# Patient Record
Sex: FEMALE | Race: WHITE | Employment: OTHER | URBAN - METROPOLITAN AREA
[De-identification: names, ages, dates, MRNs, and addresses within clinical notes are randomized per-mention and may not be internally consistent; named-entity substitution may affect disease eponyms.]

---

## 2017-12-26 ENCOUNTER — HOSPITAL ENCOUNTER (OUTPATIENT)
Dept: MAMMOGRAPHY | Age: 47
Discharge: HOME | End: 2017-12-26
Attending: FAMILY MEDICINE
Payer: MEDICARE

## 2017-12-26 DIAGNOSIS — Z12.31 VISIT FOR SCREENING MAMMOGRAM: ICD-10-CM

## 2017-12-26 PROCEDURE — 77067 SCR MAMMO BI INCL CAD: CPT

## 2018-11-28 ENCOUNTER — HOSPITAL ENCOUNTER (OUTPATIENT)
Dept: GENERAL RADIOLOGY | Age: 48
Discharge: HOME | End: 2018-11-28
Payer: MEDICARE

## 2018-11-28 DIAGNOSIS — M54.2 CERVICAL SPINE PAIN: ICD-10-CM

## 2018-11-28 PROCEDURE — 72050 X-RAY EXAM NECK SPINE 4/5VWS: CPT

## 2019-01-23 ENCOUNTER — HOSPITAL ENCOUNTER (OUTPATIENT)
Dept: MAMMOGRAPHY | Age: 49
Discharge: HOME | End: 2019-01-23
Attending: FAMILY MEDICINE
Payer: MEDICARE

## 2019-01-23 DIAGNOSIS — Z12.31 VISIT FOR SCREENING MAMMOGRAM: ICD-10-CM

## 2019-01-23 PROCEDURE — 77067 SCR MAMMO BI INCL CAD: CPT

## 2019-10-01 ENCOUNTER — HOSPITAL ENCOUNTER (OUTPATIENT)
Dept: CT IMAGING | Age: 49
Discharge: HOME | End: 2019-10-01
Attending: NURSE PRACTITIONER
Payer: MEDICARE

## 2019-10-01 DIAGNOSIS — R10.9 BILATERAL FLANK PAIN: ICD-10-CM

## 2019-10-01 DIAGNOSIS — Z87.440 HISTORY OF RECURRENT UTIS: ICD-10-CM

## 2019-10-01 DIAGNOSIS — Z87.442 HISTORY OF KIDNEY STONES: ICD-10-CM

## 2019-10-01 PROCEDURE — 74176 CT ABD & PELVIS W/O CONTRAST: CPT

## 2019-10-02 NOTE — PROGRESS NOTES
I called and spoke with Ms. Darrel Haines about her CT results. She will be contacting [facility] about follow-up on the right ovarian mass.

## 2020-01-01 ENCOUNTER — APPOINTMENT (OUTPATIENT)
Dept: GENERAL RADIOLOGY | Age: 50
DRG: 207 | End: 2020-01-01
Attending: INTERNAL MEDICINE
Payer: MEDICARE

## 2020-01-01 ENCOUNTER — HOSPITAL ENCOUNTER (OUTPATIENT)
Dept: MAMMOGRAPHY | Age: 50
Discharge: HOME | End: 2020-02-03
Attending: FAMILY MEDICINE

## 2020-01-01 ENCOUNTER — HOSPITAL ENCOUNTER (INPATIENT)
Age: 50
LOS: 11 days | DRG: 207 | End: 2020-11-10
Attending: EMERGENCY MEDICINE | Admitting: FAMILY MEDICINE
Payer: MEDICARE

## 2020-01-01 ENCOUNTER — APPOINTMENT (OUTPATIENT)
Dept: GENERAL RADIOLOGY | Age: 50
DRG: 207 | End: 2020-01-01
Attending: EMERGENCY MEDICINE
Payer: MEDICARE

## 2020-01-01 ENCOUNTER — HOSPITAL ENCOUNTER (OUTPATIENT)
Dept: GENERAL RADIOLOGY | Age: 50
Discharge: HOME | End: 2020-01-02
Payer: MEDICARE

## 2020-01-01 VITALS
OXYGEN SATURATION: 89 % | BODY MASS INDEX: 46.13 KG/M2 | DIASTOLIC BLOOD PRESSURE: 33 MMHG | RESPIRATION RATE: 35 BRPM | SYSTOLIC BLOOD PRESSURE: 60 MMHG | TEMPERATURE: 98.2 F | HEIGHT: 62 IN | WEIGHT: 250.66 LBS

## 2020-01-01 DIAGNOSIS — I10 ESSENTIAL HYPERTENSION: ICD-10-CM

## 2020-01-01 DIAGNOSIS — J12.82 PNEUMONIA DUE TO COVID-19 VIRUS: ICD-10-CM

## 2020-01-01 DIAGNOSIS — Z12.31 OTHER SCREENING MAMMOGRAM: ICD-10-CM

## 2020-01-01 DIAGNOSIS — U07.1 COVID-19: ICD-10-CM

## 2020-01-01 DIAGNOSIS — M13.0 POLYARTHRITIS: ICD-10-CM

## 2020-01-01 DIAGNOSIS — J96.01 ACUTE HYPOXEMIC RESPIRATORY FAILURE DUE TO COVID-19 (HCC): ICD-10-CM

## 2020-01-01 DIAGNOSIS — J80 ACUTE RESPIRATORY DISTRESS SYNDROME (ARDS) DUE TO COVID-19 VIRUS (HCC): ICD-10-CM

## 2020-01-01 DIAGNOSIS — U07.1 ACUTE RESPIRATORY DISTRESS SYNDROME (ARDS) DUE TO COVID-19 VIRUS (HCC): ICD-10-CM

## 2020-01-01 DIAGNOSIS — G47.33 OSA (OBSTRUCTIVE SLEEP APNEA): Chronic | ICD-10-CM

## 2020-01-01 DIAGNOSIS — R09.02 HYPOXIA: ICD-10-CM

## 2020-01-01 DIAGNOSIS — M25.69 BACK STIFFNESS: ICD-10-CM

## 2020-01-01 DIAGNOSIS — E66.01 OBESITY, MORBID (MORE THAN 100 LBS OVER IDEAL WEIGHT OR BMI > 40) (HCC): ICD-10-CM

## 2020-01-01 DIAGNOSIS — R00.0 TACHYCARDIA: ICD-10-CM

## 2020-01-01 DIAGNOSIS — U07.1 PNEUMONIA DUE TO COVID-19 VIRUS: ICD-10-CM

## 2020-01-01 DIAGNOSIS — E66.01 MORBID OBESITY (HCC): ICD-10-CM

## 2020-01-01 DIAGNOSIS — E87.5 HYPERKALEMIA: ICD-10-CM

## 2020-01-01 DIAGNOSIS — U07.1 COVID-19 VIRUS INFECTION: Primary | ICD-10-CM

## 2020-01-01 DIAGNOSIS — U07.1 ACUTE HYPOXEMIC RESPIRATORY FAILURE DUE TO COVID-19 (HCC): ICD-10-CM

## 2020-01-01 LAB
ABO + RH BLD: NORMAL
ALBUMIN SERPL-MCNC: 2.7 G/DL (ref 3.5–5)
ALBUMIN/GLOB SERPL: 0.6 {RATIO} (ref 1.2–3.5)
ALP SERPL-CCNC: 113 U/L (ref 50–136)
ALT SERPL-CCNC: 25 U/L (ref 12–65)
ANION GAP SERPL CALC-SCNC: 0 MMOL/L (ref 7–16)
ANION GAP SERPL CALC-SCNC: 1 MMOL/L (ref 7–16)
ANION GAP SERPL CALC-SCNC: 2 MMOL/L (ref 7–16)
ANION GAP SERPL CALC-SCNC: 2 MMOL/L (ref 7–16)
ANION GAP SERPL CALC-SCNC: 4 MMOL/L (ref 7–16)
ANION GAP SERPL CALC-SCNC: 5 MMOL/L (ref 7–16)
ANION GAP SERPL CALC-SCNC: 5 MMOL/L (ref 7–16)
ANION GAP SERPL CALC-SCNC: 7 MMOL/L (ref 7–16)
ANION GAP SERPL CALC-SCNC: 7 MMOL/L (ref 7–16)
ANION GAP SERPL CALC-SCNC: ABNORMAL MMOL/L (ref 7–16)
APPEARANCE UR: ABNORMAL
APPEARANCE UR: CLEAR
APTT PPP: 33.4 SEC (ref 24.7–39.8)
ARTERIAL PATENCY WRIST A: ABNORMAL
ARTERIAL PATENCY WRIST A: YES
AST SERPL-CCNC: 52 U/L (ref 15–37)
ATRIAL RATE: 126 BPM
BACTERIA SPEC CULT: NORMAL
BACTERIA URNS QL MICRO: ABNORMAL /HPF
BASE DEFICIT BLD-SCNC: 2 MMOL/L
BASE EXCESS BLD CALC-SCNC: 0 MMOL/L
BASE EXCESS BLD CALC-SCNC: 0 MMOL/L
BASE EXCESS BLD CALC-SCNC: 1 MMOL/L
BASE EXCESS BLD CALC-SCNC: 13 MMOL/L
BASE EXCESS BLD CALC-SCNC: 14 MMOL/L
BASE EXCESS BLD CALC-SCNC: 17 MMOL/L
BASE EXCESS BLD CALC-SCNC: 2 MMOL/L
BASE EXCESS BLD CALC-SCNC: 3 MMOL/L
BASE EXCESS BLD CALC-SCNC: 4 MMOL/L
BASE EXCESS BLD CALC-SCNC: 4 MMOL/L
BASE EXCESS BLD CALC-SCNC: 6 MMOL/L
BASE EXCESS BLD CALC-SCNC: 7 MMOL/L
BASE EXCESS BLD CALC-SCNC: 7 MMOL/L
BASE EXCESS BLD CALC-SCNC: 9 MMOL/L
BASOPHILS # BLD: 0 K/UL (ref 0–0.2)
BASOPHILS NFR BLD: 0 % (ref 0–2)
BDY SITE: ABNORMAL
BDY SITE: NORMAL
BILIRUB SERPL-MCNC: 0.7 MG/DL (ref 0.2–1.1)
BILIRUB UR QL: NEGATIVE
BILIRUB UR QL: NEGATIVE
BLD PROD TYP BPU: NORMAL
BPU ID: NORMAL
BUN SERPL-MCNC: 12 MG/DL (ref 6–23)
BUN SERPL-MCNC: 14 MG/DL (ref 6–23)
BUN SERPL-MCNC: 17 MG/DL (ref 6–23)
BUN SERPL-MCNC: 19 MG/DL (ref 6–23)
BUN SERPL-MCNC: 20 MG/DL (ref 6–23)
BUN SERPL-MCNC: 23 MG/DL (ref 6–23)
BUN SERPL-MCNC: 25 MG/DL (ref 6–23)
BUN SERPL-MCNC: 34 MG/DL (ref 6–23)
BUN SERPL-MCNC: 35 MG/DL (ref 6–23)
BUN SERPL-MCNC: 36 MG/DL (ref 6–23)
BUN SERPL-MCNC: 38 MG/DL (ref 6–23)
BUN SERPL-MCNC: 44 MG/DL (ref 6–23)
BUN SERPL-MCNC: 7 MG/DL (ref 6–23)
BUN SERPL-MCNC: 7 MG/DL (ref 6–23)
BUN SERPL-MCNC: 8 MG/DL (ref 6–23)
CALCIUM SERPL-MCNC: 7.3 MG/DL (ref 8.3–10.4)
CALCIUM SERPL-MCNC: 7.8 MG/DL (ref 8.3–10.4)
CALCIUM SERPL-MCNC: 8 MG/DL (ref 8.3–10.4)
CALCIUM SERPL-MCNC: 8 MG/DL (ref 8.3–10.4)
CALCIUM SERPL-MCNC: 8.1 MG/DL (ref 8.3–10.4)
CALCIUM SERPL-MCNC: 8.1 MG/DL (ref 8.3–10.4)
CALCIUM SERPL-MCNC: 8.2 MG/DL (ref 8.3–10.4)
CALCIUM SERPL-MCNC: 8.3 MG/DL (ref 8.3–10.4)
CALCIUM SERPL-MCNC: 8.3 MG/DL (ref 8.3–10.4)
CALCIUM SERPL-MCNC: 8.4 MG/DL (ref 8.3–10.4)
CALCIUM SERPL-MCNC: 8.6 MG/DL (ref 8.3–10.4)
CALCIUM SERPL-MCNC: 8.7 MG/DL (ref 8.3–10.4)
CALCIUM SERPL-MCNC: 8.8 MG/DL (ref 8.3–10.4)
CALCULATED P AXIS, ECG09: 47 DEGREES
CALCULATED R AXIS, ECG10: 55 DEGREES
CALCULATED T AXIS, ECG11: 41 DEGREES
CHLORIDE SERPL-SCNC: 100 MMOL/L (ref 98–107)
CHLORIDE SERPL-SCNC: 101 MMOL/L (ref 98–107)
CHLORIDE SERPL-SCNC: 104 MMOL/L (ref 98–107)
CHLORIDE SERPL-SCNC: 104 MMOL/L (ref 98–107)
CHLORIDE SERPL-SCNC: 107 MMOL/L (ref 98–107)
CHLORIDE SERPL-SCNC: 109 MMOL/L (ref 98–107)
CHLORIDE SERPL-SCNC: 110 MMOL/L (ref 98–107)
CHLORIDE SERPL-SCNC: 110 MMOL/L (ref 98–107)
CHLORIDE SERPL-SCNC: 111 MMOL/L (ref 98–107)
CHLORIDE SERPL-SCNC: 113 MMOL/L (ref 98–107)
CHLORIDE SERPL-SCNC: 98 MMOL/L (ref 98–107)
CK SERPL-CCNC: 236 U/L (ref 21–215)
CK SERPL-CCNC: 300 U/L (ref 21–215)
CO2 BLD-SCNC: 25 MMOL/L
CO2 BLD-SCNC: 25 MMOL/L
CO2 BLD-SCNC: 27 MMOL/L
CO2 BLD-SCNC: 30 MMOL/L
CO2 BLD-SCNC: 34 MMOL/L
CO2 BLD-SCNC: 34 MMOL/L
CO2 BLD-SCNC: 35 MMOL/L
CO2 BLD-SCNC: 39 MMOL/L
CO2 BLD-SCNC: 39 MMOL/L
CO2 BLD-SCNC: 40 MMOL/L
CO2 BLD-SCNC: 40 MMOL/L
CO2 BLD-SCNC: 43 MMOL/L
CO2 BLD-SCNC: 44 MMOL/L
CO2 BLD-SCNC: 45 MMOL/L
CO2 BLD-SCNC: 48 MMOL/L
CO2 BLD-SCNC: ABNORMAL MMOL/L
CO2 SERPL-SCNC: 28 MMOL/L (ref 21–32)
CO2 SERPL-SCNC: 29 MMOL/L (ref 21–32)
CO2 SERPL-SCNC: 29 MMOL/L (ref 21–32)
CO2 SERPL-SCNC: 30 MMOL/L (ref 21–32)
CO2 SERPL-SCNC: 30 MMOL/L (ref 21–32)
CO2 SERPL-SCNC: 31 MMOL/L (ref 21–32)
CO2 SERPL-SCNC: 32 MMOL/L (ref 21–32)
CO2 SERPL-SCNC: 34 MMOL/L (ref 21–32)
CO2 SERPL-SCNC: 35 MMOL/L (ref 21–32)
CO2 SERPL-SCNC: 38 MMOL/L (ref 21–32)
CO2 SERPL-SCNC: 38 MMOL/L (ref 21–32)
CO2 SERPL-SCNC: 42 MMOL/L (ref 21–32)
CO2 SERPL-SCNC: 43 MMOL/L (ref 21–32)
COLLECT TIME,HTIME: 1040
COLLECT TIME,HTIME: 1301
COLLECT TIME,HTIME: 1311
COLLECT TIME,HTIME: 1502
COLLECT TIME,HTIME: 1530
COLLECT TIME,HTIME: 1635
COLLECT TIME,HTIME: 1640
COLLECT TIME,HTIME: 1825
COLLECT TIME,HTIME: 1828
COLLECT TIME,HTIME: 2018
COLLECT TIME,HTIME: 2130
COLLECT TIME,HTIME: 230
COLLECT TIME,HTIME: 242
COLLECT TIME,HTIME: 305
COLLECT TIME,HTIME: 315
COLLECT TIME,HTIME: 341
COLLECT TIME,HTIME: 352
COLLECT TIME,HTIME: 405
COLLECT TIME,HTIME: 503
COLLECT TIME,HTIME: 603
COLLECT TIME,HTIME: 852
COLOR UR: ABNORMAL
COLOR UR: YELLOW
COVID-19 RAPID TEST, COVR: DETECTED
CREAT SERPL-MCNC: 0.52 MG/DL (ref 0.6–1)
CREAT SERPL-MCNC: 0.53 MG/DL (ref 0.6–1)
CREAT SERPL-MCNC: 0.54 MG/DL (ref 0.6–1)
CREAT SERPL-MCNC: 0.55 MG/DL (ref 0.6–1)
CREAT SERPL-MCNC: 0.58 MG/DL (ref 0.6–1)
CREAT SERPL-MCNC: 0.59 MG/DL (ref 0.6–1)
CREAT SERPL-MCNC: 0.62 MG/DL (ref 0.6–1)
CREAT SERPL-MCNC: 0.63 MG/DL (ref 0.6–1)
CREAT SERPL-MCNC: 0.64 MG/DL (ref 0.6–1)
CREAT SERPL-MCNC: 0.7 MG/DL (ref 0.6–1)
CREAT SERPL-MCNC: 0.79 MG/DL (ref 0.6–1)
CREAT SERPL-MCNC: 0.8 MG/DL (ref 0.6–1)
CREAT SERPL-MCNC: 1.03 MG/DL (ref 0.6–1)
CREAT SERPL-MCNC: 1.43 MG/DL (ref 0.6–1)
CREAT SERPL-MCNC: 2.04 MG/DL (ref 0.6–1)
CRP SERPL-MCNC: 11.7 MG/DL (ref 0–0.9)
CRP SERPL-MCNC: 9.2 MG/DL (ref 0–0.9)
DIAGNOSIS, 93000: NORMAL
DIFFERENTIAL METHOD BLD: ABNORMAL
EOSINOPHIL # BLD: 0 K/UL (ref 0–0.8)
EOSINOPHIL NFR BLD MANUAL: 1 % (ref 1–8)
EOSINOPHIL NFR BLD: 0 % (ref 0.5–7.8)
EPI CELLS #/AREA URNS HPF: ABNORMAL /HPF
ERYTHROCYTE [DISTWIDTH] IN BLOOD BY AUTOMATED COUNT: 13.9 % (ref 11.9–14.6)
ERYTHROCYTE [DISTWIDTH] IN BLOOD BY AUTOMATED COUNT: 14 % (ref 11.9–14.6)
ERYTHROCYTE [DISTWIDTH] IN BLOOD BY AUTOMATED COUNT: 14.4 % (ref 11.9–14.6)
ERYTHROCYTE [DISTWIDTH] IN BLOOD BY AUTOMATED COUNT: 14.5 % (ref 11.9–14.6)
ERYTHROCYTE [DISTWIDTH] IN BLOOD BY AUTOMATED COUNT: 14.5 % (ref 11.9–14.6)
ERYTHROCYTE [DISTWIDTH] IN BLOOD BY AUTOMATED COUNT: 14.6 % (ref 11.9–14.6)
ERYTHROCYTE [DISTWIDTH] IN BLOOD BY AUTOMATED COUNT: 14.7 % (ref 11.9–14.6)
ERYTHROCYTE [DISTWIDTH] IN BLOOD BY AUTOMATED COUNT: 14.7 % (ref 11.9–14.6)
ERYTHROCYTE [DISTWIDTH] IN BLOOD BY AUTOMATED COUNT: 14.8 % (ref 11.9–14.6)
EXHALED MINUTE VOLUME, VE: 10.1 L/MIN
EXHALED MINUTE VOLUME, VE: 10.9 L/MIN
EXHALED MINUTE VOLUME, VE: 11.7 L/MIN
EXHALED MINUTE VOLUME, VE: 12.4 L/MIN
EXHALED MINUTE VOLUME, VE: 12.8 L/MIN
EXHALED MINUTE VOLUME, VE: 13.9 L/MIN
EXHALED MINUTE VOLUME, VE: 14 L/MIN
EXHALED MINUTE VOLUME, VE: 14.4 L/MIN
EXHALED MINUTE VOLUME, VE: 15.2 L/MIN
EXHALED MINUTE VOLUME, VE: 15.6 L/MIN
EXHALED MINUTE VOLUME, VE: 15.8 L/MIN
EXHALED MINUTE VOLUME, VE: 15.9 L/MIN
EXHALED MINUTE VOLUME, VE: 16.9 L/MIN
EXHALED MINUTE VOLUME, VE: 17 L/MIN
EXHALED MINUTE VOLUME, VE: 17.3 L/MIN
EXHALED MINUTE VOLUME, VE: 18 L/MIN
EXHALED MINUTE VOLUME, VE: 28.6 L/MIN
EXHALED MINUTE VOLUME, VE: 45 L/MIN
FERRITIN SERPL-MCNC: 348 NG/ML (ref 8–388)
FERRITIN SERPL-MCNC: 359 NG/ML (ref 8–388)
FLOW RATE ISTAT,IFRATE: 8 L/MIN
GAS FLOW.O2 O2 DELIVERY SYS: ABNORMAL L/MIN
GAS FLOW.O2 O2 DELIVERY SYS: NORMAL L/MIN
GAS FLOW.O2 SETTING OXYMISER: 20 BPM
GAS FLOW.O2 SETTING OXYMISER: 32 BPM
GAS FLOW.O2 SETTING OXYMISER: 33 BPM
GAS FLOW.O2 SETTING OXYMISER: 34 BPM
GAS FLOW.O2 SETTING OXYMISER: 35 BPM
GAS FLOW.O2 SETTING OXYMISER: 35 BPM
GAS FLOW.O2 SETTING OXYMISER: 36 BPM
GLOBULIN SER CALC-MCNC: 4.3 G/DL (ref 2.3–3.5)
GLUCOSE BLD STRIP.AUTO-MCNC: 130 MG/DL (ref 65–100)
GLUCOSE BLD STRIP.AUTO-MCNC: 140 MG/DL (ref 65–100)
GLUCOSE BLD STRIP.AUTO-MCNC: 150 MG/DL (ref 65–100)
GLUCOSE BLD STRIP.AUTO-MCNC: 155 MG/DL (ref 65–100)
GLUCOSE BLD STRIP.AUTO-MCNC: 162 MG/DL (ref 65–100)
GLUCOSE BLD STRIP.AUTO-MCNC: 178 MG/DL (ref 65–100)
GLUCOSE BLD STRIP.AUTO-MCNC: 181 MG/DL (ref 65–100)
GLUCOSE BLD STRIP.AUTO-MCNC: 182 MG/DL (ref 65–100)
GLUCOSE BLD STRIP.AUTO-MCNC: 187 MG/DL (ref 65–100)
GLUCOSE BLD STRIP.AUTO-MCNC: 189 MG/DL (ref 65–100)
GLUCOSE BLD STRIP.AUTO-MCNC: 199 MG/DL (ref 65–100)
GLUCOSE BLD STRIP.AUTO-MCNC: 200 MG/DL (ref 65–100)
GLUCOSE BLD STRIP.AUTO-MCNC: 203 MG/DL (ref 65–100)
GLUCOSE BLD STRIP.AUTO-MCNC: 204 MG/DL (ref 65–100)
GLUCOSE BLD STRIP.AUTO-MCNC: 210 MG/DL (ref 65–100)
GLUCOSE BLD STRIP.AUTO-MCNC: 211 MG/DL (ref 65–100)
GLUCOSE BLD STRIP.AUTO-MCNC: 212 MG/DL (ref 65–100)
GLUCOSE BLD STRIP.AUTO-MCNC: 215 MG/DL (ref 65–100)
GLUCOSE BLD STRIP.AUTO-MCNC: 218 MG/DL (ref 65–100)
GLUCOSE BLD STRIP.AUTO-MCNC: 221 MG/DL (ref 65–100)
GLUCOSE BLD STRIP.AUTO-MCNC: 232 MG/DL (ref 65–100)
GLUCOSE BLD STRIP.AUTO-MCNC: 251 MG/DL (ref 65–100)
GLUCOSE SERPL-MCNC: 104 MG/DL (ref 65–100)
GLUCOSE SERPL-MCNC: 114 MG/DL (ref 65–100)
GLUCOSE SERPL-MCNC: 140 MG/DL (ref 65–100)
GLUCOSE SERPL-MCNC: 143 MG/DL (ref 65–100)
GLUCOSE SERPL-MCNC: 144 MG/DL (ref 65–100)
GLUCOSE SERPL-MCNC: 156 MG/DL (ref 65–100)
GLUCOSE SERPL-MCNC: 173 MG/DL (ref 65–100)
GLUCOSE SERPL-MCNC: 183 MG/DL (ref 65–100)
GLUCOSE SERPL-MCNC: 184 MG/DL (ref 65–100)
GLUCOSE SERPL-MCNC: 189 MG/DL (ref 65–100)
GLUCOSE SERPL-MCNC: 191 MG/DL (ref 65–100)
GLUCOSE SERPL-MCNC: 224 MG/DL (ref 65–100)
GLUCOSE SERPL-MCNC: 228 MG/DL (ref 65–100)
GLUCOSE SERPL-MCNC: 247 MG/DL (ref 65–100)
GLUCOSE SERPL-MCNC: 85 MG/DL (ref 65–100)
GLUCOSE UR STRIP.AUTO-MCNC: NEGATIVE MG/DL
GLUCOSE UR STRIP.AUTO-MCNC: NEGATIVE MG/DL
HBV SURFACE AG SER QL: NONREACTIVE
HCO3 BLD-SCNC: 24 MMOL/L (ref 22–26)
HCO3 BLD-SCNC: 24.2 MMOL/L (ref 22–26)
HCO3 BLD-SCNC: 25.9 MMOL/L (ref 22–26)
HCO3 BLD-SCNC: 28.5 MMOL/L (ref 22–26)
HCO3 BLD-SCNC: 31.3 MMOL/L (ref 22–26)
HCO3 BLD-SCNC: 31.3 MMOL/L (ref 22–26)
HCO3 BLD-SCNC: 32.9 MMOL/L (ref 22–26)
HCO3 BLD-SCNC: 36.4 MMOL/L (ref 22–26)
HCO3 BLD-SCNC: 36.6 MMOL/L (ref 22–26)
HCO3 BLD-SCNC: 37 MMOL/L (ref 22–26)
HCO3 BLD-SCNC: 37.1 MMOL/L (ref 22–26)
HCO3 BLD-SCNC: 39.2 MMOL/L (ref 22–26)
HCO3 BLD-SCNC: 41.9 MMOL/L (ref 22–26)
HCO3 BLD-SCNC: 42.5 MMOL/L (ref 22–26)
HCO3 BLD-SCNC: 45.5 MMOL/L (ref 22–26)
HCO3 BLD-SCNC: ABNORMAL MMOL/L (ref 22–26)
HCO3 BLDV-SCNC: 28.2 MMOL/L (ref 23–28)
HCT VFR BLD AUTO: 35.5 % (ref 35.8–46.3)
HCT VFR BLD AUTO: 35.8 % (ref 35.8–46.3)
HCT VFR BLD AUTO: 36.1 % (ref 35.8–46.3)
HCT VFR BLD AUTO: 36.1 % (ref 35.8–46.3)
HCT VFR BLD AUTO: 37 % (ref 35.8–46.3)
HCT VFR BLD AUTO: 37.3 % (ref 35.8–46.3)
HCT VFR BLD AUTO: 38.6 % (ref 35.8–46.3)
HCT VFR BLD AUTO: 39.2 % (ref 35.8–46.3)
HCT VFR BLD AUTO: 39.4 % (ref 35.8–46.3)
HEPARIN INDUCED PLT,XHIPA: NEGATIVE
HGB BLD-MCNC: 10.6 G/DL (ref 11.7–15.4)
HGB BLD-MCNC: 11 G/DL (ref 11.7–15.4)
HGB BLD-MCNC: 11 G/DL (ref 11.7–15.4)
HGB BLD-MCNC: 11.4 G/DL (ref 11.7–15.4)
HGB BLD-MCNC: 11.8 G/DL (ref 11.7–15.4)
HGB BLD-MCNC: 11.9 G/DL (ref 11.7–15.4)
HGB BLD-MCNC: 11.9 G/DL (ref 11.7–15.4)
HGB BLD-MCNC: 12.2 G/DL (ref 11.7–15.4)
HGB BLD-MCNC: 12.2 G/DL (ref 11.7–15.4)
HGB BLD-MCNC: 12.5 G/DL (ref 11.7–15.4)
HGB BLD-MCNC: 13.4 G/DL (ref 11.7–15.4)
HGB UR QL STRIP: ABNORMAL
HGB UR QL STRIP: NEGATIVE
HIT INTERPRETATION,XINTPR: NEGATIVE
HIT PROFILE,XHITT: NORMAL
IL6 SERPL-MCNC: 216.3 PG/ML (ref 0–13)
IMM GRANULOCYTES # BLD AUTO: 0 K/UL (ref 0–0.5)
IMM GRANULOCYTES # BLD AUTO: 0 K/UL (ref 0–0.5)
IMM GRANULOCYTES # BLD AUTO: 0.1 K/UL (ref 0–0.5)
IMM GRANULOCYTES # BLD AUTO: 0.3 K/UL (ref 0–0.5)
IMM GRANULOCYTES NFR BLD AUTO: 0 % (ref 0–5)
IMM GRANULOCYTES NFR BLD AUTO: 0 % (ref 0–5)
IMM GRANULOCYTES NFR BLD AUTO: 1 % (ref 0–5)
IMM GRANULOCYTES NFR BLD AUTO: 2 % (ref 0–5)
INSPIRATION.DURATION SETTING TIME VENT: 0.66 SEC
INSPIRATION.DURATION SETTING TIME VENT: 0.7 SEC
INSPIRATION.DURATION SETTING TIME VENT: 0.7 SEC
INSPIRATION.DURATION SETTING TIME VENT: 0.74 SEC
INSPIRATION.DURATION SETTING TIME VENT: 0.74 SEC
INSPIRATION.DURATION SETTING TIME VENT: 0.8 SEC
INSPIRATION.DURATION SETTING TIME VENT: 0.9 SEC
INSPIRATION.DURATION SETTING TIME VENT: 0.9 SEC
KETONES UR QL STRIP.AUTO: 15 MG/DL
KETONES UR QL STRIP.AUTO: NEGATIVE MG/DL
LACTATE SERPL-SCNC: 1.5 MMOL/L (ref 0.4–2)
LACTATE SERPL-SCNC: 1.7 MMOL/L (ref 0.4–2)
LDH SERPL L TO P-CCNC: 1163 U/L (ref 100–190)
LEUKOCYTE ESTERASE UR QL STRIP.AUTO: NEGATIVE
LEUKOCYTE ESTERASE UR QL STRIP.AUTO: NEGATIVE
LYMPHOCYTES # BLD: 1 K/UL (ref 0.5–4.6)
LYMPHOCYTES # BLD: 1 K/UL (ref 0.5–4.6)
LYMPHOCYTES # BLD: 1.3 K/UL (ref 0.5–4.6)
LYMPHOCYTES # BLD: 1.5 K/UL (ref 0.5–4.6)
LYMPHOCYTES NFR BLD MANUAL: 12 % (ref 16–44)
LYMPHOCYTES NFR BLD: 16 % (ref 13–44)
LYMPHOCYTES NFR BLD: 27 % (ref 13–44)
LYMPHOCYTES NFR BLD: 6 % (ref 13–44)
LYMPHOCYTES NFR BLD: 9 % (ref 13–44)
MAGNESIUM SERPL-MCNC: 2.4 MG/DL (ref 1.8–2.4)
MAGNESIUM SERPL-MCNC: 2.5 MG/DL (ref 1.8–2.4)
MAGNESIUM SERPL-MCNC: 2.6 MG/DL (ref 1.8–2.4)
MAGNESIUM SERPL-MCNC: 2.9 MG/DL (ref 1.8–2.4)
MAGNESIUM SERPL-MCNC: 3 MG/DL (ref 1.8–2.4)
MAGNESIUM SERPL-MCNC: 3 MG/DL (ref 1.8–2.4)
MCH RBC QN AUTO: 29.8 PG (ref 26.1–32.9)
MCH RBC QN AUTO: 30.1 PG (ref 26.1–32.9)
MCH RBC QN AUTO: 30.1 PG (ref 26.1–32.9)
MCH RBC QN AUTO: 30.2 PG (ref 26.1–32.9)
MCH RBC QN AUTO: 30.3 PG (ref 26.1–32.9)
MCH RBC QN AUTO: 30.4 PG (ref 26.1–32.9)
MCH RBC QN AUTO: 30.6 PG (ref 26.1–32.9)
MCH RBC QN AUTO: 30.7 PG (ref 26.1–32.9)
MCH RBC QN AUTO: 30.9 PG (ref 26.1–32.9)
MCH RBC QN AUTO: 30.9 PG (ref 26.1–32.9)
MCH RBC QN AUTO: 31 PG (ref 26.1–32.9)
MCHC RBC AUTO-ENTMCNC: 29.9 G/DL (ref 31.4–35)
MCHC RBC AUTO-ENTMCNC: 30.4 G/DL (ref 31.4–35)
MCHC RBC AUTO-ENTMCNC: 30.5 G/DL (ref 31.4–35)
MCHC RBC AUTO-ENTMCNC: 30.5 G/DL (ref 31.4–35)
MCHC RBC AUTO-ENTMCNC: 30.6 G/DL (ref 31.4–35)
MCHC RBC AUTO-ENTMCNC: 30.6 G/DL (ref 31.4–35)
MCHC RBC AUTO-ENTMCNC: 31.9 G/DL (ref 31.4–35)
MCHC RBC AUTO-ENTMCNC: 33 G/DL (ref 31.4–35)
MCHC RBC AUTO-ENTMCNC: 33.5 G/DL (ref 31.4–35)
MCHC RBC AUTO-ENTMCNC: 34 G/DL (ref 31.4–35)
MCHC RBC AUTO-ENTMCNC: 34.1 G/DL (ref 31.4–35)
MCV RBC AUTO: 100.6 FL (ref 79.6–97.8)
MCV RBC AUTO: 103.5 FL (ref 79.6–97.8)
MCV RBC AUTO: 89.9 FL (ref 79.6–97.8)
MCV RBC AUTO: 90.2 FL (ref 79.6–97.8)
MCV RBC AUTO: 90.6 FL (ref 79.6–97.8)
MCV RBC AUTO: 94.1 FL (ref 79.6–97.8)
MCV RBC AUTO: 95.4 FL (ref 79.6–97.8)
MCV RBC AUTO: 98.2 FL (ref 79.6–97.8)
MCV RBC AUTO: 98.7 FL (ref 79.6–97.8)
MCV RBC AUTO: 98.9 FL (ref 79.6–97.8)
MCV RBC AUTO: 99 FL (ref 79.6–97.8)
MONOCYTES # BLD: 0.6 K/UL (ref 0.1–1.3)
MONOCYTES # BLD: 0.6 K/UL (ref 0.1–1.3)
MONOCYTES # BLD: 0.8 K/UL (ref 0.1–1.3)
MONOCYTES # BLD: 0.9 K/UL (ref 0.1–1.3)
MONOCYTES NFR BLD MANUAL: 3 % (ref 3–9)
MONOCYTES NFR BLD: 10 % (ref 4–12)
MONOCYTES NFR BLD: 12 % (ref 4–12)
MONOCYTES NFR BLD: 4 % (ref 4–12)
MONOCYTES NFR BLD: 6 % (ref 4–12)
NEUTS BAND NFR BLD MANUAL: 16 % (ref 0–10)
NEUTS SEG # BLD: 13.3 K/UL (ref 1.7–8.2)
NEUTS SEG # BLD: 16.3 K/UL (ref 1.7–8.2)
NEUTS SEG # BLD: 2.9 K/UL (ref 1.7–8.2)
NEUTS SEG # BLD: 4.4 K/UL (ref 1.7–8.2)
NEUTS SEG NFR BLD MANUAL: 68 % (ref 47–75)
NEUTS SEG NFR BLD: 60 % (ref 43–78)
NEUTS SEG NFR BLD: 73 % (ref 43–78)
NEUTS SEG NFR BLD: 84 % (ref 43–78)
NEUTS SEG NFR BLD: 89 % (ref 43–78)
NITRITE UR QL STRIP.AUTO: NEGATIVE
NITRITE UR QL STRIP.AUTO: NEGATIVE
NRBC # BLD: 0 K/UL (ref 0–0.2)
NRBC # BLD: 0.04 K/UL (ref 0–0.2)
NRBC # BLD: 0.11 K/UL (ref 0–0.2)
NRBC # BLD: 0.19 K/UL (ref 0–0.2)
NRBC # BLD: 0.3 K/UL (ref 0–0.2)
NRBC # BLD: 0.39 K/UL (ref 0–0.2)
NRBC # BLD: 0.93 K/UL (ref 0–0.2)
O2/TOTAL GAS SETTING VFR VENT: 100 %
O2/TOTAL GAS SETTING VFR VENT: 75 %
O2/TOTAL GAS SETTING VFR VENT: 80 %
O2/TOTAL GAS SETTING VFR VENT: 85 %
O2/TOTAL GAS SETTING VFR VENT: 90 %
O2/TOTAL GAS SETTING VFR VENT: 90 %
OPTICAL DENSITY READ,XHITAO: 0.19 ABS
OTHER OBSERVATIONS,UCOM: ABNORMAL
P-R INTERVAL, ECG05: 128 MS
PCO2 BLD: 111.9 MMHG (ref 35–45)
PCO2 BLD: 128.2 MMHG (ref 35–45)
PCO2 BLD: 31.9 MMHG (ref 35–45)
PCO2 BLD: 36.2 MMHG (ref 35–45)
PCO2 BLD: 42.6 MMHG (ref 35–45)
PCO2 BLD: 65.8 MMHG (ref 35–45)
PCO2 BLD: 70.6 MMHG (ref 35–45)
PCO2 BLD: 71.3 MMHG (ref 35–45)
PCO2 BLD: 72.5 MMHG (ref 35–45)
PCO2 BLD: 77.1 MMHG (ref 35–45)
PCO2 BLD: 78.1 MMHG (ref 35–45)
PCO2 BLD: 78.3 MMHG (ref 35–45)
PCO2 BLD: 81.1 MMHG (ref 35–45)
PCO2 BLD: 83.9 MMHG (ref 35–45)
PCO2 BLD: 89.6 MMHG (ref 35–45)
PCO2 BLD: >130 MMHG (ref 35–45)
PCO2 BLDV: 40.8 MMHG (ref 41–51)
PEEP RESPIRATORY: 12 CMH2O
PEEP RESPIRATORY: 15 CMH2O
PEEP RESPIRATORY: 16 CMH2O
PEEP RESPIRATORY: 18 CMH2O
PEEP RESPIRATORY: 18 CMH2O
PEEP RESPIRATORY: 19 CMH2O
PEEP RESPIRATORY: 19 CMH2O
PH BLD: 6.99 [PH] (ref 7.35–7.45)
PH BLD: 7.04 [PH] (ref 7.35–7.45)
PH BLD: 7.05 [PH] (ref 7.35–7.45)
PH BLD: 7.08 [PH] (ref 7.35–7.45)
PH BLD: 7.09 [PH] (ref 7.35–7.45)
PH BLD: 7.11 [PH] (ref 7.35–7.45)
PH BLD: 7.12 [PH] (ref 7.35–7.45)
PH BLD: 7.21 [PH] (ref 7.35–7.45)
PH BLD: 7.22 [PH] (ref 7.35–7.45)
PH BLD: 7.23 [PH] (ref 7.35–7.45)
PH BLD: 7.24 [PH] (ref 7.35–7.45)
PH BLD: 7.26 [PH] (ref 7.35–7.45)
PH BLD: 7.28 [PH] (ref 7.35–7.45)
PH BLD: 7.31 [PH] (ref 7.35–7.45)
PH BLD: 7.32 [PH] (ref 7.35–7.45)
PH BLD: 7.36 [PH] (ref 7.35–7.45)
PH BLD: 7.37 [PH] (ref 7.35–7.45)
PH BLD: 7.38 [PH] (ref 7.35–7.45)
PH BLD: 7.43 [PH] (ref 7.35–7.45)
PH BLD: 7.52 [PH] (ref 7.35–7.45)
PH BLDV: 7.45 [PH] (ref 7.32–7.42)
PH UR STRIP: 6 [PH] (ref 5–9)
PH UR STRIP: 7 [PH] (ref 5–9)
PHOSPHATE SERPL-MCNC: 2.3 MG/DL (ref 2.5–4.5)
PHOSPHATE SERPL-MCNC: 2.7 MG/DL (ref 2.5–4.5)
PHOSPHATE SERPL-MCNC: 3.9 MG/DL (ref 2.5–4.5)
PHOSPHATE SERPL-MCNC: 4 MG/DL (ref 2.5–4.5)
PHOSPHATE SERPL-MCNC: 4.8 MG/DL (ref 2.5–4.5)
PHOSPHATE SERPL-MCNC: 5 MG/DL (ref 2.5–4.5)
PLATELET # BLD AUTO: 103 K/UL (ref 150–450)
PLATELET # BLD AUTO: 104 K/UL (ref 150–450)
PLATELET # BLD AUTO: 121 K/UL (ref 150–450)
PLATELET # BLD AUTO: 125 K/UL (ref 150–450)
PLATELET # BLD AUTO: 136 K/UL (ref 150–450)
PLATELET # BLD AUTO: 159 K/UL (ref 150–450)
PLATELET # BLD AUTO: 216 K/UL (ref 150–450)
PLATELET # BLD AUTO: 239 K/UL (ref 150–450)
PLATELET # BLD AUTO: 247 K/UL (ref 150–450)
PLATELET # BLD AUTO: 78 K/UL (ref 150–450)
PLATELET # BLD AUTO: 99 K/UL (ref 150–450)
PLATELET COMMENTS,PCOM: ADEQUATE
PMV BLD AUTO: 10.5 FL (ref 9.4–12.3)
PMV BLD AUTO: 10.9 FL (ref 9.4–12.3)
PMV BLD AUTO: 11.1 FL (ref 9.4–12.3)
PMV BLD AUTO: 11.4 FL (ref 9.4–12.3)
PMV BLD AUTO: 11.5 FL (ref 9.4–12.3)
PMV BLD AUTO: 11.9 FL (ref 9.4–12.3)
PMV BLD AUTO: 12.5 FL (ref 9.4–12.3)
PMV BLD AUTO: 8.9 FL (ref 9.4–12.3)
PMV BLD AUTO: 9.2 FL (ref 9.4–12.3)
PMV BLD AUTO: 9.4 FL (ref 9.4–12.3)
PMV BLD AUTO: 9.4 FL (ref 9.4–12.3)
PO2 BLD: 113 MMHG (ref 75–100)
PO2 BLD: 116 MMHG (ref 75–100)
PO2 BLD: 138 MMHG (ref 75–100)
PO2 BLD: 55 MMHG (ref 75–100)
PO2 BLD: 63 MMHG (ref 75–100)
PO2 BLD: 69 MMHG (ref 75–100)
PO2 BLD: 71 MMHG (ref 75–100)
PO2 BLD: 74 MMHG (ref 75–100)
PO2 BLD: 76 MMHG (ref 75–100)
PO2 BLD: 76 MMHG (ref 75–100)
PO2 BLD: 77 MMHG (ref 75–100)
PO2 BLD: 78 MMHG (ref 75–100)
PO2 BLD: 79 MMHG (ref 75–100)
PO2 BLD: 80 MMHG (ref 75–100)
PO2 BLD: 80 MMHG (ref 75–100)
PO2 BLD: 82 MMHG (ref 75–100)
PO2 BLD: 86 MMHG (ref 75–100)
PO2 BLD: 89 MMHG (ref 75–100)
PO2 BLD: 92 MMHG (ref 75–100)
PO2 BLD: 99 MMHG (ref 75–100)
PO2 BLDV: 28 MMHG
POTASSIUM SERPL-SCNC: 2.9 MMOL/L (ref 3.5–5.1)
POTASSIUM SERPL-SCNC: 3 MMOL/L (ref 3.5–5.1)
POTASSIUM SERPL-SCNC: 3.7 MMOL/L (ref 3.5–5.1)
POTASSIUM SERPL-SCNC: 4 MMOL/L (ref 3.5–5.1)
POTASSIUM SERPL-SCNC: 4.2 MMOL/L (ref 3.5–5.1)
POTASSIUM SERPL-SCNC: 4.9 MMOL/L (ref 3.5–5.1)
POTASSIUM SERPL-SCNC: 5 MMOL/L (ref 3.5–5.1)
POTASSIUM SERPL-SCNC: 5.3 MMOL/L (ref 3.5–5.1)
POTASSIUM SERPL-SCNC: 5.4 MMOL/L (ref 3.5–5.1)
POTASSIUM SERPL-SCNC: 5.4 MMOL/L (ref 3.5–5.1)
POTASSIUM SERPL-SCNC: 5.6 MMOL/L (ref 3.5–5.1)
POTASSIUM SERPL-SCNC: 5.6 MMOL/L (ref 3.5–5.1)
POTASSIUM SERPL-SCNC: 5.7 MMOL/L (ref 3.5–5.1)
POTASSIUM SERPL-SCNC: 5.9 MMOL/L (ref 3.5–5.1)
POTASSIUM SERPL-SCNC: 5.9 MMOL/L (ref 3.5–5.1)
POTASSIUM SERPL-SCNC: 6.2 MMOL/L (ref 3.5–5.1)
POTASSIUM SERPL-SCNC: 6.4 MMOL/L (ref 3.5–5.1)
POTASSIUM SERPL-SCNC: 6.7 MMOL/L (ref 3.5–5.1)
POTASSIUM SERPL-SCNC: 6.8 MMOL/L (ref 3.5–5.1)
PRESSURE CONTROL, IPC: 14
PRESSURE CONTROL, IPC: 16
PROT SERPL-MCNC: 7 G/DL (ref 6.3–8.2)
PROT UR STRIP-MCNC: 100 MG/DL
PROT UR STRIP-MCNC: NEGATIVE MG/DL
Q-T INTERVAL, ECG07: 324 MS
QRS DURATION, ECG06: 110 MS
QTC CALCULATION (BEZET), ECG08: 469 MS
RBC # BLD AUTO: 3.43 M/UL (ref 4.05–5.2)
RBC # BLD AUTO: 3.59 M/UL (ref 4.05–5.2)
RBC # BLD AUTO: 3.65 M/UL (ref 4.05–5.2)
RBC # BLD AUTO: 3.78 M/UL (ref 4.05–5.2)
RBC # BLD AUTO: 3.9 M/UL (ref 4.05–5.2)
RBC # BLD AUTO: 3.91 M/UL (ref 4.05–5.2)
RBC # BLD AUTO: 3.93 M/UL (ref 4.05–5.2)
RBC # BLD AUTO: 3.95 M/UL (ref 4.05–5.2)
RBC # BLD AUTO: 3.99 M/UL (ref 4.05–5.2)
RBC # BLD AUTO: 4.15 M/UL (ref 4.05–5.2)
RBC # BLD AUTO: 4.37 M/UL (ref 4.05–5.2)
RBC #/AREA URNS HPF: >100 /HPF
RBC MORPH BLD: ABNORMAL
SAO2 % BLD: 72 % (ref 95–98)
SAO2 % BLD: 86 % (ref 95–98)
SAO2 % BLD: 90 % (ref 95–98)
SAO2 % BLD: 92 % (ref 95–98)
SAO2 % BLD: 92 % (ref 95–98)
SAO2 % BLD: 94 % (ref 95–98)
SAO2 % BLD: 94 % (ref 95–98)
SAO2 % BLD: 95 % (ref 95–98)
SAO2 % BLD: 95 % (ref 95–98)
SAO2 % BLD: 96 % (ref 95–98)
SAO2 % BLD: 96 % (ref 95–98)
SAO2 % BLD: 97 % (ref 95–98)
SAO2 % BLD: 97 % (ref 95–98)
SAO2 % BLD: 98 % (ref 95–98)
SAO2 % BLD: 98 % (ref 95–98)
SAO2 % BLD: ABNORMAL % (ref 95–98)
SAO2 % BLDV: 57 % (ref 65–88)
SARS-COV-2, COV2NT: ABNORMAL
SERVICE CMNT-IMP: ABNORMAL
SERVICE CMNT-IMP: NORMAL
SODIUM SERPL-SCNC: 135 MMOL/L (ref 136–145)
SODIUM SERPL-SCNC: 138 MMOL/L (ref 136–145)
SODIUM SERPL-SCNC: 138 MMOL/L (ref 136–145)
SODIUM SERPL-SCNC: 139 MMOL/L (ref 138–145)
SODIUM SERPL-SCNC: 140 MMOL/L (ref 136–145)
SODIUM SERPL-SCNC: 141 MMOL/L (ref 136–145)
SODIUM SERPL-SCNC: 143 MMOL/L (ref 138–145)
SODIUM SERPL-SCNC: 144 MMOL/L (ref 136–145)
SODIUM SERPL-SCNC: 144 MMOL/L (ref 136–145)
SODIUM SERPL-SCNC: 147 MMOL/L (ref 136–145)
SODIUM SERPL-SCNC: 151 MMOL/L (ref 136–145)
SODIUM SERPL-SCNC: 151 MMOL/L (ref 136–145)
SOURCE, COVRS: ABNORMAL
SP GR UR REFRACTOMETRY: 1.01 (ref 1–1.02)
SP GR UR REFRACTOMETRY: 1.03 (ref 1–1.02)
SPECIMEN TYPE: ABNORMAL
SPECIMEN TYPE: NORMAL
STATUS OF UNIT,%ST: NORMAL
TRIGL SERPL-MCNC: 249 MG/DL (ref 35–150)
UNIT DIVISION, %UDIV: NORMAL
UROBILINOGEN UR QL STRIP.AUTO: 1 EU/DL (ref 0.2–1)
UROBILINOGEN UR QL STRIP.AUTO: 4 EU/DL (ref 0.2–1)
VENTILATION MODE VENT: ABNORMAL
VENTILATION MODE VENT: NORMAL
VENTRICULAR RATE, ECG03: 126 BPM
VT SETTING VENT: 320 ML
VT SETTING VENT: 330 ML
VT SETTING VENT: 400 ML
VT SETTING VENT: 400 ML
VT SETTING VENT: 410 ML
VT SETTING VENT: 450 ML
VT SETTING VENT: 709 ML
WBC # BLD AUTO: 15.8 K/UL (ref 4.3–11.1)
WBC # BLD AUTO: 18.4 K/UL (ref 4.3–11.1)
WBC # BLD AUTO: 19.3 K/UL (ref 4.3–11.1)
WBC # BLD AUTO: 24.9 K/UL (ref 4.3–11.1)
WBC # BLD AUTO: 26.1 K/UL (ref 4.3–11.1)
WBC # BLD AUTO: 28.6 K/UL (ref 4.3–11.1)
WBC # BLD AUTO: 29.8 K/UL (ref 4.3–11.1)
WBC # BLD AUTO: 36 K/UL (ref 4.3–11.1)
WBC # BLD AUTO: 4.8 K/UL (ref 4.3–11.1)
WBC # BLD AUTO: 6.1 K/UL (ref 4.3–11.1)
WBC # BLD AUTO: 60.2 K/UL (ref 4.3–11.1)
WBC URNS QL MICRO: ABNORMAL /HPF

## 2020-01-01 PROCEDURE — 85027 COMPLETE CBC AUTOMATED: CPT

## 2020-01-01 PROCEDURE — 74011000250 HC RX REV CODE- 250: Performed by: INTERNAL MEDICINE

## 2020-01-01 PROCEDURE — 31500 INSERT EMERGENCY AIRWAY: CPT | Performed by: INTERNAL MEDICINE

## 2020-01-01 PROCEDURE — 74011250637 HC RX REV CODE- 250/637: Performed by: INTERNAL MEDICINE

## 2020-01-01 PROCEDURE — 99233 SBSQ HOSP IP/OBS HIGH 50: CPT | Performed by: INTERNAL MEDICINE

## 2020-01-01 PROCEDURE — 77010033678 HC OXYGEN DAILY

## 2020-01-01 PROCEDURE — 87040 BLOOD CULTURE FOR BACTERIA: CPT

## 2020-01-01 PROCEDURE — 94762 N-INVAS EAR/PLS OXIMTRY CONT: CPT

## 2020-01-01 PROCEDURE — 74011250636 HC RX REV CODE- 250/636: Performed by: INTERNAL MEDICINE

## 2020-01-01 PROCEDURE — 82962 GLUCOSE BLOOD TEST: CPT

## 2020-01-01 PROCEDURE — XW13325 TRANSFUSION OF CONVALESCENT PLASMA (NONAUTOLOGOUS) INTO PERIPHERAL VEIN, PERCUTANEOUS APPROACH, NEW TECHNOLOGY GROUP 5: ICD-10-PCS | Performed by: INTERNAL MEDICINE

## 2020-01-01 PROCEDURE — 77010033711 HC HIGH FLOW OXYGEN

## 2020-01-01 PROCEDURE — 74011636637 HC RX REV CODE- 636/637: Performed by: INTERNAL MEDICINE

## 2020-01-01 PROCEDURE — 74011250636 HC RX REV CODE- 250/636: Performed by: FAMILY MEDICINE

## 2020-01-01 PROCEDURE — 36620 INSERTION CATHETER ARTERY: CPT | Performed by: INTERNAL MEDICINE

## 2020-01-01 PROCEDURE — 81001 URINALYSIS AUTO W/SCOPE: CPT

## 2020-01-01 PROCEDURE — 77030005513 HC CATH URETH FOL11 MDII -B

## 2020-01-01 PROCEDURE — 73620 X-RAY EXAM OF FOOT: CPT

## 2020-01-01 PROCEDURE — 94760 N-INVAS EAR/PLS OXIMETRY 1: CPT

## 2020-01-01 PROCEDURE — 65620000000 HC RM CCU GENERAL

## 2020-01-01 PROCEDURE — 74011000258 HC RX REV CODE- 258: Performed by: INTERNAL MEDICINE

## 2020-01-01 PROCEDURE — 86140 C-REACTIVE PROTEIN: CPT

## 2020-01-01 PROCEDURE — 99291 CRITICAL CARE FIRST HOUR: CPT | Performed by: INTERNAL MEDICINE

## 2020-01-01 PROCEDURE — 5A1955Z RESPIRATORY VENTILATION, GREATER THAN 96 CONSECUTIVE HOURS: ICD-10-PCS | Performed by: INTERNAL MEDICINE

## 2020-01-01 PROCEDURE — 87150 DNA/RNA AMPLIFIED PROBE: CPT

## 2020-01-01 PROCEDURE — 2709999900 HC NON-CHARGEABLE SUPPLY

## 2020-01-01 PROCEDURE — 71045 X-RAY EXAM CHEST 1 VIEW: CPT

## 2020-01-01 PROCEDURE — 94660 CPAP INITIATION&MGMT: CPT

## 2020-01-01 PROCEDURE — 36592 COLLECT BLOOD FROM PICC: CPT

## 2020-01-01 PROCEDURE — 86022 PLATELET ANTIBODIES: CPT

## 2020-01-01 PROCEDURE — C9113 INJ PANTOPRAZOLE SODIUM, VIA: HCPCS | Performed by: INTERNAL MEDICINE

## 2020-01-01 PROCEDURE — 36600 WITHDRAWAL OF ARTERIAL BLOOD: CPT

## 2020-01-01 PROCEDURE — 94003 VENT MGMT INPAT SUBQ DAY: CPT

## 2020-01-01 PROCEDURE — 82550 ASSAY OF CK (CPK): CPT

## 2020-01-01 PROCEDURE — 74011250637 HC RX REV CODE- 250/637: Performed by: FAMILY MEDICINE

## 2020-01-01 PROCEDURE — 94640 AIRWAY INHALATION TREATMENT: CPT

## 2020-01-01 PROCEDURE — 83735 ASSAY OF MAGNESIUM: CPT

## 2020-01-01 PROCEDURE — 80048 BASIC METABOLIC PNL TOTAL CA: CPT

## 2020-01-01 PROCEDURE — 74011250637 HC RX REV CODE- 250/637: Performed by: EMERGENCY MEDICINE

## 2020-01-01 PROCEDURE — 82803 BLOOD GASES ANY COMBINATION: CPT

## 2020-01-01 PROCEDURE — 81003 URINALYSIS AUTO W/O SCOPE: CPT

## 2020-01-01 PROCEDURE — 87340 HEPATITIS B SURFACE AG IA: CPT

## 2020-01-01 PROCEDURE — C1752 CATH,HEMODIALYSIS,SHORT-TERM: HCPCS

## 2020-01-01 PROCEDURE — 83605 ASSAY OF LACTIC ACID: CPT

## 2020-01-01 PROCEDURE — 83615 LACTATE (LD) (LDH) ENZYME: CPT

## 2020-01-01 PROCEDURE — 77030040393 HC DRSG OPTIFOAM GENT MDII -B

## 2020-01-01 PROCEDURE — 74011000250 HC RX REV CODE- 250

## 2020-01-01 PROCEDURE — 73120 X-RAY EXAM OF HAND: CPT

## 2020-01-01 PROCEDURE — 74011000250 HC RX REV CODE- 250: Performed by: FAMILY MEDICINE

## 2020-01-01 PROCEDURE — 87077 CULTURE AEROBIC IDENTIFY: CPT

## 2020-01-01 PROCEDURE — 87635 SARS-COV-2 COVID-19 AMP PRB: CPT

## 2020-01-01 PROCEDURE — 80053 COMPREHEN METABOLIC PANEL: CPT

## 2020-01-01 PROCEDURE — 94761 N-INVAS EAR/PLS OXIMETRY MLT: CPT

## 2020-01-01 PROCEDURE — 72202 X-RAY EXAM SI JOINTS 3/> VWS: CPT

## 2020-01-01 PROCEDURE — 85025 COMPLETE CBC W/AUTO DIFF WBC: CPT

## 2020-01-01 PROCEDURE — 74011250636 HC RX REV CODE- 250/636: Performed by: EMERGENCY MEDICINE

## 2020-01-01 PROCEDURE — 87186 SC STD MICRODIL/AGAR DIL: CPT

## 2020-01-01 PROCEDURE — 82947 ASSAY GLUCOSE BLOOD QUANT: CPT

## 2020-01-01 PROCEDURE — 96374 THER/PROPH/DIAG INJ IV PUSH: CPT

## 2020-01-01 PROCEDURE — 84132 ASSAY OF SERUM POTASSIUM: CPT

## 2020-01-01 PROCEDURE — 84100 ASSAY OF PHOSPHORUS: CPT

## 2020-01-01 PROCEDURE — 65270000029 HC RM PRIVATE

## 2020-01-01 PROCEDURE — 86900 BLOOD TYPING SEROLOGIC ABO: CPT

## 2020-01-01 PROCEDURE — 82728 ASSAY OF FERRITIN: CPT

## 2020-01-01 PROCEDURE — 87086 URINE CULTURE/COLONY COUNT: CPT

## 2020-01-01 PROCEDURE — 74011250636 HC RX REV CODE- 250/636

## 2020-01-01 PROCEDURE — 77030041247 HC PROTECTOR HEEL HEELMEDIX MDII -B

## 2020-01-01 PROCEDURE — C1751 CATH, INF, PER/CENT/MIDLINE: HCPCS

## 2020-01-01 PROCEDURE — 36415 COLL VENOUS BLD VENIPUNCTURE: CPT

## 2020-01-01 PROCEDURE — 36430 TRANSFUSION BLD/BLD COMPNT: CPT

## 2020-01-01 PROCEDURE — 02H633Z INSERTION OF INFUSION DEVICE INTO RIGHT ATRIUM, PERCUTANEOUS APPROACH: ICD-10-PCS | Performed by: INTERNAL MEDICINE

## 2020-01-01 PROCEDURE — 36573 INSJ PICC RS&I 5 YR+: CPT | Performed by: INTERNAL MEDICINE

## 2020-01-01 PROCEDURE — 77030008771 HC TU NG SALEM SUMP -A

## 2020-01-01 PROCEDURE — 74011000258 HC RX REV CODE- 258

## 2020-01-01 PROCEDURE — 77030013794 HC KT TRNSDUC BLD EDWD -B

## 2020-01-01 PROCEDURE — 85730 THROMBOPLASTIN TIME PARTIAL: CPT

## 2020-01-01 PROCEDURE — 83520 IMMUNOASSAY QUANT NOS NONAB: CPT

## 2020-01-01 PROCEDURE — 87205 SMEAR GRAM STAIN: CPT

## 2020-01-01 PROCEDURE — 72070 X-RAY EXAM THORAC SPINE 2VWS: CPT

## 2020-01-01 PROCEDURE — 93005 ELECTROCARDIOGRAM TRACING: CPT | Performed by: INTERNAL MEDICINE

## 2020-01-01 PROCEDURE — 77030013140 HC MSK NEB VYRM -A

## 2020-01-01 PROCEDURE — 94002 VENT MGMT INPAT INIT DAY: CPT

## 2020-01-01 PROCEDURE — 74011000258 HC RX REV CODE- 258: Performed by: FAMILY MEDICINE

## 2020-01-01 PROCEDURE — 0BH17EZ INSERTION OF ENDOTRACHEAL AIRWAY INTO TRACHEA, VIA NATURAL OR ARTIFICIAL OPENING: ICD-10-PCS | Performed by: INTERNAL MEDICINE

## 2020-01-01 PROCEDURE — 99285 EMERGENCY DEPT VISIT HI MDM: CPT

## 2020-01-01 PROCEDURE — 36556 INSERT NON-TUNNEL CV CATH: CPT | Performed by: INTERNAL MEDICINE

## 2020-01-01 PROCEDURE — 99223 1ST HOSP IP/OBS HIGH 75: CPT | Performed by: INTERNAL MEDICINE

## 2020-01-01 PROCEDURE — 02HV33Z INSERTION OF INFUSION DEVICE INTO SUPERIOR VENA CAVA, PERCUTANEOUS APPROACH: ICD-10-PCS | Performed by: INTERNAL MEDICINE

## 2020-01-01 PROCEDURE — 84478 ASSAY OF TRIGLYCERIDES: CPT

## 2020-01-01 RX ORDER — ACETAMINOPHEN 325 MG/1
650 TABLET ORAL
Status: DISCONTINUED | OUTPATIENT
Start: 2020-01-01 | End: 2020-01-01 | Stop reason: HOSPADM

## 2020-01-01 RX ORDER — HYDROCODONE BITARTRATE AND ACETAMINOPHEN 5; 325 MG/1; MG/1
1 TABLET ORAL
Status: DISCONTINUED | OUTPATIENT
Start: 2020-01-01 | End: 2020-01-01 | Stop reason: HOSPADM

## 2020-01-01 RX ORDER — METOPROLOL TARTRATE 5 MG/5ML
5 INJECTION INTRAVENOUS EVERY 4 HOURS
Status: DISCONTINUED | OUTPATIENT
Start: 2020-01-01 | End: 2020-01-01 | Stop reason: HOSPADM

## 2020-01-01 RX ORDER — DEXTROSE 50 % IN WATER (D50W) INTRAVENOUS SYRINGE
25 ONCE
Status: COMPLETED | OUTPATIENT
Start: 2020-01-01 | End: 2020-01-01

## 2020-01-01 RX ORDER — LORAZEPAM 2 MG/ML
1 INJECTION INTRAMUSCULAR ONCE
Status: COMPLETED | OUTPATIENT
Start: 2020-01-01 | End: 2020-01-01

## 2020-01-01 RX ORDER — FENTANYL CITRATE-0.9 % NACL/PF 25 MCG/ML
0-200 PLASTIC BAG, INJECTION (ML) INJECTION
Status: DISCONTINUED | OUTPATIENT
Start: 2020-01-01 | End: 2020-01-01

## 2020-01-01 RX ORDER — SODIUM POLYSTYRENE SULFONATE 15 G/60ML
15 SUSPENSION ORAL; RECTAL
Status: COMPLETED | OUTPATIENT
Start: 2020-01-01 | End: 2020-01-01

## 2020-01-01 RX ORDER — HEPARIN SODIUM 5000 [USP'U]/ML
7500 INJECTION, SOLUTION INTRAVENOUS; SUBCUTANEOUS EVERY 8 HOURS
Status: DISCONTINUED | OUTPATIENT
Start: 2020-01-01 | End: 2020-01-01 | Stop reason: HOSPADM

## 2020-01-01 RX ORDER — BUDESONIDE AND FORMOTEROL FUMARATE DIHYDRATE 80; 4.5 UG/1; UG/1
2 AEROSOL RESPIRATORY (INHALATION)
Status: DISCONTINUED | OUTPATIENT
Start: 2020-01-01 | End: 2020-01-01

## 2020-01-01 RX ORDER — MORPHINE SULFATE 2 MG/ML
2 INJECTION, SOLUTION INTRAMUSCULAR; INTRAVENOUS EVERY 6 HOURS
Status: DISCONTINUED | OUTPATIENT
Start: 2020-01-01 | End: 2020-01-01

## 2020-01-01 RX ORDER — FENTANYL CITRATE 50 UG/ML
100 INJECTION, SOLUTION INTRAMUSCULAR; INTRAVENOUS ONCE
Status: COMPLETED | OUTPATIENT
Start: 2020-01-01 | End: 2020-01-01

## 2020-01-01 RX ORDER — ACETAMINOPHEN 325 MG/1
650 TABLET ORAL
Status: DISCONTINUED | OUTPATIENT
Start: 2020-01-01 | End: 2020-01-01

## 2020-01-01 RX ORDER — HYDROCHLOROTHIAZIDE 25 MG/1
25 TABLET ORAL DAILY
Status: DISCONTINUED | OUTPATIENT
Start: 2020-01-01 | End: 2020-01-01 | Stop reason: HOSPADM

## 2020-01-01 RX ORDER — FENTANYL CITRATE 50 UG/ML
50 INJECTION, SOLUTION INTRAMUSCULAR; INTRAVENOUS
Status: DISCONTINUED | OUTPATIENT
Start: 2020-01-01 | End: 2020-01-01 | Stop reason: HOSPADM

## 2020-01-01 RX ORDER — FENTANYL CITRATE-0.9 % NACL/PF 25 MCG/ML
0-200 PLASTIC BAG, INJECTION (ML) INJECTION
Status: DISCONTINUED | OUTPATIENT
Start: 2020-01-01 | End: 2020-01-01 | Stop reason: HOSPADM

## 2020-01-01 RX ORDER — ATORVASTATIN CALCIUM 40 MG/1
40 TABLET, FILM COATED ORAL
Status: DISCONTINUED | OUTPATIENT
Start: 2020-01-01 | End: 2020-01-01

## 2020-01-01 RX ORDER — GUAIFENESIN 600 MG/1
1200 TABLET, EXTENDED RELEASE ORAL 2 TIMES DAILY
Status: DISCONTINUED | OUTPATIENT
Start: 2020-01-01 | End: 2020-01-01

## 2020-01-01 RX ORDER — PANTOPRAZOLE SODIUM 40 MG/1
40 TABLET, DELAYED RELEASE ORAL
Status: DISCONTINUED | OUTPATIENT
Start: 2020-01-01 | End: 2020-01-01

## 2020-01-01 RX ORDER — VENLAFAXINE 75 MG/1
75 TABLET ORAL 2 TIMES DAILY
Status: DISCONTINUED | OUTPATIENT
Start: 2020-01-01 | End: 2020-01-01 | Stop reason: HOSPADM

## 2020-01-01 RX ORDER — DEXTROSE MONOHYDRATE 50 MG/ML
75 INJECTION, SOLUTION INTRAVENOUS CONTINUOUS
Status: DISCONTINUED | OUTPATIENT
Start: 2020-01-01 | End: 2020-01-01

## 2020-01-01 RX ORDER — MIDAZOLAM IN 0.9 % SOD.CHLORID 1 MG/ML
0-10 PLASTIC BAG, INJECTION (ML) INTRAVENOUS
Status: DISCONTINUED | OUTPATIENT
Start: 2020-01-01 | End: 2020-01-01 | Stop reason: HOSPADM

## 2020-01-01 RX ORDER — NOREPINEPHRINE BITARTRATE/D5W 4MG/250ML
.5-3 PLASTIC BAG, INJECTION (ML) INTRAVENOUS
Status: DISCONTINUED | OUTPATIENT
Start: 2020-01-01 | End: 2020-01-01

## 2020-01-01 RX ORDER — DEXAMETHASONE 4 MG/1
4 TABLET ORAL EVERY 12 HOURS
Status: DISCONTINUED | OUTPATIENT
Start: 2020-01-01 | End: 2020-01-01

## 2020-01-01 RX ORDER — SODIUM BICARBONATE 84 MG/ML
100 INJECTION, SOLUTION INTRAVENOUS ONCE
Status: COMPLETED | OUTPATIENT
Start: 2020-01-01 | End: 2020-01-01

## 2020-01-01 RX ORDER — PROMETHAZINE HYDROCHLORIDE 25 MG/1
12.5 TABLET ORAL
Status: DISCONTINUED | OUTPATIENT
Start: 2020-01-01 | End: 2020-01-01 | Stop reason: HOSPADM

## 2020-01-01 RX ORDER — PROPRANOLOL HYDROCHLORIDE 60 MG/1
120 CAPSULE, EXTENDED RELEASE ORAL
Status: DISCONTINUED | OUTPATIENT
Start: 2020-01-01 | End: 2020-01-01 | Stop reason: ALTCHOICE

## 2020-01-01 RX ORDER — MIDAZOLAM HYDROCHLORIDE 1 MG/ML
5 INJECTION, SOLUTION INTRAMUSCULAR; INTRAVENOUS ONCE
Status: COMPLETED | OUTPATIENT
Start: 2020-01-01 | End: 2020-01-01

## 2020-01-01 RX ORDER — MIDAZOLAM IN 0.9 % SOD.CHLORID 1 MG/ML
0-10 PLASTIC BAG, INJECTION (ML) INTRAVENOUS
Status: DISCONTINUED | OUTPATIENT
Start: 2020-01-01 | End: 2020-01-01

## 2020-01-01 RX ORDER — UREA 10 %
220 LOTION (ML) TOPICAL DAILY
Status: DISCONTINUED | OUTPATIENT
Start: 2020-01-01 | End: 2020-01-01 | Stop reason: HOSPADM

## 2020-01-01 RX ORDER — DEXTROSE 50 % IN WATER (D50W) INTRAVENOUS SYRINGE
12.5 ONCE
Status: COMPLETED | OUTPATIENT
Start: 2020-01-01 | End: 2020-01-01

## 2020-01-01 RX ORDER — FUROSEMIDE 10 MG/ML
40 INJECTION INTRAMUSCULAR; INTRAVENOUS EVERY 12 HOURS
Status: COMPLETED | OUTPATIENT
Start: 2020-01-01 | End: 2020-01-01

## 2020-01-01 RX ORDER — BUDESONIDE 0.5 MG/2ML
500 INHALANT ORAL
Status: DISCONTINUED | OUTPATIENT
Start: 2020-01-01 | End: 2020-01-01 | Stop reason: HOSPADM

## 2020-01-01 RX ORDER — IPRATROPIUM BROMIDE AND ALBUTEROL SULFATE 2.5; .5 MG/3ML; MG/3ML
3 SOLUTION RESPIRATORY (INHALATION)
Status: DISCONTINUED | OUTPATIENT
Start: 2020-01-01 | End: 2020-01-01 | Stop reason: ALTCHOICE

## 2020-01-01 RX ORDER — HYDROCODONE BITARTRATE AND ACETAMINOPHEN 7.5; 325 MG/1; MG/1
1 TABLET ORAL
Status: DISCONTINUED | OUTPATIENT
Start: 2020-01-01 | End: 2020-01-01

## 2020-01-01 RX ORDER — MAG HYDROX/ALUMINUM HYD/SIMETH 200-200-20
30 SUSPENSION, ORAL (FINAL DOSE FORM) ORAL
Status: DISCONTINUED | OUTPATIENT
Start: 2020-01-01 | End: 2020-01-01 | Stop reason: HOSPADM

## 2020-01-01 RX ORDER — PROPOFOL 10 MG/ML
0-50 VIAL (ML) INTRAVENOUS
Status: DISCONTINUED | OUTPATIENT
Start: 2020-01-01 | End: 2020-01-01

## 2020-01-01 RX ORDER — AMOXICILLIN 250 MG
1 CAPSULE ORAL DAILY
Status: DISCONTINUED | OUTPATIENT
Start: 2020-01-01 | End: 2020-01-01 | Stop reason: HOSPADM

## 2020-01-01 RX ORDER — VENLAFAXINE HYDROCHLORIDE 150 MG/1
150 CAPSULE, EXTENDED RELEASE ORAL
Status: DISCONTINUED | OUTPATIENT
Start: 2020-01-01 | End: 2020-01-01

## 2020-01-01 RX ORDER — UREA 10 %
220 LOTION (ML) TOPICAL DAILY
Status: DISCONTINUED | OUTPATIENT
Start: 2020-01-01 | End: 2020-01-01

## 2020-01-01 RX ORDER — FENTANYL CITRATE 50 UG/ML
25 INJECTION, SOLUTION INTRAMUSCULAR; INTRAVENOUS
Status: DISCONTINUED | OUTPATIENT
Start: 2020-01-01 | End: 2020-01-01

## 2020-01-01 RX ORDER — POTASSIUM CHLORIDE 14.9 MG/ML
20 INJECTION INTRAVENOUS
Status: COMPLETED | OUTPATIENT
Start: 2020-01-01 | End: 2020-01-01

## 2020-01-01 RX ORDER — PROPOFOL 10 MG/ML
INJECTION, EMULSION INTRAVENOUS
Status: COMPLETED
Start: 2020-01-01 | End: 2020-01-01

## 2020-01-01 RX ORDER — SODIUM CHLORIDE 0.9 % (FLUSH) 0.9 %
30 SYRINGE (ML) INJECTION AS NEEDED
Status: DISCONTINUED | OUTPATIENT
Start: 2020-01-01 | End: 2020-01-01 | Stop reason: HOSPADM

## 2020-01-01 RX ORDER — ZOLPIDEM TARTRATE 5 MG/1
10 TABLET ORAL
Status: DISCONTINUED | OUTPATIENT
Start: 2020-01-01 | End: 2020-01-01 | Stop reason: HOSPADM

## 2020-01-01 RX ORDER — ACETAMINOPHEN 325 MG/1
650 TABLET ORAL
Status: DISCONTINUED | OUTPATIENT
Start: 2020-01-01 | End: 2020-01-01 | Stop reason: SDUPTHER

## 2020-01-01 RX ORDER — DOXYCYCLINE 100 MG/1
100 CAPSULE ORAL EVERY 12 HOURS
Status: DISCONTINUED | OUTPATIENT
Start: 2020-01-01 | End: 2020-01-01 | Stop reason: ALTCHOICE

## 2020-01-01 RX ORDER — PROPOFOL 10 MG/ML
0-50 VIAL (ML) INTRAVENOUS
Status: DISCONTINUED | OUTPATIENT
Start: 2020-01-01 | End: 2020-01-01 | Stop reason: HOSPADM

## 2020-01-01 RX ORDER — ENOXAPARIN SODIUM 100 MG/ML
40 INJECTION SUBCUTANEOUS EVERY 12 HOURS
Status: DISCONTINUED | OUTPATIENT
Start: 2020-01-01 | End: 2020-01-01

## 2020-01-01 RX ORDER — SODIUM CHLORIDE 0.9 % (FLUSH) 0.9 %
5-40 SYRINGE (ML) INJECTION AS NEEDED
Status: DISCONTINUED | OUTPATIENT
Start: 2020-01-01 | End: 2020-01-01 | Stop reason: HOSPADM

## 2020-01-01 RX ORDER — ASCORBIC ACID 500 MG
250 TABLET ORAL 2 TIMES DAILY
Status: DISCONTINUED | OUTPATIENT
Start: 2020-01-01 | End: 2020-01-01

## 2020-01-01 RX ORDER — POTASSIUM CHLORIDE 20 MEQ/1
20 TABLET, EXTENDED RELEASE ORAL DAILY
Status: DISCONTINUED | OUTPATIENT
Start: 2020-01-01 | End: 2020-01-01

## 2020-01-01 RX ORDER — POLYETHYLENE GLYCOL 3350 17 G/17G
17 POWDER, FOR SOLUTION ORAL DAILY
Status: DISCONTINUED | OUTPATIENT
Start: 2020-01-01 | End: 2020-01-01 | Stop reason: HOSPADM

## 2020-01-01 RX ORDER — POLYETHYLENE GLYCOL 3350 17 G/17G
17 POWDER, FOR SOLUTION ORAL DAILY PRN
Status: DISCONTINUED | OUTPATIENT
Start: 2020-01-01 | End: 2020-01-01

## 2020-01-01 RX ORDER — LINEZOLID 2 MG/ML
600 INJECTION, SOLUTION INTRAVENOUS EVERY 12 HOURS
Status: DISCONTINUED | OUTPATIENT
Start: 2020-01-01 | End: 2020-01-01 | Stop reason: HOSPADM

## 2020-01-01 RX ORDER — SODIUM BICARBONATE 84 MG/ML
150 INJECTION, SOLUTION INTRAVENOUS ONCE
Status: COMPLETED | OUTPATIENT
Start: 2020-01-01 | End: 2020-01-01

## 2020-01-01 RX ORDER — DEXAMETHASONE SODIUM PHOSPHATE 4 MG/ML
6 INJECTION, SOLUTION INTRA-ARTICULAR; INTRALESIONAL; INTRAMUSCULAR; INTRAVENOUS; SOFT TISSUE ONCE
Status: COMPLETED | OUTPATIENT
Start: 2020-01-01 | End: 2020-01-01

## 2020-01-01 RX ORDER — MORPHINE SULFATE 2 MG/ML
2 INJECTION, SOLUTION INTRAMUSCULAR; INTRAVENOUS
Status: DISCONTINUED | OUTPATIENT
Start: 2020-01-01 | End: 2020-01-01

## 2020-01-01 RX ORDER — SODIUM CHLORIDE 0.9 % (FLUSH) 0.9 %
30 SYRINGE (ML) INJECTION EVERY 8 HOURS
Status: DISCONTINUED | OUTPATIENT
Start: 2020-01-01 | End: 2020-01-01 | Stop reason: HOSPADM

## 2020-01-01 RX ORDER — ETOMIDATE 2 MG/ML
INJECTION INTRAVENOUS
Status: COMPLETED
Start: 2020-01-01 | End: 2020-01-01

## 2020-01-01 RX ORDER — LORAZEPAM 2 MG/ML
1 INJECTION INTRAMUSCULAR
Status: DISCONTINUED | OUTPATIENT
Start: 2020-01-01 | End: 2020-01-01

## 2020-01-01 RX ORDER — SODIUM CHLORIDE 9 MG/ML
250 INJECTION, SOLUTION INTRAVENOUS AS NEEDED
Status: DISCONTINUED | OUTPATIENT
Start: 2020-01-01 | End: 2020-01-01 | Stop reason: HOSPADM

## 2020-01-01 RX ORDER — DEXAMETHASONE SODIUM PHOSPHATE 4 MG/ML
6 INJECTION, SOLUTION INTRA-ARTICULAR; INTRALESIONAL; INTRAMUSCULAR; INTRAVENOUS; SOFT TISSUE DAILY
Status: COMPLETED | OUTPATIENT
Start: 2020-01-01 | End: 2020-01-01

## 2020-01-01 RX ORDER — DEXAMETHASONE SODIUM PHOSPHATE 100 MG/10ML
6 INJECTION INTRAMUSCULAR; INTRAVENOUS
Status: COMPLETED | OUTPATIENT
Start: 2020-01-01 | End: 2020-01-01

## 2020-01-01 RX ORDER — SODIUM BICARBONATE 84 MG/ML
INJECTION, SOLUTION INTRAVENOUS
Status: COMPLETED
Start: 2020-01-01 | End: 2020-01-01

## 2020-01-01 RX ORDER — ALBUTEROL SULFATE 90 UG/1
2 AEROSOL, METERED RESPIRATORY (INHALATION)
Status: DISCONTINUED | OUTPATIENT
Start: 2020-01-01 | End: 2020-01-01

## 2020-01-01 RX ORDER — SODIUM BICARBONATE 84 MG/ML
50 INJECTION, SOLUTION INTRAVENOUS ONCE
Status: COMPLETED | OUTPATIENT
Start: 2020-01-01 | End: 2020-01-01

## 2020-01-01 RX ORDER — MIDAZOLAM HYDROCHLORIDE 1 MG/ML
INJECTION, SOLUTION INTRAMUSCULAR; INTRAVENOUS
Status: COMPLETED
Start: 2020-01-01 | End: 2020-01-01

## 2020-01-01 RX ORDER — ACETAMINOPHEN 650 MG/1
650 SUPPOSITORY RECTAL
Status: DISCONTINUED | OUTPATIENT
Start: 2020-01-01 | End: 2020-01-01 | Stop reason: HOSPADM

## 2020-01-01 RX ORDER — ACETAMINOPHEN 500 MG
1000 TABLET ORAL
Status: COMPLETED | OUTPATIENT
Start: 2020-01-01 | End: 2020-01-01

## 2020-01-01 RX ORDER — DEXMEDETOMIDINE HYDROCHLORIDE 4 UG/ML
.1-1.5 INJECTION, SOLUTION INTRAVENOUS
Status: DISCONTINUED | OUTPATIENT
Start: 2020-01-01 | End: 2020-01-01

## 2020-01-01 RX ORDER — PROPRANOLOL HYDROCHLORIDE 60 MG/1
120 CAPSULE, EXTENDED RELEASE ORAL
Status: DISCONTINUED | OUTPATIENT
Start: 2020-01-01 | End: 2020-01-01

## 2020-01-01 RX ORDER — LORAZEPAM 0.5 MG/1
0.5 TABLET ORAL
Status: DISCONTINUED | OUTPATIENT
Start: 2020-01-01 | End: 2020-01-01 | Stop reason: HOSPADM

## 2020-01-01 RX ORDER — PROPOFOL 10 MG/ML
100 INJECTION, EMULSION INTRAVENOUS
Status: COMPLETED | OUTPATIENT
Start: 2020-01-01 | End: 2020-01-01

## 2020-01-01 RX ORDER — DEXAMETHASONE 4 MG/1
6 TABLET ORAL DAILY
Status: DISCONTINUED | OUTPATIENT
Start: 2020-01-01 | End: 2020-01-01

## 2020-01-01 RX ORDER — NOREPINEPHRINE BITARTRATE/D5W 4MG/250ML
PLASTIC BAG, INJECTION (ML) INTRAVENOUS
Status: COMPLETED
Start: 2020-01-01 | End: 2020-01-01

## 2020-01-01 RX ORDER — HYDROCODONE BITARTRATE AND ACETAMINOPHEN 10; 325 MG/1; MG/1
1 TABLET ORAL
Status: DISCONTINUED | OUTPATIENT
Start: 2020-01-01 | End: 2020-01-01 | Stop reason: HOSPADM

## 2020-01-01 RX ORDER — ETOMIDATE 2 MG/ML
30 INJECTION INTRAVENOUS ONCE
Status: COMPLETED | OUTPATIENT
Start: 2020-01-01 | End: 2020-01-01

## 2020-01-01 RX ORDER — SODIUM CHLORIDE 9 MG/ML
50 INJECTION, SOLUTION INTRAVENOUS CONTINUOUS
Status: DISCONTINUED | OUTPATIENT
Start: 2020-01-01 | End: 2020-01-01

## 2020-01-01 RX ORDER — SODIUM POLYSTYRENE SULFONATE 15 G/60ML
30 SUSPENSION ORAL; RECTAL
Status: DISCONTINUED | OUTPATIENT
Start: 2020-01-01 | End: 2020-01-01

## 2020-01-01 RX ORDER — ONDANSETRON 2 MG/ML
4 INJECTION INTRAMUSCULAR; INTRAVENOUS
Status: DISCONTINUED | OUTPATIENT
Start: 2020-01-01 | End: 2020-01-01 | Stop reason: HOSPADM

## 2020-01-01 RX ORDER — FENTANYL CITRATE 50 UG/ML
INJECTION, SOLUTION INTRAMUSCULAR; INTRAVENOUS
Status: COMPLETED
Start: 2020-01-01 | End: 2020-01-01

## 2020-01-01 RX ORDER — SUCCINYLCHOLINE CHLORIDE 20 MG/ML INJECTION SOLUTION
SOLUTION
Status: ACTIVE
Start: 2020-01-01 | End: 2020-01-01

## 2020-01-01 RX ORDER — ALBUTEROL SULFATE 0.83 MG/ML
2.5 SOLUTION RESPIRATORY (INHALATION)
Status: DISCONTINUED | OUTPATIENT
Start: 2020-01-01 | End: 2020-01-01 | Stop reason: HOSPADM

## 2020-01-01 RX ORDER — HEPARIN SODIUM 1000 [USP'U]/ML
5000 INJECTION, SOLUTION INTRAVENOUS; SUBCUTANEOUS
Status: DISCONTINUED | OUTPATIENT
Start: 2020-01-01 | End: 2020-01-01 | Stop reason: HOSPADM

## 2020-01-01 RX ORDER — DEXAMETHASONE 4 MG/1
2 TABLET ORAL ONCE
Status: COMPLETED | OUTPATIENT
Start: 2020-01-01 | End: 2020-01-01

## 2020-01-01 RX ORDER — FUROSEMIDE 20 MG/1
40 TABLET ORAL DAILY
Status: DISCONTINUED | OUTPATIENT
Start: 2020-01-01 | End: 2020-01-01 | Stop reason: HOSPADM

## 2020-01-01 RX ORDER — FENTANYL CITRATE 50 UG/ML
INJECTION, SOLUTION INTRAMUSCULAR; INTRAVENOUS
Status: ACTIVE
Start: 2020-01-01 | End: 2020-01-01

## 2020-01-01 RX ORDER — SODIUM CHLORIDE 9 MG/ML
100 INJECTION, SOLUTION INTRAVENOUS CONTINUOUS
Status: DISCONTINUED | OUTPATIENT
Start: 2020-01-01 | End: 2020-01-01

## 2020-01-01 RX ORDER — SODIUM CHLORIDE 0.9 % (FLUSH) 0.9 %
5-40 SYRINGE (ML) INJECTION EVERY 8 HOURS
Status: DISCONTINUED | OUTPATIENT
Start: 2020-01-01 | End: 2020-01-01 | Stop reason: HOSPADM

## 2020-01-01 RX ORDER — ALBUTEROL SULFATE 90 UG/1
2 AEROSOL, METERED RESPIRATORY (INHALATION)
Status: DISCONTINUED | OUTPATIENT
Start: 2020-01-01 | End: 2020-01-01 | Stop reason: HOSPADM

## 2020-01-01 RX ORDER — ESMOLOL HYDROCHLORIDE 10 MG/ML
0-200 INJECTION INTRAVENOUS
Status: DISCONTINUED | OUTPATIENT
Start: 2020-01-01 | End: 2020-01-01 | Stop reason: HOSPADM

## 2020-01-01 RX ORDER — LORAZEPAM 2 MG/ML
2 INJECTION INTRAMUSCULAR
Status: DISCONTINUED | OUTPATIENT
Start: 2020-01-01 | End: 2020-01-01 | Stop reason: HOSPADM

## 2020-01-01 RX ORDER — ACETAMINOPHEN 650 MG/1
650 SUPPOSITORY RECTAL
Status: DISCONTINUED | OUTPATIENT
Start: 2020-01-01 | End: 2020-01-01

## 2020-01-01 RX ORDER — ASCORBIC ACID 500 MG
1000 TABLET ORAL 2 TIMES DAILY
Status: DISCONTINUED | OUTPATIENT
Start: 2020-01-01 | End: 2020-01-01 | Stop reason: HOSPADM

## 2020-01-01 RX ORDER — SUCCINYLCHOLINE CHLORIDE 20 MG/ML INJECTION SOLUTION
40 SOLUTION
Status: DISCONTINUED | OUTPATIENT
Start: 2020-01-01 | End: 2020-01-01

## 2020-01-01 RX ADMIN — ENOXAPARIN SODIUM 40 MG: 40 INJECTION SUBCUTANEOUS at 10:18

## 2020-01-01 RX ADMIN — PROPOFOL 30 MCG/KG/MIN: 10 INJECTION, EMULSION INTRAVENOUS at 21:50

## 2020-01-01 RX ADMIN — SODIUM CHLORIDE 1000 ML: 900 INJECTION, SOLUTION INTRAVENOUS at 18:53

## 2020-01-01 RX ADMIN — Medication 30 ML: at 05:05

## 2020-01-01 RX ADMIN — Medication 10 ML: at 06:00

## 2020-01-01 RX ADMIN — ALBUTEROL SULFATE 2.5 MG: 2.5 SOLUTION RESPIRATORY (INHALATION) at 11:47

## 2020-01-01 RX ADMIN — METOPROLOL TARTRATE 5 MG: 5 INJECTION INTRAVENOUS at 04:23

## 2020-01-01 RX ADMIN — ENOXAPARIN SODIUM 40 MG: 40 INJECTION SUBCUTANEOUS at 08:45

## 2020-01-01 RX ADMIN — ACETAMINOPHEN 650 MG: 325 TABLET, FILM COATED ORAL at 07:31

## 2020-01-01 RX ADMIN — LINEZOLID 600 MG: 600 INJECTION, SOLUTION INTRAVENOUS at 16:04

## 2020-01-01 RX ADMIN — HYDROCODONE BITARTRATE AND ACETAMINOPHEN 1 TABLET: 7.5; 325 TABLET ORAL at 23:35

## 2020-01-01 RX ADMIN — BUDESONIDE 500 MCG: 0.5 INHALANT RESPIRATORY (INHALATION) at 21:00

## 2020-01-01 RX ADMIN — HUMAN INSULIN 2 UNITS: 100 INJECTION, SOLUTION SUBCUTANEOUS at 12:55

## 2020-01-01 RX ADMIN — PANTOPRAZOLE SODIUM 40 MG: 40 INJECTION, POWDER, FOR SOLUTION INTRAVENOUS at 12:13

## 2020-01-01 RX ADMIN — Medication 30 ML: at 09:00

## 2020-01-01 RX ADMIN — LORAZEPAM 0.5 MG: 0.5 TABLET ORAL at 17:45

## 2020-01-01 RX ADMIN — METOPROLOL TARTRATE 5 MG: 5 INJECTION INTRAVENOUS at 20:33

## 2020-01-01 RX ADMIN — Medication 10 ML: at 20:17

## 2020-01-01 RX ADMIN — Medication 30 ML: at 14:00

## 2020-01-01 RX ADMIN — DOXYCYCLINE HYCLATE 100 MG: 100 CAPSULE ORAL at 21:05

## 2020-01-01 RX ADMIN — POLYETHYLENE GLYCOL 3350 17 G: 17 POWDER, FOR SOLUTION ORAL at 08:40

## 2020-01-01 RX ADMIN — Medication 175 MCG/HR: at 13:41

## 2020-01-01 RX ADMIN — Medication 10 ML: at 21:02

## 2020-01-01 RX ADMIN — HUMAN INSULIN 4 UNITS: 100 INJECTION, SOLUTION SUBCUTANEOUS at 06:25

## 2020-01-01 RX ADMIN — DEXMEDETOMIDINE 0.2 MCG/KG/HR: 100 INJECTION, SOLUTION, CONCENTRATE INTRAVENOUS at 15:20

## 2020-01-01 RX ADMIN — Medication 30 ML: at 12:00

## 2020-01-01 RX ADMIN — MIDAZOLAM HYDROCHLORIDE 6 MG/HR: 5 INJECTION, SOLUTION INTRAMUSCULAR; INTRAVENOUS at 00:01

## 2020-01-01 RX ADMIN — Medication 30 ML: at 22:00

## 2020-01-01 RX ADMIN — Medication 10 ML: at 15:22

## 2020-01-01 RX ADMIN — Medication 220 MG: at 08:34

## 2020-01-01 RX ADMIN — ENOXAPARIN SODIUM 40 MG: 40 INJECTION SUBCUTANEOUS at 21:40

## 2020-01-01 RX ADMIN — HYDROCODONE BITARTRATE AND ACETAMINOPHEN 1 TABLET: 10; 325 TABLET ORAL at 20:26

## 2020-01-01 RX ADMIN — ENOXAPARIN SODIUM 40 MG: 40 INJECTION SUBCUTANEOUS at 09:00

## 2020-01-01 RX ADMIN — PROPOFOL 20 MCG/KG/MIN: 10 INJECTION, EMULSION INTRAVENOUS at 08:13

## 2020-01-01 RX ADMIN — POTASSIUM BICARBONATE 40 MEQ: 782 TABLET, EFFERVESCENT ORAL at 17:18

## 2020-01-01 RX ADMIN — Medication 10 ML: at 05:45

## 2020-01-01 RX ADMIN — ENOXAPARIN SODIUM 40 MG: 40 INJECTION SUBCUTANEOUS at 08:08

## 2020-01-01 RX ADMIN — VENLAFAXINE 75 MG: 75 TABLET ORAL at 09:00

## 2020-01-01 RX ADMIN — FUROSEMIDE 40 MG: 10 INJECTION, SOLUTION INTRAMUSCULAR; INTRAVENOUS at 20:28

## 2020-01-01 RX ADMIN — DOXYCYCLINE HYCLATE 100 MG: 100 CAPSULE ORAL at 09:13

## 2020-01-01 RX ADMIN — VENLAFAXINE 75 MG: 75 TABLET ORAL at 08:22

## 2020-01-01 RX ADMIN — Medication 10 ML: at 13:37

## 2020-01-01 RX ADMIN — Medication 220 MG: at 08:22

## 2020-01-01 RX ADMIN — METHYLNALTREXONE BROMIDE 12 MG: 12 INJECTION, SOLUTION SUBCUTANEOUS at 10:25

## 2020-01-01 RX ADMIN — MINERAL OIL AND WHITE PETROLATUM: 150; 830 OINTMENT OPHTHALMIC at 07:38

## 2020-01-01 RX ADMIN — VENLAFAXINE HYDROCHLORIDE 150 MG: 150 CAPSULE, EXTENDED RELEASE ORAL at 08:09

## 2020-01-01 RX ADMIN — Medication 150 MCG/HR: at 10:30

## 2020-01-01 RX ADMIN — CALCIUM GLUCONATE 1 G: 98 INJECTION, SOLUTION INTRAVENOUS at 05:35

## 2020-01-01 RX ADMIN — HUMAN INSULIN 2 UNITS: 100 INJECTION, SOLUTION SUBCUTANEOUS at 06:00

## 2020-01-01 RX ADMIN — Medication 220 MG: at 09:13

## 2020-01-01 RX ADMIN — ALBUTEROL SULFATE 2.5 MG: 2.5 SOLUTION RESPIRATORY (INHALATION) at 16:10

## 2020-01-01 RX ADMIN — ENOXAPARIN SODIUM 40 MG: 40 INJECTION SUBCUTANEOUS at 08:34

## 2020-01-01 RX ADMIN — HUMAN INSULIN 4 UNITS: 100 INJECTION, SOLUTION SUBCUTANEOUS at 17:55

## 2020-01-01 RX ADMIN — Medication 220 MG: at 07:49

## 2020-01-01 RX ADMIN — I.V. FAT EMULSION 250 ML: 20 EMULSION INTRAVENOUS at 17:58

## 2020-01-01 RX ADMIN — ALUMINUM HYDROXIDE, MAGNESIUM HYDROXIDE, AND SIMETHICONE 30 ML: 200; 200; 20 SUSPENSION ORAL at 11:40

## 2020-01-01 RX ADMIN — ENOXAPARIN SODIUM 40 MG: 40 INJECTION SUBCUTANEOUS at 21:55

## 2020-01-01 RX ADMIN — PROPOFOL 40 MCG/KG/MIN: 10 INJECTION, EMULSION INTRAVENOUS at 12:54

## 2020-01-01 RX ADMIN — MINERAL OIL AND WHITE PETROLATUM: 150; 830 OINTMENT OPHTHALMIC at 09:01

## 2020-01-01 RX ADMIN — DEXTROSE MONOHYDRATE 75 ML/HR: 5 INJECTION, SOLUTION INTRAVENOUS at 12:01

## 2020-01-01 RX ADMIN — IPRATROPIUM BROMIDE AND ALBUTEROL SULFATE 3 ML: .5; 3 SOLUTION RESPIRATORY (INHALATION) at 03:19

## 2020-01-01 RX ADMIN — ASCORBIC ACID 1.5 G: 500 INJECTION INTRAVENOUS at 17:03

## 2020-01-01 RX ADMIN — PROPOFOL 100 MG: 10 INJECTION, EMULSION INTRAVENOUS at 17:00

## 2020-01-01 RX ADMIN — BUDESONIDE AND FORMOTEROL FUMARATE DIHYDRATE 2 PUFF: 80; 4.5 AEROSOL RESPIRATORY (INHALATION) at 20:37

## 2020-01-01 RX ADMIN — ALBUTEROL SULFATE 2.5 MG: 2.5 SOLUTION RESPIRATORY (INHALATION) at 14:41

## 2020-01-01 RX ADMIN — DOXYCYCLINE 100 MG: 100 INJECTION, POWDER, LYOPHILIZED, FOR SOLUTION INTRAVENOUS at 21:41

## 2020-01-01 RX ADMIN — PROPOFOL 25 MCG/KG/MIN: 10 INJECTION, EMULSION INTRAVENOUS at 17:16

## 2020-01-01 RX ADMIN — BUDESONIDE 500 MCG: 0.5 INHALANT RESPIRATORY (INHALATION) at 07:30

## 2020-01-01 RX ADMIN — MIDAZOLAM HYDROCHLORIDE 7 MG/HR: 5 INJECTION, SOLUTION INTRAMUSCULAR; INTRAVENOUS at 14:11

## 2020-01-01 RX ADMIN — POTASSIUM CHLORIDE 20 MEQ: 200 INJECTION, SOLUTION INTRAVENOUS at 06:19

## 2020-01-01 RX ADMIN — BUDESONIDE AND FORMOTEROL FUMARATE DIHYDRATE 2 PUFF: 80; 4.5 AEROSOL RESPIRATORY (INHALATION) at 07:53

## 2020-01-01 RX ADMIN — FENTANYL CITRATE 25 MCG: 50 INJECTION, SOLUTION INTRAMUSCULAR; INTRAVENOUS at 08:46

## 2020-01-01 RX ADMIN — BUDESONIDE 500 MCG: 0.5 INHALANT RESPIRATORY (INHALATION) at 19:22

## 2020-01-01 RX ADMIN — DEXMEDETOMIDINE 0.6 MCG/KG/HR: 100 INJECTION, SOLUTION, CONCENTRATE INTRAVENOUS at 03:53

## 2020-01-01 RX ADMIN — DEXMEDETOMIDINE 1.2 MCG/KG/HR: 100 INJECTION, SOLUTION, CONCENTRATE INTRAVENOUS at 13:38

## 2020-01-01 RX ADMIN — PROPOFOL 35 MCG/KG/MIN: 10 INJECTION, EMULSION INTRAVENOUS at 21:41

## 2020-01-01 RX ADMIN — MEROPENEM 500 MG: 500 INJECTION, POWDER, FOR SOLUTION INTRAVENOUS at 16:08

## 2020-01-01 RX ADMIN — PANTOPRAZOLE SODIUM 40 MG: 40 TABLET, DELAYED RELEASE ORAL at 08:09

## 2020-01-01 RX ADMIN — HUMAN INSULIN 4 UNITS: 100 INJECTION, SOLUTION SUBCUTANEOUS at 23:23

## 2020-01-01 RX ADMIN — Medication 30 ML: at 13:12

## 2020-01-01 RX ADMIN — DEXAMETHASONE 4 MG: 4 TABLET ORAL at 08:55

## 2020-01-01 RX ADMIN — Medication 100 MCG/HR: at 16:51

## 2020-01-01 RX ADMIN — ALBUTEROL SULFATE 2.5 MG: 2.5 SOLUTION RESPIRATORY (INHALATION) at 20:24

## 2020-01-01 RX ADMIN — HUMAN INSULIN 2 UNITS: 100 INJECTION, SOLUTION SUBCUTANEOUS at 05:41

## 2020-01-01 RX ADMIN — ACETAMINOPHEN 1000 MG: 500 TABLET, FILM COATED ORAL at 18:53

## 2020-01-01 RX ADMIN — METOPROLOL TARTRATE 5 MG: 5 INJECTION INTRAVENOUS at 08:14

## 2020-01-01 RX ADMIN — SODIUM CHLORIDE 100 ML/HR: 900 INJECTION, SOLUTION INTRAVENOUS at 08:34

## 2020-01-01 RX ADMIN — CALCIUM GLUCONATE 1 G: 98 INJECTION, SOLUTION INTRAVENOUS at 20:27

## 2020-01-01 RX ADMIN — Medication 220 MG: at 08:35

## 2020-01-01 RX ADMIN — METOPROLOL TARTRATE 5 MG: 5 INJECTION INTRAVENOUS at 05:26

## 2020-01-01 RX ADMIN — METOPROLOL TARTRATE 5 MG: 5 INJECTION INTRAVENOUS at 16:00

## 2020-01-01 RX ADMIN — ZOLPIDEM TARTRATE 5 MG: 5 TABLET ORAL at 22:08

## 2020-01-01 RX ADMIN — METOPROLOL TARTRATE 5 MG: 5 INJECTION INTRAVENOUS at 19:21

## 2020-01-01 RX ADMIN — SODIUM CHLORIDE 250 ML: 900 INJECTION, SOLUTION INTRAVENOUS at 00:00

## 2020-01-01 RX ADMIN — DOXYCYCLINE HYCLATE 100 MG: 100 CAPSULE ORAL at 11:11

## 2020-01-01 RX ADMIN — POTASSIUM CHLORIDE 20 MEQ: 20 TABLET, EXTENDED RELEASE ORAL at 08:09

## 2020-01-01 RX ADMIN — DOXYCYCLINE 100 MG: 100 INJECTION, POWDER, LYOPHILIZED, FOR SOLUTION INTRAVENOUS at 22:04

## 2020-01-01 RX ADMIN — HUMAN INSULIN 6 UNITS: 100 INJECTION, SOLUTION SUBCUTANEOUS at 23:53

## 2020-01-01 RX ADMIN — ATORVASTATIN CALCIUM 40 MG: 40 TABLET, FILM COATED ORAL at 01:15

## 2020-01-01 RX ADMIN — OXYCODONE HYDROCHLORIDE AND ACETAMINOPHEN 1000 MG: 500 TABLET ORAL at 17:19

## 2020-01-01 RX ADMIN — ASCORBIC ACID 1.5 G: 500 INJECTION INTRAVENOUS at 08:03

## 2020-01-01 RX ADMIN — SENNOSIDES AND DOCUSATE SODIUM 1 TABLET: 8.6; 5 TABLET ORAL at 08:41

## 2020-01-01 RX ADMIN — Medication 75 MCG/HR: at 07:30

## 2020-01-01 RX ADMIN — POLYETHYLENE GLYCOL 3350 17 G: 17 POWDER, FOR SOLUTION ORAL at 10:18

## 2020-01-01 RX ADMIN — Medication 10 ML: at 23:26

## 2020-01-01 RX ADMIN — OXYCODONE HYDROCHLORIDE AND ACETAMINOPHEN 250 MG: 500 TABLET ORAL at 07:50

## 2020-01-01 RX ADMIN — ENOXAPARIN SODIUM 40 MG: 40 INJECTION SUBCUTANEOUS at 20:09

## 2020-01-01 RX ADMIN — PROMETHAZINE HYDROCHLORIDE 12.5 MG: 25 TABLET ORAL at 23:35

## 2020-01-01 RX ADMIN — Medication 30 ML: at 06:00

## 2020-01-01 RX ADMIN — BUDESONIDE 500 MCG: 0.5 INHALANT RESPIRATORY (INHALATION) at 20:14

## 2020-01-01 RX ADMIN — DEXAMETHASONE 6 MG: 4 TABLET ORAL at 07:47

## 2020-01-01 RX ADMIN — Medication 30 ML: at 21:41

## 2020-01-01 RX ADMIN — METOPROLOL TARTRATE 5 MG: 5 INJECTION INTRAVENOUS at 15:26

## 2020-01-01 RX ADMIN — ENOXAPARIN SODIUM 40 MG: 40 INJECTION SUBCUTANEOUS at 20:16

## 2020-01-01 RX ADMIN — OXYCODONE HYDROCHLORIDE AND ACETAMINOPHEN 1000 MG: 500 TABLET ORAL at 17:45

## 2020-01-01 RX ADMIN — SODIUM BICARBONATE 50 MEQ: 84 INJECTION, SOLUTION INTRAVENOUS at 02:13

## 2020-01-01 RX ADMIN — PANTOPRAZOLE SODIUM 40 MG: 40 INJECTION, POWDER, FOR SOLUTION INTRAVENOUS at 12:11

## 2020-01-01 RX ADMIN — DEXMEDETOMIDINE 1.1 MCG/KG/HR: 100 INJECTION, SOLUTION, CONCENTRATE INTRAVENOUS at 13:29

## 2020-01-01 RX ADMIN — VENLAFAXINE HYDROCHLORIDE 150 MG: 150 CAPSULE, EXTENDED RELEASE ORAL at 08:34

## 2020-01-01 RX ADMIN — HUMAN INSULIN 2 UNITS: 100 INJECTION, SOLUTION SUBCUTANEOUS at 05:05

## 2020-01-01 RX ADMIN — IPRATROPIUM BROMIDE AND ALBUTEROL SULFATE 3 ML: .5; 3 SOLUTION RESPIRATORY (INHALATION) at 19:22

## 2020-01-01 RX ADMIN — POTASSIUM CHLORIDE 20 MEQ: 20 TABLET, EXTENDED RELEASE ORAL at 07:50

## 2020-01-01 RX ADMIN — PROPOFOL 20 MCG/KG/MIN: 10 INJECTION, EMULSION INTRAVENOUS at 15:24

## 2020-01-01 RX ADMIN — METOPROLOL TARTRATE 5 MG: 5 INJECTION INTRAVENOUS at 12:00

## 2020-01-01 RX ADMIN — VENLAFAXINE 75 MG: 75 TABLET ORAL at 08:08

## 2020-01-01 RX ADMIN — PROPOFOL 20 MCG/KG/MIN: 10 INJECTION, EMULSION INTRAVENOUS at 00:43

## 2020-01-01 RX ADMIN — ATORVASTATIN CALCIUM 40 MG: 40 TABLET, FILM COATED ORAL at 20:16

## 2020-01-01 RX ADMIN — METOPROLOL TARTRATE 5 MG: 5 INJECTION INTRAVENOUS at 12:55

## 2020-01-01 RX ADMIN — Medication 10 ML: at 05:22

## 2020-01-01 RX ADMIN — BUDESONIDE AND FORMOTEROL FUMARATE DIHYDRATE 2 PUFF: 80; 4.5 AEROSOL RESPIRATORY (INHALATION) at 19:39

## 2020-01-01 RX ADMIN — ENOXAPARIN SODIUM 40 MG: 40 INJECTION SUBCUTANEOUS at 09:01

## 2020-01-01 RX ADMIN — IPRATROPIUM BROMIDE AND ALBUTEROL SULFATE 3 ML: .5; 3 SOLUTION RESPIRATORY (INHALATION) at 02:25

## 2020-01-01 RX ADMIN — Medication 10 ML: at 21:19

## 2020-01-01 RX ADMIN — ENOXAPARIN SODIUM 40 MG: 40 INJECTION SUBCUTANEOUS at 20:32

## 2020-01-01 RX ADMIN — ACETAMINOPHEN 650 MG: 325 TABLET, FILM COATED ORAL at 13:08

## 2020-01-01 RX ADMIN — SODIUM BICARBONATE 50 MEQ: 84 INJECTION, SOLUTION INTRAVENOUS at 20:27

## 2020-01-01 RX ADMIN — Medication 175 MCG/HR: at 17:33

## 2020-01-01 RX ADMIN — DOXYCYCLINE HYCLATE 100 MG: 100 CAPSULE ORAL at 07:48

## 2020-01-01 RX ADMIN — SODIUM CHLORIDE 1000 ML: 900 INJECTION, SOLUTION INTRAVENOUS at 16:46

## 2020-01-01 RX ADMIN — PROPOFOL 40 MCG/KG/MIN: 10 INJECTION, EMULSION INTRAVENOUS at 02:30

## 2020-01-01 RX ADMIN — METOPROLOL TARTRATE 5 MG: 5 INJECTION INTRAVENOUS at 08:41

## 2020-01-01 RX ADMIN — SODIUM BICARBONATE 150 MEQ: 84 INJECTION, SOLUTION INTRAVENOUS at 04:27

## 2020-01-01 RX ADMIN — BUDESONIDE 500 MCG: 0.5 INHALANT RESPIRATORY (INHALATION) at 20:21

## 2020-01-01 RX ADMIN — GUAIFENESIN 1200 MG: 600 TABLET ORAL at 11:11

## 2020-01-01 RX ADMIN — PHENYLEPHRINE HYDROCHLORIDE 100 MCG/MIN: 10 INJECTION INTRAVENOUS at 03:27

## 2020-01-01 RX ADMIN — Medication 220 MG: at 08:41

## 2020-01-01 RX ADMIN — DEXMEDETOMIDINE 1 MCG/KG/HR: 100 INJECTION, SOLUTION, CONCENTRATE INTRAVENOUS at 09:41

## 2020-01-01 RX ADMIN — HUMAN INSULIN 4 UNITS: 100 INJECTION, SOLUTION SUBCUTANEOUS at 12:21

## 2020-01-01 RX ADMIN — HUMAN INSULIN 2 UNITS: 100 INJECTION, SOLUTION SUBCUTANEOUS at 05:40

## 2020-01-01 RX ADMIN — MORPHINE SULFATE 2 MG: 2 INJECTION, SOLUTION INTRAMUSCULAR; INTRAVENOUS at 05:27

## 2020-01-01 RX ADMIN — LORAZEPAM 1 MG: 2 INJECTION INTRAMUSCULAR; INTRAVENOUS at 10:58

## 2020-01-01 RX ADMIN — ALBUTEROL SULFATE 2.5 MG: 2.5 SOLUTION RESPIRATORY (INHALATION) at 11:29

## 2020-01-01 RX ADMIN — HUMAN INSULIN 10 UNITS: 100 INJECTION, SOLUTION SUBCUTANEOUS at 20:16

## 2020-01-01 RX ADMIN — PROPOFOL 15 MCG/KG/MIN: 10 INJECTION, EMULSION INTRAVENOUS at 11:11

## 2020-01-01 RX ADMIN — Medication 30 ML: at 13:36

## 2020-01-01 RX ADMIN — Medication 10 ML: at 22:00

## 2020-01-01 RX ADMIN — FENTANYL CITRATE 25 MCG: 50 INJECTION, SOLUTION INTRAMUSCULAR; INTRAVENOUS at 11:04

## 2020-01-01 RX ADMIN — HUMAN INSULIN 2 UNITS: 100 INJECTION, SOLUTION SUBCUTANEOUS at 17:56

## 2020-01-01 RX ADMIN — PANTOPRAZOLE SODIUM 40 MG: 40 INJECTION, POWDER, FOR SOLUTION INTRAVENOUS at 12:50

## 2020-01-01 RX ADMIN — Medication 10 ML: at 21:07

## 2020-01-01 RX ADMIN — Medication 10 ML: at 14:00

## 2020-01-01 RX ADMIN — DEXTROSE MONOHYDRATE 75 ML/HR: 5 INJECTION, SOLUTION INTRAVENOUS at 02:16

## 2020-01-01 RX ADMIN — HUMAN INSULIN 4 UNITS: 100 INJECTION, SOLUTION SUBCUTANEOUS at 00:00

## 2020-01-01 RX ADMIN — ATORVASTATIN CALCIUM 40 MG: 40 TABLET, FILM COATED ORAL at 21:57

## 2020-01-01 RX ADMIN — NOREPINEPHRINE BITARTRATE 12 MCG/MIN: 1 INJECTION, SOLUTION, CONCENTRATE INTRAVENOUS at 12:38

## 2020-01-01 RX ADMIN — ASCORBIC ACID 1.5 G: 500 INJECTION INTRAVENOUS at 09:19

## 2020-01-01 RX ADMIN — PROPRANOLOL HYDROCHLORIDE 120 MG: 60 CAPSULE, EXTENDED RELEASE ORAL at 01:15

## 2020-01-01 RX ADMIN — HUMAN INSULIN 2 UNITS: 100 INJECTION, SOLUTION SUBCUTANEOUS at 12:04

## 2020-01-01 RX ADMIN — MIDAZOLAM HYDROCHLORIDE 9 MG/HR: 5 INJECTION, SOLUTION INTRAMUSCULAR; INTRAVENOUS at 02:15

## 2020-01-01 RX ADMIN — HUMAN INSULIN 4 UNITS: 100 INJECTION, SOLUTION SUBCUTANEOUS at 11:50

## 2020-01-01 RX ADMIN — GUAIFENESIN 1200 MG: 600 TABLET ORAL at 17:18

## 2020-01-01 RX ADMIN — IPRATROPIUM BROMIDE AND ALBUTEROL SULFATE 3 ML: .5; 3 SOLUTION RESPIRATORY (INHALATION) at 00:00

## 2020-01-01 RX ADMIN — DEXAMETHASONE 4 MG: 4 TABLET ORAL at 20:15

## 2020-01-01 RX ADMIN — DEXAMETHASONE SODIUM PHOSPHATE 6 MG: 4 INJECTION, SOLUTION INTRAMUSCULAR; INTRAVENOUS at 08:35

## 2020-01-01 RX ADMIN — PANTOPRAZOLE SODIUM 40 MG: 40 INJECTION, POWDER, FOR SOLUTION INTRAVENOUS at 13:07

## 2020-01-01 RX ADMIN — VENLAFAXINE 75 MG: 75 TABLET ORAL at 17:28

## 2020-01-01 RX ADMIN — PROPOFOL 20 MCG/KG/MIN: 10 INJECTION, EMULSION INTRAVENOUS at 04:10

## 2020-01-01 RX ADMIN — PHENYLEPHRINE HYDROCHLORIDE 300 MCG/MIN: 10 INJECTION INTRAVENOUS at 12:03

## 2020-01-01 RX ADMIN — ENOXAPARIN SODIUM 40 MG: 40 INJECTION SUBCUTANEOUS at 07:49

## 2020-01-01 RX ADMIN — IPRATROPIUM BROMIDE AND ALBUTEROL SULFATE 3 ML: .5; 3 SOLUTION RESPIRATORY (INHALATION) at 23:33

## 2020-01-01 RX ADMIN — POTASSIUM CHLORIDE 20 MEQ: 200 INJECTION, SOLUTION INTRAVENOUS at 03:26

## 2020-01-01 RX ADMIN — PROPOFOL 50 MCG/KG/MIN: 10 INJECTION, EMULSION INTRAVENOUS at 02:16

## 2020-01-01 RX ADMIN — POTASSIUM CHLORIDE 20 MEQ: 200 INJECTION, SOLUTION INTRAVENOUS at 05:23

## 2020-01-01 RX ADMIN — PANTOPRAZOLE SODIUM 40 MG: 40 INJECTION, POWDER, FOR SOLUTION INTRAVENOUS at 13:31

## 2020-01-01 RX ADMIN — FENTANYL CITRATE 100 MCG: 50 INJECTION, SOLUTION INTRAMUSCULAR; INTRAVENOUS at 16:38

## 2020-01-01 RX ADMIN — BUDESONIDE 500 MCG: 0.5 INHALANT RESPIRATORY (INHALATION) at 09:16

## 2020-01-01 RX ADMIN — SODIUM CHLORIDE 5 MCG/KG/MIN: 900 INJECTION, SOLUTION INTRAVENOUS at 09:12

## 2020-01-01 RX ADMIN — SODIUM POLYSTYRENE SULFONATE 15 G: 15 SUSPENSION ORAL; RECTAL at 17:42

## 2020-01-01 RX ADMIN — PROPRANOLOL HYDROCHLORIDE 120 MG: 60 CAPSULE, EXTENDED RELEASE ORAL at 21:06

## 2020-01-01 RX ADMIN — NOREPINEPHRINE BITARTRATE 30 MCG/MIN: 1 INJECTION, SOLUTION, CONCENTRATE INTRAVENOUS at 10:09

## 2020-01-01 RX ADMIN — FENTANYL CITRATE 50 MCG: 50 INJECTION INTRAMUSCULAR; INTRAVENOUS at 13:42

## 2020-01-01 RX ADMIN — ALBUTEROL SULFATE 2.5 MG: 2.5 SOLUTION RESPIRATORY (INHALATION) at 07:20

## 2020-01-01 RX ADMIN — MEROPENEM 500 MG: 500 INJECTION, POWDER, FOR SOLUTION INTRAVENOUS at 21:49

## 2020-01-01 RX ADMIN — DEXAMETHASONE 2 MG: 4 TABLET ORAL at 10:33

## 2020-01-01 RX ADMIN — DEXAMETHASONE SODIUM PHOSPHATE 6 MG: 4 INJECTION, SOLUTION INTRAMUSCULAR; INTRAVENOUS at 10:18

## 2020-01-01 RX ADMIN — HYDROCODONE BITARTRATE AND ACETAMINOPHEN 1 TABLET: 5; 325 TABLET ORAL at 13:04

## 2020-01-01 RX ADMIN — PROPOFOL 15 MCG/KG/MIN: 10 INJECTION, EMULSION INTRAVENOUS at 03:39

## 2020-01-01 RX ADMIN — PROPOFOL 40 MCG/KG/MIN: 10 INJECTION, EMULSION INTRAVENOUS at 10:30

## 2020-01-01 RX ADMIN — SODIUM CHLORIDE 100 ML/HR: 900 INJECTION, SOLUTION INTRAVENOUS at 00:00

## 2020-01-01 RX ADMIN — ALBUTEROL SULFATE 2 PUFF: 108 INHALANT RESPIRATORY (INHALATION) at 14:56

## 2020-01-01 RX ADMIN — Medication 10 ML: at 21:58

## 2020-01-01 RX ADMIN — POTASSIUM CHLORIDE 20 MEQ: 20 TABLET, EXTENDED RELEASE ORAL at 09:13

## 2020-01-01 RX ADMIN — DEXAMETHASONE SODIUM PHOSPHATE 6 MG: 4 INJECTION, SOLUTION INTRAMUSCULAR; INTRAVENOUS at 09:00

## 2020-01-01 RX ADMIN — ACETAMINOPHEN 650 MG: 325 TABLET, FILM COATED ORAL at 17:56

## 2020-01-01 RX ADMIN — CALCIUM GLUCONATE 1 G: 98 INJECTION, SOLUTION INTRAVENOUS at 02:13

## 2020-01-01 RX ADMIN — ALBUTEROL SULFATE 2.5 MG: 2.5 SOLUTION RESPIRATORY (INHALATION) at 20:14

## 2020-01-01 RX ADMIN — PROPOFOL 50 MCG/KG/MIN: 10 INJECTION, EMULSION INTRAVENOUS at 23:10

## 2020-01-01 RX ADMIN — HUMAN INSULIN 4 UNITS: 100 INJECTION, SOLUTION SUBCUTANEOUS at 17:31

## 2020-01-01 RX ADMIN — FENTANYL CITRATE 50 MCG: 50 INJECTION INTRAMUSCULAR; INTRAVENOUS at 15:47

## 2020-01-01 RX ADMIN — FENTANYL CITRATE 100 MCG: 50 INJECTION, SOLUTION INTRAMUSCULAR; INTRAVENOUS at 12:07

## 2020-01-01 RX ADMIN — NOREPINEPHRINE BITARTRATE 14 MCG/MIN: 1 INJECTION, SOLUTION, CONCENTRATE INTRAVENOUS at 21:50

## 2020-01-01 RX ADMIN — Medication 20 ML: at 22:00

## 2020-01-01 RX ADMIN — PANTOPRAZOLE SODIUM 40 MG: 40 TABLET, DELAYED RELEASE ORAL at 07:48

## 2020-01-01 RX ADMIN — SODIUM CHLORIDE 4 MCG/KG/MIN: 900 INJECTION, SOLUTION INTRAVENOUS at 16:33

## 2020-01-01 RX ADMIN — ALBUTEROL SULFATE 2.5 MG: 2.5 SOLUTION RESPIRATORY (INHALATION) at 09:16

## 2020-01-01 RX ADMIN — LORAZEPAM 2 MG: 2 INJECTION INTRAMUSCULAR; INTRAVENOUS at 07:25

## 2020-01-01 RX ADMIN — METOPROLOL TARTRATE 5 MG: 5 INJECTION INTRAVENOUS at 00:43

## 2020-01-01 RX ADMIN — DEXAMETHASONE SODIUM PHOSPHATE 6 MG: 10 INJECTION INTRAMUSCULAR; INTRAVENOUS at 18:09

## 2020-01-01 RX ADMIN — METOPROLOL TARTRATE 5 MG: 5 INJECTION INTRAVENOUS at 23:52

## 2020-01-01 RX ADMIN — Medication 10 ML: at 21:41

## 2020-01-01 RX ADMIN — NOREPINEPHRINE BITARTRATE 18 MCG/MIN: 1 INJECTION, SOLUTION, CONCENTRATE INTRAVENOUS at 16:13

## 2020-01-01 RX ADMIN — SODIUM CHLORIDE 4 MG/HR: 900 INJECTION, SOLUTION INTRAVENOUS at 16:51

## 2020-01-01 RX ADMIN — OXYCODONE HYDROCHLORIDE AND ACETAMINOPHEN 1000 MG: 500 TABLET ORAL at 08:08

## 2020-01-01 RX ADMIN — BUDESONIDE 500 MCG: 0.5 INHALANT RESPIRATORY (INHALATION) at 07:27

## 2020-01-01 RX ADMIN — Medication 30 ML: at 05:11

## 2020-01-01 RX ADMIN — IPRATROPIUM BROMIDE AND ALBUTEROL SULFATE 3 ML: .5; 3 SOLUTION RESPIRATORY (INHALATION) at 05:11

## 2020-01-01 RX ADMIN — GUAIFENESIN 1200 MG: 600 TABLET ORAL at 07:50

## 2020-01-01 RX ADMIN — NOREPINEPHRINE BITARTRATE 30 MCG/MIN: 1 INJECTION, SOLUTION, CONCENTRATE INTRAVENOUS at 03:15

## 2020-01-01 RX ADMIN — POLYETHYLENE GLYCOL 3350 17 G: 17 POWDER, FOR SOLUTION ORAL at 08:11

## 2020-01-01 RX ADMIN — PROPOFOL 20 MCG/KG/MIN: 10 INJECTION, EMULSION INTRAVENOUS at 07:20

## 2020-01-01 RX ADMIN — SODIUM CHLORIDE 1000 ML: 900 INJECTION, SOLUTION INTRAVENOUS at 12:40

## 2020-01-01 RX ADMIN — OXYCODONE HYDROCHLORIDE AND ACETAMINOPHEN 250 MG: 500 TABLET ORAL at 17:19

## 2020-01-01 RX ADMIN — Medication 30 ML: at 06:25

## 2020-01-01 RX ADMIN — OXYCODONE HYDROCHLORIDE AND ACETAMINOPHEN 1000 MG: 500 TABLET ORAL at 09:00

## 2020-01-01 RX ADMIN — ALBUTEROL SULFATE 2.5 MG: 2.5 SOLUTION RESPIRATORY (INHALATION) at 08:07

## 2020-01-01 RX ADMIN — PROPOFOL 50 MCG/KG/MIN: 10 INJECTION, EMULSION INTRAVENOUS at 20:10

## 2020-01-01 RX ADMIN — MIDAZOLAM HYDROCHLORIDE 6 MG/HR: 5 INJECTION, SOLUTION INTRAMUSCULAR; INTRAVENOUS at 19:45

## 2020-01-01 RX ADMIN — PROPOFOL 25 MCG/KG/MIN: 10 INJECTION, EMULSION INTRAVENOUS at 07:52

## 2020-01-01 RX ADMIN — MEROPENEM 500 MG: 500 INJECTION, POWDER, FOR SOLUTION INTRAVENOUS at 04:25

## 2020-01-01 RX ADMIN — PROPOFOL 50 MCG/KG/MIN: 10 INJECTION, EMULSION INTRAVENOUS at 13:00

## 2020-01-01 RX ADMIN — BUDESONIDE AND FORMOTEROL FUMARATE DIHYDRATE: 80; 4.5 AEROSOL RESPIRATORY (INHALATION) at 07:21

## 2020-01-01 RX ADMIN — LORAZEPAM 2 MG: 2 INJECTION INTRAMUSCULAR; INTRAVENOUS at 03:52

## 2020-01-01 RX ADMIN — LANSOPRAZOLE 30 MG: KIT at 09:00

## 2020-01-01 RX ADMIN — MIDAZOLAM 5 MG: 1 INJECTION INTRAMUSCULAR; INTRAVENOUS at 16:41

## 2020-01-01 RX ADMIN — HUMAN INSULIN 2 UNITS: 100 INJECTION, SOLUTION SUBCUTANEOUS at 18:00

## 2020-01-01 RX ADMIN — PROPOFOL 40 MCG/KG/MIN: 10 INJECTION, EMULSION INTRAVENOUS at 14:31

## 2020-01-01 RX ADMIN — SODIUM CHLORIDE 6 MCG/KG/MIN: 900 INJECTION, SOLUTION INTRAVENOUS at 09:53

## 2020-01-01 RX ADMIN — IPRATROPIUM BROMIDE AND ALBUTEROL SULFATE 3 ML: .5; 3 SOLUTION RESPIRATORY (INHALATION) at 04:05

## 2020-01-01 RX ADMIN — PANTOPRAZOLE SODIUM 40 MG: 40 TABLET, DELAYED RELEASE ORAL at 09:13

## 2020-01-01 RX ADMIN — ALBUTEROL SULFATE 2 PUFF: 108 INHALANT RESPIRATORY (INHALATION) at 07:53

## 2020-01-01 RX ADMIN — METOPROLOL TARTRATE 5 MG: 5 INJECTION INTRAVENOUS at 04:05

## 2020-01-01 RX ADMIN — ALBUTEROL SULFATE 2.5 MG: 2.5 SOLUTION RESPIRATORY (INHALATION) at 16:08

## 2020-01-01 RX ADMIN — LORAZEPAM 1 MG: 2 INJECTION INTRAMUSCULAR; INTRAVENOUS at 12:00

## 2020-01-01 RX ADMIN — VENLAFAXINE 75 MG: 75 TABLET ORAL at 17:45

## 2020-01-01 RX ADMIN — PROPOFOL 50 MCG/KG/MIN: 10 INJECTION, EMULSION INTRAVENOUS at 15:16

## 2020-01-01 RX ADMIN — Medication 10 ML: at 06:25

## 2020-01-01 RX ADMIN — Medication 5 ML: at 17:21

## 2020-01-01 RX ADMIN — HUMAN INSULIN 2 UNITS: 100 INJECTION, SOLUTION SUBCUTANEOUS at 00:00

## 2020-01-01 RX ADMIN — Medication 30 ML: at 21:02

## 2020-01-01 RX ADMIN — ENOXAPARIN SODIUM 40 MG: 40 INJECTION SUBCUTANEOUS at 08:41

## 2020-01-01 RX ADMIN — METOPROLOL TARTRATE 5 MG: 5 INJECTION INTRAVENOUS at 15:16

## 2020-01-01 RX ADMIN — CALCIUM GLUCONATE: 98 INJECTION, SOLUTION INTRAVENOUS at 17:59

## 2020-01-01 RX ADMIN — OXYCODONE HYDROCHLORIDE AND ACETAMINOPHEN 1000 MG: 500 TABLET ORAL at 17:28

## 2020-01-01 RX ADMIN — POLYETHYLENE GLYCOL 3350 17 G: 17 POWDER, FOR SOLUTION ORAL at 09:00

## 2020-01-01 RX ADMIN — DOXYCYCLINE 100 MG: 100 INJECTION, POWDER, LYOPHILIZED, FOR SOLUTION INTRAVENOUS at 08:35

## 2020-01-01 RX ADMIN — LORAZEPAM 2 MG: 2 INJECTION INTRAMUSCULAR; INTRAVENOUS at 15:25

## 2020-01-01 RX ADMIN — ENOXAPARIN SODIUM 40 MG: 40 INJECTION SUBCUTANEOUS at 21:06

## 2020-01-01 RX ADMIN — ALBUTEROL SULFATE 2.5 MG: 2.5 SOLUTION RESPIRATORY (INHALATION) at 19:56

## 2020-01-01 RX ADMIN — PANTOPRAZOLE SODIUM 40 MG: 40 INJECTION, POWDER, FOR SOLUTION INTRAVENOUS at 14:30

## 2020-01-01 RX ADMIN — ASCORBIC ACID 1.5 G: 500 INJECTION INTRAVENOUS at 18:01

## 2020-01-01 RX ADMIN — VASOPRESSIN 0.1 UNITS/MIN: 20 INJECTION INTRAVENOUS at 12:03

## 2020-01-01 RX ADMIN — Medication 220 MG: at 08:08

## 2020-01-01 RX ADMIN — DOXYCYCLINE 100 MG: 100 INJECTION, POWDER, LYOPHILIZED, FOR SOLUTION INTRAVENOUS at 20:56

## 2020-01-01 RX ADMIN — ASCORBIC ACID 1.5 G: 500 INJECTION INTRAVENOUS at 08:48

## 2020-01-01 RX ADMIN — OXYCODONE HYDROCHLORIDE AND ACETAMINOPHEN 1000 MG: 500 TABLET ORAL at 10:18

## 2020-01-01 RX ADMIN — VENLAFAXINE 75 MG: 75 TABLET ORAL at 17:41

## 2020-01-01 RX ADMIN — GUAIFENESIN 1200 MG: 600 TABLET ORAL at 09:13

## 2020-01-01 RX ADMIN — Medication 10 ML: at 05:58

## 2020-01-01 RX ADMIN — Medication 30 ML: at 15:22

## 2020-01-01 RX ADMIN — PROPOFOL 0 MCG/KG/MIN: 10 INJECTION, EMULSION INTRAVENOUS at 05:43

## 2020-01-01 RX ADMIN — Medication 10 ML: at 05:04

## 2020-01-01 RX ADMIN — SODIUM BICARBONATE 100 MEQ: 84 INJECTION, SOLUTION INTRAVENOUS at 06:38

## 2020-01-01 RX ADMIN — MIDAZOLAM HYDROCHLORIDE 6 MG/HR: 5 INJECTION, SOLUTION INTRAMUSCULAR; INTRAVENOUS at 06:23

## 2020-01-01 RX ADMIN — METOPROLOL TARTRATE 5 MG: 5 INJECTION INTRAVENOUS at 08:08

## 2020-01-01 RX ADMIN — OXYCODONE HYDROCHLORIDE AND ACETAMINOPHEN 1000 MG: 500 TABLET ORAL at 08:22

## 2020-01-01 RX ADMIN — Medication 30 ML: at 22:02

## 2020-01-01 RX ADMIN — DEXMEDETOMIDINE 1.2 MCG/KG/HR: 100 INJECTION, SOLUTION, CONCENTRATE INTRAVENOUS at 15:53

## 2020-01-01 RX ADMIN — Medication 30 ML: at 21:07

## 2020-01-01 RX ADMIN — VENLAFAXINE HYDROCHLORIDE 150 MG: 150 CAPSULE, EXTENDED RELEASE ORAL at 07:31

## 2020-01-01 RX ADMIN — DEXAMETHASONE SODIUM PHOSPHATE 6 MG: 4 INJECTION, SOLUTION INTRAMUSCULAR; INTRAVENOUS at 08:08

## 2020-01-01 RX ADMIN — INSULIN HUMAN 10 UNITS: 100 INJECTION, SOLUTION PARENTERAL at 02:12

## 2020-01-01 RX ADMIN — PHENYLEPHRINE HYDROCHLORIDE 300 MCG/MIN: 10 INJECTION INTRAVENOUS at 08:20

## 2020-01-01 RX ADMIN — Medication 175 MCG/HR: at 22:00

## 2020-01-01 RX ADMIN — ESMOLOL HYDROCHLORIDE 50 MCG/KG/MIN: 10 INJECTION INTRAVENOUS at 10:44

## 2020-01-01 RX ADMIN — ENOXAPARIN SODIUM 40 MG: 40 INJECTION SUBCUTANEOUS at 08:35

## 2020-01-01 RX ADMIN — LORAZEPAM 2 MG: 2 INJECTION INTRAMUSCULAR; INTRAVENOUS at 11:04

## 2020-01-01 RX ADMIN — LORAZEPAM 0.5 MG: 0.5 TABLET ORAL at 07:11

## 2020-01-01 RX ADMIN — PROPOFOL 50 MCG/KG/MIN: 10 INJECTION, EMULSION INTRAVENOUS at 17:13

## 2020-01-01 RX ADMIN — OXYCODONE HYDROCHLORIDE AND ACETAMINOPHEN 1000 MG: 500 TABLET ORAL at 08:41

## 2020-01-01 RX ADMIN — BUDESONIDE 500 MCG: 0.5 INHALANT RESPIRATORY (INHALATION) at 20:24

## 2020-01-01 RX ADMIN — HUMAN INSULIN 4 UNITS: 100 INJECTION, SOLUTION SUBCUTANEOUS at 00:44

## 2020-01-01 RX ADMIN — EPINEPHRINE 1 MCG/MIN: 1 INJECTION INTRAMUSCULAR; INTRAVENOUS; SUBCUTANEOUS at 10:03

## 2020-01-01 RX ADMIN — PROPOFOL 35 MCG/KG/MIN: 10 INJECTION, EMULSION INTRAVENOUS at 02:13

## 2020-01-01 RX ADMIN — IPRATROPIUM BROMIDE AND ALBUTEROL SULFATE 3 ML: .5; 3 SOLUTION RESPIRATORY (INHALATION) at 07:46

## 2020-01-01 RX ADMIN — DEXTROSE MONOHYDRATE 25 G: 25 INJECTION, SOLUTION INTRAVENOUS at 20:15

## 2020-01-01 RX ADMIN — DEXAMETHASONE SODIUM PHOSPHATE 6 MG: 4 INJECTION, SOLUTION INTRAMUSCULAR; INTRAVENOUS at 08:22

## 2020-01-01 RX ADMIN — HUMAN INSULIN 4 UNITS: 100 INJECTION, SOLUTION SUBCUTANEOUS at 17:43

## 2020-01-01 RX ADMIN — HUMAN INSULIN 4 UNITS: 100 INJECTION, SOLUTION SUBCUTANEOUS at 17:24

## 2020-01-01 RX ADMIN — HYDROCODONE BITARTRATE AND ACETAMINOPHEN 1 TABLET: 7.5; 325 TABLET ORAL at 03:26

## 2020-01-01 RX ADMIN — IPRATROPIUM BROMIDE AND ALBUTEROL SULFATE 3 ML: .5; 3 SOLUTION RESPIRATORY (INHALATION) at 07:27

## 2020-01-01 RX ADMIN — ACETAMINOPHEN 650 MG: 325 TABLET, FILM COATED ORAL at 23:52

## 2020-01-01 RX ADMIN — PROPOFOL 40 MCG/KG/MIN: 10 INJECTION, EMULSION INTRAVENOUS at 21:47

## 2020-01-01 RX ADMIN — DOXYCYCLINE HYCLATE 100 MG: 100 CAPSULE ORAL at 20:15

## 2020-01-01 RX ADMIN — POTASSIUM CHLORIDE 20 MEQ: 200 INJECTION, SOLUTION INTRAVENOUS at 09:00

## 2020-01-01 RX ADMIN — OXYCODONE HYDROCHLORIDE AND ACETAMINOPHEN 1000 MG: 500 TABLET ORAL at 17:41

## 2020-01-01 RX ADMIN — MIDAZOLAM HYDROCHLORIDE 6 MG/HR: 5 INJECTION, SOLUTION INTRAMUSCULAR; INTRAVENOUS at 07:52

## 2020-01-01 RX ADMIN — SODIUM ZIRCONIUM CYCLOSILICATE 10 G: 5 POWDER, FOR SUSPENSION ORAL at 20:16

## 2020-01-01 RX ADMIN — ENOXAPARIN SODIUM 40 MG: 40 INJECTION SUBCUTANEOUS at 09:13

## 2020-01-01 RX ADMIN — LORAZEPAM 2 MG: 2 INJECTION INTRAMUSCULAR; INTRAVENOUS at 15:43

## 2020-01-01 RX ADMIN — BUDESONIDE 500 MCG: 0.5 INHALANT RESPIRATORY (INHALATION) at 07:20

## 2020-01-01 RX ADMIN — FUROSEMIDE 40 MG: 10 INJECTION, SOLUTION INTRAMUSCULAR; INTRAVENOUS at 10:17

## 2020-01-01 RX ADMIN — OXYCODONE HYDROCHLORIDE AND ACETAMINOPHEN 250 MG: 500 TABLET ORAL at 17:18

## 2020-01-01 RX ADMIN — HUMAN INSULIN 2 UNITS: 100 INJECTION, SOLUTION SUBCUTANEOUS at 11:10

## 2020-01-01 RX ADMIN — ALBUTEROL SULFATE 2.5 MG: 2.5 SOLUTION RESPIRATORY (INHALATION) at 15:44

## 2020-01-01 RX ADMIN — IPRATROPIUM BROMIDE AND ALBUTEROL SULFATE 3 ML: .5; 3 SOLUTION RESPIRATORY (INHALATION) at 21:00

## 2020-01-01 RX ADMIN — FENTANYL CITRATE 100 MCG: 50 INJECTION, SOLUTION INTRAMUSCULAR; INTRAVENOUS at 16:55

## 2020-01-01 RX ADMIN — DEXAMETHASONE SODIUM PHOSPHATE 6 MG: 4 INJECTION, SOLUTION INTRAMUSCULAR; INTRAVENOUS at 08:34

## 2020-01-01 RX ADMIN — ATORVASTATIN CALCIUM 40 MG: 40 TABLET, FILM COATED ORAL at 21:39

## 2020-01-01 RX ADMIN — SENNOSIDES AND DOCUSATE SODIUM 1 TABLET: 8.6; 5 TABLET ORAL at 08:08

## 2020-01-01 RX ADMIN — CALCIUM GLUCONATE: 98 INJECTION, SOLUTION INTRAVENOUS at 20:08

## 2020-01-01 RX ADMIN — NOREPINEPHRINE BITARTRATE 26 MCG/MIN: 1 INJECTION, SOLUTION, CONCENTRATE INTRAVENOUS at 01:26

## 2020-01-01 RX ADMIN — ETOMIDATE 30 MG: 2 INJECTION INTRAVENOUS at 16:52

## 2020-01-01 RX ADMIN — Medication 10 ML: at 13:12

## 2020-01-01 RX ADMIN — ALBUTEROL SULFATE 2.5 MG: 2.5 SOLUTION RESPIRATORY (INHALATION) at 08:56

## 2020-01-01 RX ADMIN — SENNOSIDES AND DOCUSATE SODIUM 1 TABLET: 8.6; 5 TABLET ORAL at 10:18

## 2020-01-01 RX ADMIN — ENOXAPARIN SODIUM 40 MG: 40 INJECTION SUBCUTANEOUS at 21:53

## 2020-01-01 RX ADMIN — BUDESONIDE 500 MCG: 0.5 INHALANT RESPIRATORY (INHALATION) at 08:56

## 2020-01-01 RX ADMIN — DEXTROSE MONOHYDRATE 12.5 G: 25 INJECTION, SOLUTION INTRAVENOUS at 02:12

## 2020-01-01 RX ADMIN — OXYCODONE HYDROCHLORIDE AND ACETAMINOPHEN 250 MG: 500 TABLET ORAL at 09:14

## 2020-01-01 RX ADMIN — ENOXAPARIN SODIUM 40 MG: 40 INJECTION SUBCUTANEOUS at 22:04

## 2020-01-01 RX ADMIN — LINEZOLID 600 MG: 600 INJECTION, SOLUTION INTRAVENOUS at 04:25

## 2020-01-01 RX ADMIN — METOPROLOL TARTRATE 5 MG: 5 INJECTION INTRAVENOUS at 04:10

## 2020-01-01 RX ADMIN — MIDAZOLAM HYDROCHLORIDE 7 MG/HR: 5 INJECTION, SOLUTION INTRAMUSCULAR; INTRAVENOUS at 00:51

## 2020-01-01 RX ADMIN — ALBUTEROL SULFATE 2.5 MG: 2.5 SOLUTION RESPIRATORY (INHALATION) at 20:21

## 2020-01-01 RX ADMIN — Medication 10 ML: at 22:02

## 2020-01-01 RX ADMIN — METOPROLOL TARTRATE 5 MG: 5 INJECTION INTRAVENOUS at 12:50

## 2020-01-01 RX ADMIN — Medication 30 ML: at 21:58

## 2020-01-01 RX ADMIN — GUAIFENESIN 1200 MG: 600 TABLET ORAL at 17:19

## 2020-01-01 RX ADMIN — IPRATROPIUM BROMIDE AND ALBUTEROL SULFATE 3 ML: .5; 3 SOLUTION RESPIRATORY (INHALATION) at 11:41

## 2020-01-01 RX ADMIN — PROPOFOL 30 MCG/KG/MIN: 10 INJECTION, EMULSION INTRAVENOUS at 02:01

## 2020-01-01 RX ADMIN — INSULIN HUMAN 10 UNITS: 100 INJECTION, SOLUTION PARENTERAL at 05:14

## 2020-01-01 RX ADMIN — ASCORBIC ACID 1.5 G: 500 INJECTION INTRAVENOUS at 18:28

## 2020-01-01 RX ADMIN — Medication 30 ML: at 23:26

## 2020-01-01 RX ADMIN — Medication 100 MCG/HR: at 23:00

## 2020-01-01 RX ADMIN — ACETAMINOPHEN 650 MG: 325 TABLET, FILM COATED ORAL at 04:10

## 2020-01-01 RX ADMIN — PHENYLEPHRINE HYDROCHLORIDE 300 MCG/MIN: 10 INJECTION INTRAVENOUS at 05:39

## 2020-01-01 RX ADMIN — Medication 30 ML: at 05:22

## 2020-01-01 RX ADMIN — Medication 150 MCG/HR: at 01:30

## 2020-01-01 RX ADMIN — ETOMIDATE 30 MG: 2 INJECTION, SOLUTION INTRAVENOUS at 16:52

## 2020-01-01 RX ADMIN — DOXYCYCLINE 100 MG: 100 INJECTION, POWDER, LYOPHILIZED, FOR SOLUTION INTRAVENOUS at 23:25

## 2020-01-01 RX ADMIN — METOPROLOL TARTRATE 5 MG: 5 INJECTION INTRAVENOUS at 23:58

## 2020-01-01 RX ADMIN — MINERAL OIL AND WHITE PETROLATUM: 150; 830 OINTMENT OPHTHALMIC at 11:46

## 2020-01-01 RX ADMIN — Medication 10 ML: at 05:37

## 2020-01-01 RX ADMIN — DEXMEDETOMIDINE 0.6 MCG/KG/HR: 100 INJECTION, SOLUTION, CONCENTRATE INTRAVENOUS at 22:03

## 2020-01-01 RX ADMIN — Medication 10 ML: at 05:05

## 2020-01-01 RX ADMIN — DEXAMETHASONE SODIUM PHOSPHATE 6 MG: 4 INJECTION, SOLUTION INTRAMUSCULAR; INTRAVENOUS at 15:21

## 2020-01-01 RX ADMIN — ALBUTEROL SULFATE 2.5 MG: 2.5 SOLUTION RESPIRATORY (INHALATION) at 11:41

## 2020-01-01 RX ADMIN — VASOPRESSIN 0.03 UNITS/MIN: 20 INJECTION INTRAVENOUS at 05:15

## 2020-01-01 RX ADMIN — IPRATROPIUM BROMIDE AND ALBUTEROL SULFATE 3 ML: .5; 3 SOLUTION RESPIRATORY (INHALATION) at 07:36

## 2020-01-01 RX ADMIN — DEXTROSE MONOHYDRATE 25 G: 25 INJECTION, SOLUTION INTRAVENOUS at 05:14

## 2020-01-01 RX ADMIN — VENLAFAXINE HYDROCHLORIDE 150 MG: 150 CAPSULE, EXTENDED RELEASE ORAL at 07:51

## 2020-01-01 RX ADMIN — Medication 150 MCG/HR: at 04:45

## 2020-01-01 RX ADMIN — METOPROLOL TARTRATE 5 MG: 5 INJECTION INTRAVENOUS at 20:09

## 2020-01-01 RX ADMIN — SODIUM CHLORIDE 5 MG/HR: 900 INJECTION, SOLUTION INTRAVENOUS at 09:22

## 2020-01-01 RX ADMIN — IPRATROPIUM BROMIDE AND ALBUTEROL SULFATE 3 ML: .5; 3 SOLUTION RESPIRATORY (INHALATION) at 17:36

## 2020-01-01 RX ADMIN — SODIUM CHLORIDE 5 MCG/KG/MIN: 900 INJECTION, SOLUTION INTRAVENOUS at 11:44

## 2020-01-01 RX ADMIN — DEXTROSE MONOHYDRATE 75 ML/HR: 5 INJECTION, SOLUTION INTRAVENOUS at 16:03

## 2020-01-01 RX ADMIN — MIDAZOLAM HYDROCHLORIDE 5 MG: 1 INJECTION, SOLUTION INTRAMUSCULAR; INTRAVENOUS at 16:41

## 2020-01-01 RX ADMIN — PROPOFOL 50 MCG/KG/MIN: 10 INJECTION, EMULSION INTRAVENOUS at 08:48

## 2020-01-01 RX ADMIN — VENLAFAXINE HYDROCHLORIDE 150 MG: 150 CAPSULE, EXTENDED RELEASE ORAL at 09:13

## 2020-01-01 RX ADMIN — METOPROLOL TARTRATE 5 MG: 5 INJECTION INTRAVENOUS at 23:56

## 2020-01-01 RX ADMIN — Medication 220 MG: at 10:18

## 2020-01-01 RX ADMIN — ENOXAPARIN SODIUM 40 MG: 40 INJECTION SUBCUTANEOUS at 21:59

## 2020-01-01 RX ADMIN — BUDESONIDE 500 MCG: 0.5 INHALANT RESPIRATORY (INHALATION) at 07:46

## 2020-01-01 RX ADMIN — METOPROLOL TARTRATE 5 MG: 5 INJECTION INTRAVENOUS at 11:46

## 2020-01-01 RX ADMIN — PROPOFOL 20 MCG/KG/MIN: 10 INJECTION, EMULSION INTRAVENOUS at 07:30

## 2020-01-01 RX ADMIN — ATORVASTATIN CALCIUM 40 MG: 40 TABLET, FILM COATED ORAL at 21:05

## 2020-01-01 RX ADMIN — OXYCODONE HYDROCHLORIDE AND ACETAMINOPHEN 250 MG: 500 TABLET ORAL at 08:09

## 2020-01-01 RX ADMIN — ALBUTEROL SULFATE 2.5 MG: 2.5 SOLUTION RESPIRATORY (INHALATION) at 15:02

## 2020-01-01 RX ADMIN — SODIUM BICARBONATE: 84 INJECTION, SOLUTION INTRAVENOUS at 05:16

## 2020-01-01 RX ADMIN — METOPROLOL TARTRATE 5 MG: 5 INJECTION INTRAVENOUS at 07:35

## 2020-01-01 RX ADMIN — PROPOFOL 35 MCG/KG/MIN: 10 INJECTION, EMULSION INTRAVENOUS at 10:17

## 2020-01-01 RX ADMIN — HUMAN INSULIN 2 UNITS: 100 INJECTION, SOLUTION SUBCUTANEOUS at 00:43

## 2020-01-01 RX ADMIN — PHENYLEPHRINE HYDROCHLORIDE 300 MCG/MIN: 10 INJECTION INTRAVENOUS at 09:02

## 2020-01-01 RX ADMIN — Medication 175 MCG/HR: at 06:00

## 2020-01-01 RX ADMIN — PROPOFOL 20 MCG/KG/MIN: 10 INJECTION, EMULSION INTRAVENOUS at 17:56

## 2020-01-01 RX ADMIN — Medication 10 ML: at 05:11

## 2020-01-01 RX ADMIN — SODIUM CHLORIDE 6 MCG/KG/MIN: 900 INJECTION, SOLUTION INTRAVENOUS at 12:14

## 2020-01-01 RX ADMIN — ALBUTEROL SULFATE 2.5 MG: 2.5 SOLUTION RESPIRATORY (INHALATION) at 12:28

## 2020-01-01 RX ADMIN — BUDESONIDE 500 MCG: 0.5 INHALANT RESPIRATORY (INHALATION) at 19:56

## 2020-01-01 RX ADMIN — PROPOFOL 25 MCG/KG/MIN: 10 INJECTION, EMULSION INTRAVENOUS at 15:26

## 2020-01-01 RX ADMIN — BUDESONIDE 500 MCG: 0.5 INHALANT RESPIRATORY (INHALATION) at 08:07

## 2020-01-01 RX ADMIN — VENLAFAXINE 75 MG: 75 TABLET ORAL at 08:41

## 2020-01-01 RX ADMIN — DOXYCYCLINE 100 MG: 100 INJECTION, POWDER, LYOPHILIZED, FOR SOLUTION INTRAVENOUS at 08:45

## 2020-01-01 RX ADMIN — SENNOSIDES AND DOCUSATE SODIUM 1 TABLET: 8.6; 5 TABLET ORAL at 09:00

## 2020-01-01 RX ADMIN — Medication 220 MG: at 09:00

## 2020-01-01 RX ADMIN — LORAZEPAM 0.5 MG: 0.5 TABLET ORAL at 10:33

## 2020-01-01 RX ADMIN — DEXAMETHASONE 4 MG: 4 TABLET ORAL at 09:13

## 2020-01-01 RX ADMIN — PANTOPRAZOLE SODIUM 40 MG: 40 INJECTION, POWDER, FOR SOLUTION INTRAVENOUS at 15:20

## 2020-01-01 RX ADMIN — Medication 30 ML: at 05:37

## 2020-01-01 RX ADMIN — POTASSIUM BICARBONATE 20 MEQ: 782 TABLET, EFFERVESCENT ORAL at 10:00

## 2020-01-01 RX ADMIN — ALBUTEROL SULFATE 2.5 MG: 2.5 SOLUTION RESPIRATORY (INHALATION) at 07:30

## 2020-05-28 PROBLEM — M25.559 PAIN IN JOINT INVOLVING PELVIC REGION AND THIGH: Status: ACTIVE | Noted: 2020-01-01

## 2020-05-28 PROBLEM — M54.30 SCIATICA: Status: ACTIVE | Noted: 2020-01-01

## 2020-05-28 PROBLEM — K58.9 IRRITABLE BOWEL SYNDROME: Status: ACTIVE | Noted: 2020-01-01

## 2020-05-28 PROBLEM — M77.10 LATERAL EPICONDYLITIS: Status: ACTIVE | Noted: 2020-01-01

## 2020-05-28 PROBLEM — E55.9 VITAMIN D DEFICIENCY: Status: ACTIVE | Noted: 2020-01-01

## 2020-05-28 PROBLEM — M13.0 POLYARTHRITIS: Status: ACTIVE | Noted: 2020-01-01

## 2020-05-28 PROBLEM — M54.2 NECK PAIN: Status: ACTIVE | Noted: 2020-01-01

## 2020-05-28 PROBLEM — M16.10 PRIMARY LOCALIZED OSTEOARTHRITIS OF PELVIC REGION AND THIGH: Status: ACTIVE | Noted: 2020-01-01

## 2020-05-28 PROBLEM — G25.0 ESSENTIAL TREMOR: Status: ACTIVE | Noted: 2020-01-01

## 2020-05-28 PROBLEM — R79.89 ABNORMAL LIVER FUNCTION TESTS: Status: ACTIVE | Noted: 2020-01-01

## 2020-05-28 PROBLEM — L30.9 ECZEMA: Status: ACTIVE | Noted: 2020-01-01

## 2020-05-28 PROBLEM — E89.40 POSTABLATIVE OVARIAN FAILURE: Status: ACTIVE | Noted: 2020-01-01

## 2020-05-28 PROBLEM — R60.9 EDEMA: Status: ACTIVE | Noted: 2020-01-01

## 2020-05-28 PROBLEM — M85.80 OSTEOPENIA: Status: ACTIVE | Noted: 2020-01-01

## 2020-07-20 PROBLEM — M45.A0 NON-RADIOGRAPHIC AXIAL SPONDYLOARTHRITIS (HCC): Status: ACTIVE | Noted: 2020-01-01

## 2020-10-21 PROBLEM — K43.2 INCISIONAL HERNIA: Status: ACTIVE | Noted: 2020-01-01

## 2020-10-30 PROBLEM — U07.1 COVID-19: Status: ACTIVE | Noted: 2020-01-01

## 2020-10-30 PROBLEM — J96.01 ACUTE HYPOXEMIC RESPIRATORY FAILURE (HCC): Status: ACTIVE | Noted: 2020-01-01

## 2020-10-30 NOTE — ED PROVIDER NOTES
Patient presents the ER complaint of worsening shortness of breath and cough. Patient reports recent diagnosis of COVID-19 approximately 8 days ago. Reports worsens exertional dyspnea the past 2 nights. Denies fevers or chills. Reports extreme fatigue. Denies any vomiting or diarrhea The history is provided by the patient. Shortness of Breath This is a new problem. The problem occurs frequently. The current episode started 2 days ago. The problem has been gradually worsening. Associated symptoms include cough. Pertinent negatives include no headaches, no neck pain, no sputum production, no vomiting, no leg pain and no leg swelling. She has tried nothing for the symptoms. Past Medical History:  
Diagnosis Date  ADHD (attention deficit hyperactivity disorder)  Asthma MDI  Chronic fatigue  Chronic pain   
 generalized.  Cystitis   
 hx; fermin s/p catheter  Depression  Diabetes ([address]  Fibromyalgia  GERD (gastroesophageal reflux disease) 3/11/2015  H/O Clostridium difficile infection 12/14/2012  H/O seasonal allergies 12/14/2012  Heart palpitations   
 controlled with med  HLD (hyperlipidemia) 12/14/2012  
 HTN (hypertension) 12/14/2012  Hypokalemia 12/14/2012  Kidney stones   
 hx  Obesity, morbid (more than 100 lbs over ideal weight or BMI > 40) (HCC) BMI 48, HTN, need large BP cuff.  GLENYS (obstructive sleep apnea) 7/21/2015  Osteoarthritis   
 spine, hip  Persistent disorder of initiating or maintaining sleep 7/21/2015  Postablative ovarian failure 5/28/2020  PTSD (post-traumatic stress disorder)  RA (rheumatoid arthritis) ([address]  Recurrent UTI 12/14/2012  Unspecified adverse effect of anesthesia   
 had transient blindness s/p anesthesia in 2000; surgeries since with no problem Past Surgical History:  
Procedure Laterality Date [address]  HX CHOLECYSTECTOMY  2012  HX GASTRECTOMY  05/27/2015 Gastric Sleeve  HX HEENT [place] teeth removed [id number]  HX OOPHORECTOMY    
 large mass had the ovary removed  HX PARTIAL HYSTERECTOMY  2002  
 w/ unilateral oophorectomy  HX PELVIC LAPAROSCOPY    
 HX TONSILLECTOMY  US GUIDED CORE BREAST BIOPSY Left Family History:  
Problem Relation Age of Onset  Diabetes Mother  Heart Disease Mother  Hypertension Mother  Diabetes Father  Heart Disease Father  Heart Failure Father  Hypertension Father  Kidney Disease Sister  Breast Cancer Maternal Grandmother 77  Hypertension Maternal Grandmother  Hypertension Paternal Grandmother Social History Socioeconomic History  Marital status:  Spouse name: halina  Number of children: 3  
 Years of education: Not on file  Highest education level: Not on file Occupational History Comment: prior respiratory therapist  
Social Needs  Financial resource strain: Not on file  Food insecurity Worry: Not on file Inability: Not on file  Transportation needs Medical: Not on file Non-medical: Not on file Tobacco Use  Smoking status: Never Smoker  Smokeless tobacco: Never Used Substance and Sexual Activity  Alcohol use: Yes Comment: Wine with meals 1-2 x per month.  Drug use: No  
 Sexual activity: Yes  
  Partners: Male Lifestyle  Physical activity Days per week: Not on file Minutes per session: Not on file  Stress: Not on file Relationships  Social connections Talks on phone: Not on file Gets together: Not on file Attends Caodaism service: Not on file Active member of club or organization: Not on file Attends meetings of clubs or organizations: Not on file Relationship status: Not on file  Intimate partner violence Fear of current or ex partner: Not on file Emotionally abused: Not on file Physically abused: Not on file Forced sexual activity: Not on file Other Topics Concern  Not on file Social History Narrative  Not on file ALLERGIES: Codeine; Flagyl [metronidazole]; Keflex [cephalexin]; Levaquin [levofloxacin]; Lyrica [pregabalin]; Macrobid [nitrofurantoin monohyd/m-cryst]; Neurontin [gabapentin]; Other medication; Penicillins; Percocet [oxycodone-acetaminophen]; Robitussin a-c [codeine-guaifenesin]; Sulfa (sulfonamide antibiotics); Tetanus toxoid; Tetanus vaccines and toxoid; Tussin  [dextromethorphan hbr]; Tussin dm cough medicine; Vancomycin; Whey; and Xyzal [levocetirizine] Review of Systems Constitutional: Negative for fatigue and unexpected weight change. HENT: Negative for congestion. Eyes: Negative for photophobia, redness and visual disturbance. Respiratory: Positive for cough, chest tightness and shortness of breath. Negative for sputum production. Cardiovascular: Negative for leg swelling. Gastrointestinal: Negative for vomiting. Genitourinary: Negative for pelvic pain and urgency. Musculoskeletal: Negative for back pain, neck pain and neck stiffness. Skin: Negative for color change and pallor. Neurological: Negative for tremors, weakness and headaches. Hematological: Negative for adenopathy. Does not bruise/bleed easily. Psychiatric/Behavioral: Negative for behavioral problems and confusion. All other systems reviewed and are negative. Vitals:  
 10/30/20 1719 10/30/20 1724 10/30/20 1726 BP: 108/71 Pulse: 83 Resp: 24 Temp: 99.4 °F (37.4 °C) SpO2: (!) 81% (!) 85% 92% Weight: 108.4 kg (239 lb) Height: 5' 2\" (1.575 m) Physical Exam 
Vitals signs and nursing note reviewed. Constitutional:   
   Appearance: Normal appearance. HENT:  
   Head: Normocephalic and atraumatic.   
   Nose: Nose normal.  
   Mouth/Throat:  
   Mouth: Mucous membranes are moist.  
 Pharynx: No oropharyngeal exudate or posterior oropharyngeal erythema. Eyes:  
   Extraocular Movements: Extraocular movements intact. Conjunctiva/sclera: Conjunctivae normal.  
   Pupils: Pupils are equal, round, and reactive to light. Neck: Musculoskeletal: Normal range of motion and neck supple. No neck rigidity or muscular tenderness. Vascular: No carotid bruit. Cardiovascular:  
   Rate and Rhythm: Normal rate and regular rhythm. Pulses: Normal pulses. Heart sounds: Normal heart sounds. No murmur. No friction rub. Pulmonary:  
   Effort: Pulmonary effort is normal. No respiratory distress. Breath sounds: Rhonchi present. Chest:  
   Chest wall: No tenderness. Abdominal:  
   General: Abdomen is flat. Bowel sounds are normal. There is no distension. Palpations: Abdomen is soft. There is no mass. Tenderness: There is no abdominal tenderness. Hernia: No hernia is present. Musculoskeletal:     
   General: No tenderness. Right lower leg: No edema. Left lower leg: No edema. Skin: 
   General: Skin is warm and dry. Capillary Refill: Capillary refill takes less than 2 seconds. Coloration: Skin is not jaundiced or pale. Findings: No bruising. Neurological:  
   General: No focal deficit present. Mental Status: She is alert and oriented to person, place, and time. Mental status is at baseline. Cranial Nerves: No cranial nerve deficit. Sensory: No sensory deficit. Coordination: Coordination normal.  
Psychiatric:     
   Mood and Affect: Mood normal.  
 
  
 
MDM Number of Diagnoses or Management Options COVID-19 virus infection: Hypoxia:  
Diagnosis management comments: Concern for worsening COVID-19 infection, patient was hypoxic upon arrival here. Will start steroids, obtain chest x-ray. 7:27 PM 
Chest x-ray shows bilateral infiltrates consistent with COVID-19.   Normal basic labs. Patient on 6 L nasal cannula to maintain oxygen saturations. Will start IV Decadron and doxycycline. Will discuss case with hospitalist for admission Amount and/or Complexity of Data Reviewed Clinical lab tests: reviewed and ordered Tests in the radiology section of CPT®: ordered and reviewed Decide to obtain previous medical records or to obtain history from someone other than the patient: yes Review and summarize past medical records: yes Discuss the patient with other providers: yes Independent visualization of images, tracings, or specimens: yes Risk of Complications, Morbidity, and/or Mortality Presenting problems: high Diagnostic procedures: moderate Management options: high Patient Progress Patient progress: stable Procedures Results Include: 
 
Recent Results (from the past 24 hour(s)) CBC WITH AUTOMATED DIFF Collection Time: 10/30/20  5:25 PM  
Result Value Ref Range WBC 6.1 4.3 - 11.1 K/uL  
 RBC 4.37 4.05 - 5.2 M/uL  
 HGB 13.4 11.7 - 15.4 g/dL HCT 39.4 35.8 - 46.3 % MCV 90.2 79.6 - 97.8 FL  
 MCH 30.7 26.1 - 32.9 PG  
 MCHC 34.0 31.4 - 35.0 g/dL  
 RDW 13.9 11.9 - 14.6 % PLATELET 776 179 - 399 K/uL MPV 9.4 9.4 - 12.3 FL ABSOLUTE NRBC 0.00 0.0 - 0.2 K/uL  
 DF AUTOMATED NEUTROPHILS 73 43 - 78 % LYMPHOCYTES 16 13 - 44 % MONOCYTES 10 4.0 - 12.0 % EOSINOPHILS 0 (L) 0.5 - 7.8 % BASOPHILS 0 0.0 - 2.0 % IMMATURE GRANULOCYTES 0 0.0 - 5.0 %  
 ABS. NEUTROPHILS 4.4 1.7 - 8.2 K/UL  
 ABS. LYMPHOCYTES 1.0 0.5 - 4.6 K/UL  
 ABS. MONOCYTES 0.6 0.1 - 1.3 K/UL  
 ABS. EOSINOPHILS 0.0 0.0 - 0.8 K/UL  
 ABS. BASOPHILS 0.0 0.0 - 0.2 K/UL  
 ABS. IMM. GRANS. 0.0 0.0 - 0.5 K/UL METABOLIC PANEL, COMPREHENSIVE Collection Time: 10/30/20  5:25 PM  
Result Value Ref Range Sodium 135 (L) 136 - 145 mmol/L Potassium 3.0 (L) 3.5 - 5.1 mmol/L  Chloride 98 98 - 107 mmol/L  
 CO2 32 21 - 32 mmol/L  
 Anion gap 5 (L) 7 - 16 mmol/L Glucose 104 (H) 65 - 100 mg/dL BUN 8 6 - 23 MG/DL Creatinine 0.70 0.6 - 1.0 MG/DL  
 GFR est AA >60 >60 ml/min/1.73m2 GFR est non-AA >60 >60 ml/min/1.73m2 Calcium 8.0 (L) 8.3 - 10.4 MG/DL Bilirubin, total 0.7 0.2 - 1.1 MG/DL  
 ALT (SGPT) 25 12 - 65 U/L  
 AST (SGOT) 52 (H) 15 - 37 U/L Alk. phosphatase 113 50 - 136 U/L Protein, total 7.0 6.3 - 8.2 g/dL Albumin 2.7 (L) 3.5 - 5.0 g/dL Globulin 4.3 (H) 2.3 - 3.5 g/dL A-G Ratio 0.6 (L) 1.2 - 3.5 LACTIC ACID Collection Time: 10/30/20  5:25 PM  
Result Value Ref Range Lactic acid 1.5 0.4 - 2.0 MMOL/L  
C REACTIVE PROTEIN, QT Collection Time: 10/30/20  5:25 PM  
Result Value Ref Range C-Reactive protein 11.7 (H) 0.0 - 0.9 mg/dL FERRITIN Collection Time: 10/30/20  5:25 PM  
Result Value Ref Range Ferritin 348 8 - 388 NG/ML Voice dictation software was used during the making of this note. This software is not perfect and grammatical and other typographical errors may be present. This note has been proofread, but may still contain errors. Catherine Lazo MD; 10/30/2020 @7:27 PM  
=================================================================== I wore appropriate PPE throughout this patient's ED visit.  Catherine Lazo MD, 7:27 PM

## 2020-10-30 NOTE — ED TRIAGE NOTES
Pt ambulatory to triage without complications. Pt states she feels lightheaded x 8 days without fever. Pt positive for COVID last Thursday at health department at [place]. Pt states she feels SOB with cough. Pt states she is on cimzia for Psoriatic arthritits. Pt hypoxic. Pt states they have dropped at home. 83% in triage on RA.

## 2020-10-31 PROBLEM — U07.1 ACUTE HYPOXEMIC RESPIRATORY FAILURE DUE TO COVID-19 (HCC): Status: ACTIVE | Noted: 2020-01-01

## 2020-10-31 NOTE — PROGRESS NOTES
Pt arrived on floor, new admission from ED. Dual skin assessment performed. No pressure injuries noted.

## 2020-10-31 NOTE — ED NOTES
TRANSFER - OUT REPORT: 
 
Verbal report given to Renee Musa on Jose Maria Hoffman  being transferred to 8th floor for routine progression of care Report consisted of patients Situation, Background, Assessment and  
Recommendations(SBAR). Information from the following report(s) SBAR, ED Summary and Recent Results was reviewed with the receiving nurse. Lines:  
Peripheral IV 10/30/20 Left Antecubital (Active) Opportunity for questions and clarification was provided. Patient transported with: 
[facility] 
 O2 @ 8 liters Tech

## 2020-10-31 NOTE — ED NOTES
Patient O2 sats ranging from 90-93% on 6LNC, placed on [place] NC 8L per RT recommendation. Sats now 94-95%

## 2020-10-31 NOTE — PROGRESS NOTES
Notified MD of pts potassium of 2.9. Orders for replacement plan. Also notified of pts continued pain. Norco frequency increased to q4hr from [employer], as well as Morphine 2mg q6hr PRN. Orders readback and verified.

## 2020-10-31 NOTE — PROGRESS NOTES
Hospitalist Note Admit Date:  10/30/2020  5:42 PM  
Name:  Jennifer Brower Age:  44 y.o. 
:  1970 MRN:  [id number] PCP:  Loretta Estevez MD 
Treatment Team: Attending Provider: Agnieszka Malik MD; Primary Nurse: Jordon Valdivia; Consulting Provider: Obi Negrete MD; Primary Nurse: Jannet Little HPI/Subjective:  
58-year-old female with past medical history significant for hypertension, rheumatoid arthritis, obstructive sleep apnea on CPAP and depression/anxiety presented to the ED for worsening of shortness of breath. Patient reports recent diagnosis of COVID-19 infection 8 days ago. Symptoms started as fatigue, body aches and shortness of breath. Patient reports worsening of exertional dyspnea for last 2 days and noted desaturations yesterday in 62s. She is using CPAP at home and tried it with improvement in saturation. Denies fever, chills, nausea, vomiting, abdominal pain or diarrhea.  
  
In the ED patient saturated in 80s on room air and was started on oxygen via nasal cannula requiring 6 to 8 L to maintain oxygen level above 92%. Labs shows WBC 6.1, hemoglobin 13.4, potassium 3, ferritin 348, CRP 11.7 and lactic acid 1.5. CXR shows diffuse bilateral small airspace consolidations characteristic of pneumonia from COVID-19. Hospitalist consulted for admission. 10/31 - pt on 9L. SOB worse with exertion, feels OK at rest.  Some cough. No fevers. No other complaints Objective:  
 
Patient Vitals for the past 24 hrs: 
 Temp Pulse Resp BP SpO2  
10/31/20 0736     90 % 10/31/20 0734 98.2 °F (36.8 °C) 62 20 (!) 97/52 94 % 10/31/20 0601     95 % 10/31/20 0511     91 % 10/31/20 0315 97.8 °F (36.6 °C) 62 22 (!) 100/57 91 % 10/31/20 0300     (!) 87 % 10/31/20 0225     92 % 10/30/20 2330 97.7 °F (36.5 °C) 66 20 (!) 104/56 91 % 10/30/20 2130 97.6 °F (36.4 °C) 77 24 109/62 90 % 10/30/20 2112     95 % 10/30/20 2109  82   93 % 10/30/20 2030  77   94 % 10/30/20 1938  79  (!) 100/57 97 % 10/30/20 1904  76   95 % 10/30/20 1859  73  113/65 91 % 10/30/20 1813     93 % 10/30/20 1726     92 % 10/30/20 1724     (!) 85 % 10/30/20 1719 99.4 °F (37.4 °C) 83 24 108/71 (!) 81 % Oxygen Therapy O2 Sat (%): 90 % (10/31/20 0736) Pulse via Oximetry: 64 beats per minute (10/31/20 0736) O2 Device: Hi flow nasal cannula (10/31/20 0736) O2 Flow Rate (L/min): 10 l/min (10/31/20 0736) Estimated body mass index is 43.71 kg/m² as calculated from the following: 
  Height as of this encounter: 5' 2\" (1.575 m). Weight as of this encounter: 108.4 kg (239 lb). Intake/Output Summary (Last 24 hours) at 10/31/2020 9931 Last data filed at 10/31/2020 0602 Gross per 24 hour Intake 1200 ml Output 1025 ml Net 175 ml *Note that automatically entered I/Os may not be accurate; dependent on patient compliance with collection and accurate  by techs. General:    Well nourished. No overt distress CV:   RRR. No edema Lungs:   Even, Unlabored Abdomen:   nondistended. Extremities: Warm and dry. No cyanosis or clubbing Skin:     No rashes. No jaundice. Normal coloration Neuro:  No gross focal deficits. Alert Data Reviewed: 
I have reviewed all labs, meds, and studies from the last 24 hours: 
Recent Results (from the past 24 hour(s)) CBC WITH AUTOMATED DIFF Collection Time: 10/30/20  5:25 PM  
Result Value Ref Range WBC 6.1 4.3 - 11.1 K/uL  
 RBC 4.37 4.05 - 5.2 M/uL  
 HGB 13.4 11.7 - 15.4 g/dL HCT 39.4 35.8 - 46.3 % MCV 90.2 79.6 - 97.8 FL  
 MCH 30.7 26.1 - 32.9 PG  
 MCHC 34.0 31.4 - 35.0 g/dL  
 RDW 13.9 11.9 - 14.6 % PLATELET 505 781 - 786 K/uL MPV 9.4 9.4 - 12.3 FL ABSOLUTE NRBC 0.00 0.0 - 0.2 K/uL  
 DF AUTOMATED NEUTROPHILS 73 43 - 78 % LYMPHOCYTES 16 13 - 44 % MONOCYTES 10 4.0 - 12.0 % EOSINOPHILS 0 (L) 0.5 - 7.8 % BASOPHILS 0 0.0 - 2.0 % IMMATURE GRANULOCYTES 0 0.0 - 5.0 %  
 ABS. NEUTROPHILS 4.4 1.7 - 8.2 K/UL  
 ABS. LYMPHOCYTES 1.0 0.5 - 4.6 K/UL  
 ABS. MONOCYTES 0.6 0.1 - 1.3 K/UL  
 ABS. EOSINOPHILS 0.0 0.0 - 0.8 K/UL  
 ABS. BASOPHILS 0.0 0.0 - 0.2 K/UL  
 ABS. IMM. GRANS. 0.0 0.0 - 0.5 K/UL METABOLIC PANEL, COMPREHENSIVE Collection Time: 10/30/20  5:25 PM  
Result Value Ref Range Sodium 135 (L) 136 - 145 mmol/L Potassium 3.0 (L) 3.5 - 5.1 mmol/L Chloride 98 98 - 107 mmol/L  
 CO2 32 21 - 32 mmol/L Anion gap 5 (L) 7 - 16 mmol/L Glucose 104 (H) 65 - 100 mg/dL BUN 8 6 - 23 MG/DL Creatinine 0.70 0.6 - 1.0 MG/DL  
 GFR est AA >60 >60 ml/min/1.73m2 GFR est non-AA >60 >60 ml/min/1.73m2 Calcium 8.0 (L) 8.3 - 10.4 MG/DL Bilirubin, total 0.7 0.2 - 1.1 MG/DL  
 ALT (SGPT) 25 12 - 65 U/L  
 AST (SGOT) 52 (H) 15 - 37 U/L Alk. phosphatase 113 50 - 136 U/L Protein, total 7.0 6.3 - 8.2 g/dL Albumin 2.7 (L) 3.5 - 5.0 g/dL Globulin 4.3 (H) 2.3 - 3.5 g/dL A-G Ratio 0.6 (L) 1.2 - 3.5 LACTIC ACID Collection Time: 10/30/20  5:25 PM  
Result Value Ref Range Lactic acid 1.5 0.4 - 2.0 MMOL/L  
C REACTIVE PROTEIN, QT Collection Time: 10/30/20  5:25 PM  
Result Value Ref Range C-Reactive protein 11.7 (H) 0.0 - 0.9 mg/dL FERRITIN Collection Time: 10/30/20  5:25 PM  
Result Value Ref Range Ferritin 348 8 - 388 NG/ML  
CULTURE, BLOOD Collection Time: 10/30/20  6:09 PM  
 Specimen: Blood Result Value Ref Range Special Requests: LEFT Antecubital 
    
 Culture result: NO GROWTH AFTER 11 HOURS    
POC G3 Collection Time: 10/30/20  9:36 PM  
Result Value Ref Range Device: High Flow Nasal Cannula    
 pH (POC) 7.52 (H) 7.35 - 7.45    
 pCO2 (POC) 31.9 (L) 35 - 45 MMHG  
 pO2 (POC) 69 (L) 75 - 100 MMHG  
 HCO3 (POC) 25.9 22 - 26 MMOL/L  
 sO2 (POC) 96 95 - 98 % Base excess (POC) 3 mmol/L Allens test (POC) NOT APPLICABLE  Site LEFT BRACHIAL    
 Specimen type (POC) ARTERIAL Performed by Jg   
 CO2, POC 27 MMOL/L Flow rate (POC) 8.000 L/min Respiratory comment: NurseNotified COLLECT TIME 2,130 URINALYSIS W/ RFLX MICROSCOPIC Collection Time: 10/30/20 11:22 PM  
Result Value Ref Range Color YELLOW Appearance CLEAR Specific gravity 1.008 1.001 - 1.023    
 pH (UA) 7.0 5.0 - 9.0 Protein Negative NEG mg/dL Glucose Negative mg/dL Ketone 15 (A) NEG mg/dL Bilirubin Negative NEG Blood Negative NEG Urobilinogen 4.0 (H) 0.2 - 1.0 EU/dL Nitrites Negative NEG Leukocyte Esterase Negative NEG METABOLIC PANEL, BASIC Collection Time: 10/31/20  1:39 AM  
Result Value Ref Range Sodium 138 136 - 145 mmol/L Potassium 2.9 (L) 3.5 - 5.1 mmol/L Chloride 101 98 - 107 mmol/L  
 CO2 30 21 - 32 mmol/L Anion gap 7 7 - 16 mmol/L Glucose 85 65 - 100 mg/dL BUN 7 6 - 23 MG/DL Creatinine 0.58 (L) 0.6 - 1.0 MG/DL  
 GFR est AA >60 >60 ml/min/1.73m2 GFR est non-AA >60 >60 ml/min/1.73m2 Calcium 7.3 (L) 8.3 - 10.4 MG/DL  
CBC WITH AUTOMATED DIFF Collection Time: 10/31/20  1:39 AM  
Result Value Ref Range WBC 4.8 4.3 - 11.1 K/uL  
 RBC 3.95 (L) 4.05 - 5.2 M/uL  
 HGB 12.2 11.7 - 15.4 g/dL HCT 35.8 35.8 - 46.3 % MCV 90.6 79.6 - 97.8 FL  
 MCH 30.9 26.1 - 32.9 PG  
 MCHC 34.1 31.4 - 35.0 g/dL  
 RDW 13.9 11.9 - 14.6 % PLATELET 375 746 - 159 K/uL MPV 9.4 9.4 - 12.3 FL ABSOLUTE NRBC 0.00 0.0 - 0.2 K/uL  
 DF AUTOMATED NEUTROPHILS 60 43 - 78 % LYMPHOCYTES 27 13 - 44 % MONOCYTES 12 4.0 - 12.0 % EOSINOPHILS 0 (L) 0.5 - 7.8 % BASOPHILS 0 0.0 - 2.0 % IMMATURE GRANULOCYTES 0 0.0 - 5.0 %  
 ABS. NEUTROPHILS 2.9 1.7 - 8.2 K/UL  
 ABS. LYMPHOCYTES 1.3 0.5 - 4.6 K/UL  
 ABS. MONOCYTES 0.6 0.1 - 1.3 K/UL  
 ABS. EOSINOPHILS 0.0 0.0 - 0.8 K/UL  
 ABS. BASOPHILS 0.0 0.0 - 0.2 K/UL  
 ABS. IMM. GRANS. 0.0 0.0 - 0.5 K/UL  
BLOOD TYPE, (ABO+RH) Collection Time: 10/31/20  1:56 AM  
Result Value Ref Range ABO/Rh(D) A POSITIVE Current Meds: 
Current Facility-Administered Medications Medication Dose Route Frequency  albuterol-ipratropium (DUO-NEB) 2.5 MG-0.5 MG/3 ML  3 mL Nebulization Q4H RT  
 atorvastatin (LIPITOR) tablet 40 mg  40 mg Oral QHS  [Held by provider] furosemide (LASIX) tablet 40 mg  40 mg Oral DAILY  [Held by provider] hydroCHLOROthiazide (HYDRODIURIL) tablet 25 mg  25 mg Oral DAILY  pantoprazole (PROTONIX) tablet 40 mg  40 mg Oral ACB  potassium chloride (K-DUR, KLOR-CON) SR tablet 20 mEq  20 mEq Oral DAILY  [Held by provider] propranolol LA (INDERAL LA) capsule 120 mg  120 mg Oral QHS  venlafaxine-SR (EFFEXOR-XR) capsule 150 mg  150 mg Oral DAILY WITH BREAKFAST  zolpidem (AMBIEN) tablet 10 mg  10 mg Oral QHS PRN  
 HYDROcodone-acetaminophen (NORCO) 7.5-325 mg per tablet 1 Tab  1 Tab Oral Q4H PRN  potassium chloride 20 mEq in 100 ml IVPB  20 mEq IntraVENous Q2H  
 NUTRITIONAL SUPPORT ELECTROLYTE PRN ORDERS   Does Not Apply PRN  
 morphine injection 2 mg  2 mg IntraVENous Q6H PRN  
 dexAMETHasone (DECADRON) tablet 4 mg  4 mg Oral Q12H  potassium bicarb-citric acid (EFFER-K) tablet 40 mEq  40 mEq Oral BID  sodium chloride (NS) flush 5-40 mL  5-40 mL IntraVENous Q8H  
 sodium chloride (NS) flush 5-40 mL  5-40 mL IntraVENous PRN  
 acetaminophen (TYLENOL) tablet 650 mg  650 mg Oral Q6H PRN Or  
 acetaminophen (TYLENOL) suppository 650 mg  650 mg Rectal Q6H PRN  polyethylene glycol (MIRALAX) packet 17 g  17 g Oral DAILY PRN  promethazine (PHENERGAN) tablet 12.5 mg  12.5 mg Oral Q6H PRN  Or  
 ondansetron (ZOFRAN) injection 4 mg  4 mg IntraVENous Q6H PRN  
 enoxaparin (LOVENOX) injection 40 mg  40 mg SubCUTAneous Q12H  
 0.9% sodium chloride infusion 250 mL  250 mL IntraVENous PRN  
 ascorbic acid (vitamin C) (VITAMIN C) tablet 250 mg  250 mg Oral BID  
  zinc sulfate tablet 220 mg  220 mg Oral DAILY Other Studies: No results found for this visit on 10/30/20. Xr Chest [facility] Result Date: 10/30/2020 EXAMINATION: CHEST RADIOGRAPH 10/30/2020 6:36 PM INDICATION: Shortness of breath and hypoxia. COVID 19 positive COMPARISON: None TECHNIQUE: A single view of the chest was obtained. FINDINGS: Support Devices: None Osseous : No acute abnormality. Cardiomediastinal Silhouette: Normal in size. Lungs: Diffuse bilateral small airspace consolidations. Pleura: No pleural fluid or pneumothorax. Upper Abdomen: No abnormality. IMPRESSION: Diffuse bilateral small airspace consolidations characteristic of pneumonia from COVID 19 All Micro Results Procedure Component Value Units Date/Time CULTURE, BLOOD [[id number]] Collected:  10/30/20 1809 Order Status:  Completed Specimen:  Blood Updated:  10/31/20 8631 Special Requests: --     
  LEFT Antecubital 
  
  Culture result: NO GROWTH AFTER 11 HOURS     
 CULTURE, BLOOD [[id number]] Collected:  10/30/20 1725 Order Status:  Completed Specimen:  Blood Updated:  10/30/20 1731 SARS-CoV-2 Lab Results \"Novel Coronavirus\" Test: No results found for: COV2NT \"Emergent Disease\" Test: No results found for: EDPR \"SARS-COV-2\" Test: No results found for: XGCOVT Rapid Test: No results found for: COVR Assessment and Plan:  
 
Hospital Problems as of 10/31/2020 Date Reviewed: 10/21/2020 Codes Class Noted - Resolved POA * (Principal) Acute hypoxemic respiratory failure due to COVID-19 [facility]) ICD-10-CM: U07.1, J96.01 
ICD-9-CM: 518.81, 079.89, 799.02  10/30/2020 - Present Yes COVID-19 ICD-10-CM: U07.1 ICD-9-CM: 079.89  10/30/2020 - Present Yes  
   
 GLENYS (obstructive sleep apnea) (Chronic) ICD-10-CM: F32.95 
ICD-9-CM: 327.23  7/21/2015 - Present Yes HTN (hypertension) (Chronic) ICD-10-CM: I10 
ICD-9-CM: 401.9  12/14/2012 - Present Yes Overview Signed 12/17/2012  9:09 AM by Alana Naik MD  
  The patient is stable on the current medications. Tolerating well. No sides effects. Will not adjust at this time. HLD (hyperlipidemia) (Chronic) ICD-10-CM: Q49.6 ICD-9-CM: 272.4  12/14/2012 - Present Yes Overview Signed 12/17/2012  9:09 AM by Alana Naik MD  
  The patient is stable on the current medications. Tolerating well. No sides effects. Will not adjust at this time. Fibromyalgia (Chronic) ICD-10-CM: M79.7 ICD-9-CM: 729.1  12/14/2012 - Present Yes Overview Signed 12/17/2012  9:10 AM by Alana Naik MD  
  The patient is stable on the current medications. Tolerating well. No sides effects. Will not adjust at this time. RA (rheumatoid arthritis) (HCC) (Chronic) ICD-10-CM: M06.9 ICD-9-CM: 714.0  12/14/2012 - Present Yes Overview Signed 12/17/2012  9:11 AM by Alana Naik MD  
  The patient is stable on the current medications. Tolerating well. No sides effects. Will not adjust at this time. Followed by Dr. Flako Méndez. Plan: 
COVID 
-decadron x10d 
-conv plasma ordered 
-cannot get remdesivir. Too far out from dx 
-albuterol HFA 
-mucinex 
-incentive lm 
-mobilize when able 
-suppress cough with PRNs if excessive -DVT ppx: lovenox 
-allergic to basically all antibiotics could give for respiratory issues. Statistically, most of these would not be true allergies. Pt may be shooting herself in the foot by limiting her options. -will give doxy for 5 days HTN 
-hold home meds as BP borderline 
-no IV morphine Neurogenic bladder 
-has kapadia in 
-self caths at home 
-high risk for infection, needs to come out in a few days. Not on abx that could prevent DC planning/Dispo:   
-home when improved Diet:  DIET REGULAR Other listed chronic conditions stable, cont current management.  
 
Signed: 
Alissa Leal MD

## 2020-10-31 NOTE — PROGRESS NOTES
10/31/20 1418 10/31/20 1438 Oxygen Therapy O2 Sat (%) (!) 87 % (RT notified) 93 % Pulse via Oximetry  --  73 beats per minute O2 Device Hi flow nasal cannula Heated; Hi flow nasal cannula O2 Flow Rate (L/min) 15 l/min 
(placed pt on Airvo 50L and 80%) 50 l/min O2 Temperature  --  87.8 °F (31 °C) FIO2 (%)  --  80 %

## 2020-10-31 NOTE — PROGRESS NOTES
Pt resting in bed and denies complaints at this time. Pt reports she was [place] all over\" but that the PRN Morphine helped. Lopez in place and draining. O2 at 9L hi flow and pt denies SOB at rest. Pt does have frequent, non-productive cough.

## 2020-10-31 NOTE — PROGRESS NOTES
Updated MD about pts O2 demand increase, pt just arrived to floor. Pt was on 8L in ED and on arrival. ABG 7.52/31.9/69/25.9, now on 9L HFNC. Not too distressed but fatigued and dyspneic on exertion. Sounds diminished. Afebrile. Pt also anxious and in pain. Requested home dose of norco to resume. Orders received for home [place] to resume. Orders read back and verified. Also notified of pts hx of paralyzed bladder, self caths 3-4 times daily at home. Requested order to self-cath. MD ordered continuous kapadia catheter. Orders read back and verified.

## 2020-10-31 NOTE — ED NOTES
Notified 8th floor RT of ABG ordered for this patient. Patient heading up to the 8th floor at this time.

## 2020-10-31 NOTE — PROGRESS NOTES
Pt is 88% on 14L hi alonzo O2. Pt. Denies any distress. RT notified and at bedside. PRN breathing treatment given and pt is now 92% on 14L. Dr. Gregory Harman notified.

## 2020-10-31 NOTE — CONSULTS
Infectious Disease Non-face to Face Encounter    Today's date: 10/31/2020  Admit date: 10/30/2020    Impression:     1. COVID-19 infection (~ 10/22/20)  2. Acute hypoxic respiratory failure    Recommendations:      Agree with convalescent serum if available  · Patient is not a candidate for Remdesivir due to being too far out from diagnosis to benefit from treatment. · Agree with dexamethasone 6 mg po Q24h for up to 10 days pending clinical improvement. Given large volume of patients with COVID, we are unable to follow each patient closely. Please call us if there is a clinical changes/questions/concerns through [address] [facility] for Patients And Parent/Caregivers   Fact Sheets for Patients and Parent/Caregivers - [place]    Anti-Infectives:      none    Clinical Synopsis:     Leesa Mills MD messaged requesting ID opinion/advice to assist on the care and management of this patient. Patient's chart was reviewed including relevant notes, labs, imaging, medications, pathology, PMH, PSH, FH, and SH. Briefly, patient admitted on 10/30/2020 after presenting with progressive SOB over the last several days. She reports being diagnosed with COVID 8 days prior to presentation. Patient was found to be hypoxic in the ED and is still requiring high flow nasal cannula. Allergies: Allergies   Allergen Reactions    Codeine Palpitations    Flagyl [Metronidazole] Hives    Keflex [Cephalexin] Palpitations and Rash     Other reaction(s): Rash-A  CDiff    Levaquin [Levofloxacin] Hives     Other reaction(s): Hallucinations-I    Lyrica [Pregabalin] Swelling    Macrobid [Nitrofurantoin Monohyd/M-Cryst] Hives     Other reaction(s): Hives/Swelling-A, Rash-A    Neurontin [Gabapentin] Swelling    Other Medication Anaphylaxis     RUTUSS allergy - dizziness    Penicillins Swelling     Other reaction(s):  Anaphylactic shock-A    Percocet [Oxycodone-Acetaminophen] Swelling     Other reaction(s): Hives/Swelling-A, Rash-A    Robitussin A-C [Codeine-Guaifenesin] Rash    Sulfa (Sulfonamide Antibiotics) Rash     Other reaction(s): Unknown    Tetanus Toxoid Unknown (comments)     Other reaction(s): Unknown    Tetanus Vaccines And Toxoid Swelling    Tussin  [Dextromethorphan Hbr] Unknown (comments)    Tussin Dm Cough Medicine Unknown (comments)    Vancomycin Other (comments)     Other reaction(s): Hives/Swelling-A  \" Inability to walk\" & Edema    Whey Other (comments)     \"blisters in my esophagus and stomach\"     Xyzal [Levocetirizine] Other (comments)     Made her mean and irritable       Anticoagulants:  Key Anti-Platelet Anticoagulant Meds     The patient is on no antiplatelet meds or anticoagulants.            Objective:     Physical Exam:    Vitals:   Current   Temperature: 98.2 °F (36.8 °C)   Heart Rate: 62   Resp Rate: 20   Blood Pressure: (!) 97/52   Oxygen Sats: 90 %         Patient Vitals for the past 12 hrs:   Temp Pulse Resp BP SpO2   10/31/20 0736     90 %   10/31/20 0734 98.2 °F (36.8 °C) 62 20 (!) 97/52 94 %   10/31/20 0601     95 %   10/31/20 0511     91 %   10/31/20 0315 97.8 °F (36.6 °C) 62 22 (!) 100/57 91 %   10/31/20 0300     (!) 87 %   10/31/20 0225     92 %   10/30/20 2330 97.7 °F (36.5 °C) 66 20 (!) 104/56 91 %   10/30/20 2130 97.6 °F (36.4 °C) 77 24 109/62 90 %   10/30/20 2112     95 %   10/30/20 2109  82   93 %       No components found for: QTC      Labs:     COVID 19 related labs:  C-Reactive protein   Date Value Ref Range Status   10/30/2020 11.7 (H) 0.0 - 0.9 mg/dL Final       CBC:      CBC WITH AUTOMATED DIFF    Collection Time: 10/31/20  1:39 AM   Result Value Ref Range    WBC 4.8 4.3 - 11.1 K/uL    RBC 3.95 (L) 4.05 - 5.2 M/uL    HGB 12.2 11.7 - 15.4 g/dL    HCT 35.8 35.8 - 46.3 %    MCV 90.6 79.6 - 97.8 FL    MCH 30.9 26.1 - 32.9 PG    MCHC 34.1 31.4 - 35.0 g/dL    RDW 13.9 11.9 - 14.6 %    PLATELET 515 580 - 154 K/uL    MPV 9.4 9.4 - 12.3 FL ABSOLUTE NRBC 0.00 0.0 - 0.2 K/uL    DF AUTOMATED      NEUTROPHILS 60 43 - 78 %    LYMPHOCYTES 27 13 - 44 %    MONOCYTES 12 4.0 - 12.0 %    EOSINOPHILS 0 (L) 0.5 - 7.8 %    BASOPHILS 0 0.0 - 2.0 %    IMMATURE GRANULOCYTES 0 0.0 - 5.0 %    ABS. NEUTROPHILS 2.9 1.7 - 8.2 K/UL    ABS. LYMPHOCYTES 1.3 0.5 - 4.6 K/UL    ABS. MONOCYTES 0.6 0.1 - 1.3 K/UL    ABS. EOSINOPHILS 0.0 0.0 - 0.8 K/UL    ABS. BASOPHILS 0.0 0.0 - 0.2 K/UL    ABS. IMM. GRANS. 0.0 0.0 - 0.5 K/UL       CMP:  No results found for this visit on 10/30/20 (from the past 12 hour(s)).     Microbiology:     All Micro Results     Procedure Component Value Units Date/Time    CULTURE, BLOOD [[id number]] Collected:  10/30/20 1809    Order Status:  Completed Specimen:  Blood Updated:  10/31/20 0625     Special Requests: --        LEFT  Antecubital       Culture result: NO GROWTH AFTER 11 HOURS       CULTURE, BLOOD [[id number]] Collected:  10/30/20 1725    Order Status:  Completed Specimen:  Blood Updated:  10/30/20 1731          Recent Imaging:     Reviewed    Signed By: Roger Alarcon MD MD    October 31, 2020      Time spent on the above: 10 minutes

## 2020-11-01 PROBLEM — J12.82 PNEUMONIA DUE TO COVID-19 VIRUS: Status: ACTIVE | Noted: 2020-01-01

## 2020-11-01 PROBLEM — U07.1 PNEUMONIA DUE TO COVID-19 VIRUS: Status: ACTIVE | Noted: 2020-01-01

## 2020-11-01 NOTE — PROGRESS NOTES
Patient increasingly anxious  Dr Ruby Boast notified  Ativan . 5mg po  Patient asking me to stay with her  Sat with patient for few minuets

## 2020-11-01 NOTE — PROGRESS NOTES
Hospitalist Progress Note Admit Date:  10/30/2020  5:42 PM  
Name:  Dino Botello Age:  44 y.o. 
:  1970 MRN:  [id number] PCP:  Claudio Campos MD 
Treatment Team: Attending Provider: Ericka Macedo MD; Consulting Provider: Oracio Otero MD; Primary Nurse: Silvano Arriaga; Consulting Provider: Ericka Macedo MD 
 
Subjective:  
61-year-old female with past medical history significant for hypertension, rheumatoid arthritis, obstructive sleep apnea on CPAP and depression/anxiety presented to the ED for worsening of shortness of breath.  Patient reports recent diagnosis of COVID-19 infection 8 days ago.  Symptoms started as fatigue, body aches and shortness of breath.  Patient reports worsening of exertional dyspnea for last 2 days and noted desaturations yesterday in 62s.  She is using CPAP at home and tried it with improvement in saturation.  Denies fever, chills, nausea, vomiting, abdominal pain or diarrhea.  
  
In the ED patient saturated in 80s on room air and was started on oxygen via nasal cannula requiring 6 to 8 L to maintain oxygen level above 92%.  Labs shows WBC 6.1, hemoglobin 13.4, potassium 3, ferritin 348, CRP 11.7 and lactic acid 1.5.  CXR shows diffuse bilateral small airspace consolidations characteristic of pneumonia from COVID-19.  Hospitalist consulted for admission. 
  
10/30 plasma given 10/31 - pt on 9L. SOB worse with exertion, feels OK at rest.  Some cough. No fevers.   
on airvo 50L 100% saturating 80-90% Encourage breathing through nose Self prone as tolerated Restart home propranolol Low dose ativan for anxiety Nursing notes and chart reviewed. Review of Systems negative with exception of pertinent positives noted above. Objective:  
 
Patient Vitals for the past 24 hrs: 
 Temp Pulse Resp BP SpO2  
20 0810 97.6 °F (36.4 °C) 67 (!) 32 111/69 94 % 20 0721     94 % 11/01/20 0409 97.6 °F (36.4 °C) 67 20 121/64 96 % 10/31/20 2337 97.5 °F (36.4 °C) 72 22 109/65 93 % 10/31/20 2037     93 % 10/31/20 1924 97.7 °F (36.5 °C) 79 21 112/63 93 % 10/31/20 1551 98.3 °F (36.8 °C) 67 22 101/68 93 % 10/31/20 1456     90 % 10/31/20 1438     93 % 10/31/20 1418 98.1 °F (36.7 °C) 73 22 109/62 (!) 87 % 10/31/20 1320 98 °F (36.7 °C) 78 20 (!) 98/54 91 % 10/31/20 1220 98.1 °F (36.7 °C) 69 22 100/65 91 % 10/31/20 1201     92 % 10/31/20 1124 98.1 °F (36.7 °C) 68 22 (!) 96/56 90 % Oxygen Therapy O2 Sat (%): 94 % (11/01/20 0810) Pulse via Oximetry: 74 beats per minute (11/01/20 0721) O2 Device: Heated; Hi flow nasal cannula (11/01/20 0721) O2 Flow Rate (L/min): 50 l/min (11/01/20 0721) O2 Temperature: 87.8 °F (31 °C) (11/01/20 0721) FIO2 (%): 95 % (11/01/20 0721) Intake/Output Summary (Last 24 hours) at 11/1/2020 9677 Last data filed at 11/1/2020 9404 Gross per 24 hour Intake  Output 1350 ml Net -1350 ml General:    Well nourished. Alert. Anxious. On 50L 100% airvo. Not wearing a mask. CV:   RRR. No murmur, rub, or gallop. Lungs:   Crackles at the bases Abdomen:   Soft, nontender, nondistended. Extremities: Warm and dry. No cyanosis or edema. Skin:     No rashes or jaundice. Data Review: 
I have reviewed all labs, meds, telemetry events, and studies from the last 24 hours. Recent Results (from the past 24 hour(s)) MAGNESIUM Collection Time: 11/01/20  4:16 AM  
Result Value Ref Range Magnesium 2.4 1.8 - 2.4 mg/dL PHOSPHORUS Collection Time: 11/01/20  4:16 AM  
Result Value Ref Range Phosphorus 2.3 (L) 2.5 - 4.5 MG/DL  
METABOLIC PANEL, BASIC Collection Time: 11/01/20  4:16 AM  
Result Value Ref Range Sodium 138 136 - 145 mmol/L Potassium 4.0 3.5 - 5.1 mmol/L Chloride 104 98 - 107 mmol/L  
 CO2 29 21 - 32 mmol/L Anion gap 5 (L) 7 - 16 mmol/L Glucose 144 (H) 65 - 100 mg/dL BUN 7 6 - 23 MG/DL Creatinine 0.53 (L) 0.6 - 1.0 MG/DL  
 GFR est AA >60 >60 ml/min/1.73m2 GFR est non-AA >60 >60 ml/min/1.73m2 Calcium 8.1 (L) 8.3 - 10.4 MG/DL All Micro Results Procedure Component Value Units Date/Time CULTURE, BLOOD [[id number]] Collected:  10/30/20 1625 Order Status:  Completed Specimen:  Blood Updated:  11/01/20 0233 Special Requests: --     
  LEFT Antecubital 
  
  Culture result: NO GROWTH 2 DAYS     
 CULTURE, BLOOD [[id number]] Collected:  10/30/20 1709 Order Status:  Completed Specimen:  Blood Updated:  11/01/20 7474 Special Requests: --     
  LEFT Antecubital 
  
  Culture result: NO GROWTH 2 DAYS Current Meds: 
Current Facility-Administered Medications Medication Dose Route Frequency  atorvastatin (LIPITOR) tablet 40 mg  40 mg Oral QHS  [Held by provider] furosemide (LASIX) tablet 40 mg  40 mg Oral DAILY  [Held by provider] hydroCHLOROthiazide (HYDRODIURIL) tablet 25 mg  25 mg Oral DAILY  pantoprazole (PROTONIX) tablet 40 mg  40 mg Oral ACB  potassium chloride (K-DUR, KLOR-CON) SR tablet 20 mEq  20 mEq Oral DAILY  [Held by provider] propranolol LA (INDERAL LA) capsule 120 mg  120 mg Oral QHS  venlafaxine-SR (EFFEXOR-XR) capsule 150 mg  150 mg Oral DAILY WITH BREAKFAST  zolpidem (AMBIEN) tablet 10 mg  10 mg Oral QHS PRN  
 NUTRITIONAL SUPPORT ELECTROLYTE PRN ORDERS   Does Not Apply PRN  
 dexAMETHasone (DECADRON) tablet 4 mg  4 mg Oral Q12H  
 guaiFENesin ER (MUCINEX) tablet 1,200 mg  1,200 mg Oral BID  doxycycline (VIBRAMYCIN) capsule 100 mg  100 mg Oral Q12H  
 HYDROcodone-acetaminophen (NORCO) 5-325 mg per tablet 1 Tab  1 Tab Oral Q4H PRN  
 HYDROcodone-acetaminophen (NORCO)  mg tablet 1 Tab  1 Tab Oral Q4H PRN  
 budesonide-formoterol (SYMBICORT) 80-4.5 mcg inhaler  2 Puff Inhalation BID RT  
 albuterol (PROVENTIL HFA, VENTOLIN HFA, PROAIR HFA) inhaler 2 Puff  2 Puff Inhalation Q4H PRN  
  alum-mag hydroxide-simeth (MYLANTA) oral suspension 30 mL  30 mL Oral Q4H PRN  
 sodium chloride (NS) flush 5-40 mL  5-40 mL IntraVENous Q8H  
 sodium chloride (NS) flush 5-40 mL  5-40 mL IntraVENous PRN  
 acetaminophen (TYLENOL) tablet 650 mg  650 mg Oral Q6H PRN Or  
 acetaminophen (TYLENOL) suppository 650 mg  650 mg Rectal Q6H PRN  polyethylene glycol (MIRALAX) packet 17 g  17 g Oral DAILY PRN  promethazine (PHENERGAN) tablet 12.5 mg  12.5 mg Oral Q6H PRN Or  
 ondansetron (ZOFRAN) injection 4 mg  4 mg IntraVENous Q6H PRN  
 enoxaparin (LOVENOX) injection 40 mg  40 mg SubCUTAneous Q12H  
 0.9% sodium chloride infusion 250 mL  250 mL IntraVENous PRN  
 ascorbic acid (vitamin C) (VITAMIN C) tablet 250 mg  250 mg Oral BID  zinc sulfate tablet 220 mg  220 mg Oral DAILY Other Studies (last 24 hours): No results found. Assessment and Plan:  
 
Hospital Problems as of 11/1/2020 Date Reviewed: 10/21/2020 Codes Class Noted - Resolved POA * (Principal) Acute hypoxemic respiratory failure due to COVID-19 [facility]) ICD-10-CM: U07.1, J96.01 
ICD-9-CM: 518.81, 079.89, 799.02  10/30/2020 - Present Yes COVID-19 ICD-10-CM: U07.1 ICD-9-CM: 079.89  10/30/2020 - Present Yes  
   
 GLENYS (obstructive sleep apnea) (Chronic) ICD-10-CM: X37.11 
ICD-9-CM: 327.23  7/21/2015 - Present Yes HTN (hypertension) (Chronic) ICD-10-CM: I10 
ICD-9-CM: 401.9  12/14/2012 - Present Yes Overview Signed 12/17/2012  9:09 AM by Stacey Villasenor MD  
  The patient is stable on the current medications. Tolerating well. No sides effects. Will not adjust at this time. HLD (hyperlipidemia) (Chronic) ICD-10-CM: D42.1 ICD-9-CM: 272.4  12/14/2012 - Present Yes Overview Signed 12/17/2012  9:09 AM by Stacey Villasenor MD  
  The patient is stable on the current medications. Tolerating well. No sides effects. Will not adjust at this time. Fibromyalgia (Chronic) ICD-10-CM: M79.7 ICD-9-CM: 729.1  12/14/2012 - Present Yes Overview Signed 12/17/2012  9:10 AM by Dallas Montgomery MD  
  The patient is stable on the current medications. Tolerating well. No sides effects. Will not adjust at this time. RA (rheumatoid arthritis) (HCC) (Chronic) ICD-10-CM: M06.9 ICD-9-CM: 714.0  12/14/2012 - Present Yes Overview Signed 12/17/2012  9:11 AM by Dallas Montgomery MD  
  The patient is stable on the current medications. Tolerating well. No sides effects. Will not adjust at this time. Followed by Dr. Hailey Steven. PLAN:   
Acute hypoxic respiratory failure 2/2 COVID 
-decadron x10d 
-conv plasma ordered 
-cannot get remdesivir. Too far out from dx 
-albuterol HFA 
-mucinex 
-incentive lm 
-mobilize when able 
-suppress cough with PRNs if excessive -DVT ppx: lovenox 
-has documented allergies to basically all antibiotics - has tolerated doxycycline in the past 
-doxy for 5 days 
-self prone as tolerated 
  
HTN 
-hold home meds as BP borderline, okay to give propranolol  
-no IV morphine 
  
Neurogenic bladder 
-has kapadia in 
-self caths at home 
-high risk for infection, needs to come Multiple drug allergies Needs to see allergist outpatient in order to verify allergies. DC planning/Dispo:  Home when able DVT ppx:  lovenox Signed: 
Eusebio Bustos MD

## 2020-11-01 NOTE — PROGRESS NOTES
Shift assessment done. Pt in bed, watching tv. Respirations present, dyspnea on exertion noted. On Airvo 50L with O2 sat of 93%. No acute signs of distress noted. Instructed to call for assistance when needed. Call light in reach. Safety measures provided. Will continue to monitor.

## 2020-11-01 NOTE — PROGRESS NOTES
Date of Outreach Update: 
Lea Fiore 's chart was reviewed. MEWS Score: 2 (11/01/20 1159) Vitals:  
 11/01/20 0721 11/01/20 0730 11/01/20 0810 11/01/20 1159 BP:   111/69 106/69 Pulse:   67 84 Resp:   (!) 32 28 Temp:   97.6 °F (36.4 °C) 97.5 °F (36.4 °C) SpO2: 94% 94% 94% 100% Weight:      
Height:      
  
 
 Pain Assessment Pain Intensity 1: 0 (11/01/20 0730) Pain Location 1: Back Pain Intervention(s) 1: Medication (see MAR) Patient Stated Pain Goal: 0 Previous Outreach assessment has been reviewed. There have been no significant clinical changes since the completion of the last dated Outreach assessment. VS, labs, and progress notes reviewed. Currently seems to be oxygenating adequately on AirVo. Will continue to follow up per outreach protocol. Signed By:   Shruthi Parish RN   November 1, 2020 3:15 PM

## 2020-11-01 NOTE — PROGRESS NOTES
Patient is alert  Appears dyspneic  A little anxious  airv at 50L 94%  sats 88% to 94%  Denies pain   Says breathing feels \"ok\"

## 2020-11-01 NOTE — CONSULTS
CONSULT NOTE    Paradise Connors    11/1/2020    Date of Admission:  10/30/2020    The patient's chart is reviewed and the patient is discussed with the staff. Subjective:     Patient is a 44 y.o.  female  with past medical history of hypertension, rheumatoid arthritis, obstructive sleep apnea on CPAP and depression/anxiety presented to the ED on 10/30 for worsening of shortness of breath.  Patient reports recent diagnosis of COVID-19 infection 8 days ago.  Symptoms started as fatigue, body aches and shortness of breath.  Patient reports worsening of exertional dyspnea for last 2 days and noted desaturations yesterday in 62s.  She is using CPAP at home and tried it with improvement in saturation.  Denies fever, chills, nausea, vomiting, abdominal pain or diarrhea. She did have loss of taste and smell.     In the ED patient saturated in 80s on room air and was started on oxygen via nasal cannula requiring 6 to 8 L to maintain oxygen level above 92%.  Labs shows WBC 6.1, hemoglobin 13.4, potassium 3, ferritin 348, CRP 11.7 and lactic acid 1.5.  CXR shows diffuse bilateral small airspace consolidations characteristic of pneumonia from COVID-19  Following admission she has received dexamethasone and convalescent plasma but is  Too far out from dx to get remdesivire. She has required increase fio2 since admit and today she is on 94% airvo and sats are low 90s but do drop in the 70s with any movement.   Review of Systems  Constitutional: negative for fevers and chills  Eyes: negative for visual disturbance  Ears, nose, mouth, throat, and face: negative for sore throat  Cardiovascular: negative for dyspnea  Gastrointestinal: negative for nausea, vomiting and abdominal pain  Genitourinary:positive for urinary incontinence  Musculoskeletal:negative for arthralgias  Neurological: negative for headaches    Patient Active Problem List   Diagnosis Code    HTN (hypertension) I10    HLD (hyperlipidemia) E78.5    Fibromyalgia M79.7    RA [facility]rheumatoid [facility]) [facility] M06.9    Chronic fatigue R53.82    H/O Clostridium difficile infection Z86.19    Recurrent UTI N39.0    Asthma J45.909    Depression F32.9    Menopausal state N95.1    Malaise and fatigue R53.81, R53.83    GERD (gastroesophageal reflux disease) K21.9    Morbid obesity ([facility]) E66.01    GLENYS (obstructive sleep apnea) G47.33    Persistent disorder of initiating or maintaining sleep G47.00    Urgency of urination R39.15    Dyslipidemia E78.5    Essential hypertension I10    Palpitations R00.2    Allergic rhinitis J30.9    Eczema L30.9    Edema R60.9    Abnormal liver function tests R94.5    Essential tremor G25.0    Irritable bowel syndrome K58.9    Lateral epicondylitis M77.10    Osteopenia M85.80    Pain in joint involving pelvic region and thigh M25.559    Polyarthritis M13.0    Postablative ovarian failure E89.40    Primary localized osteoarthritis of pelvic region and thigh M16.10    Vitamin D deficiency E55.9    Neck pain M54.2    Sciatica M54.30    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    PTSD (post-traumatic stress disorder) F43.10    Osteoarthritis M19.90    Obesity, morbid (more than 100 lbs over ideal weight or BMI > 40) ([facility]) E66.01    Kidney stones N20.0    Heart palpitations R00.2    Diabetes ([facility]) E11.9    Cystitis N30.90    Chronic pain G89.29    ADHD (attention deficit hyperactivity disorder) F90.9    Incisional hernia K43.2    Acute hypoxemic respiratory failure due to COVID-19 ([facility]) U07.1, J96.01    COVID-19 U07.1    Pneumonia due to COVID-19 virus U07.1, J12.89           Prior to Admission Medications   Prescriptions Last Dose Informant Patient Reported? Taking? BD Tuberculin Syringe 1 mL 25 gauge x 5/8\" syrg   Yes No   Cholecalciferol, Vitamin D3, 50,000 unit cap 10/30/2020 at Unknown time  Yes Yes   Sig: Take  by mouth.    HYDROcodone-acetaminophen ([address]) 7.5-325 mg per tablet 10/30/2020 at Unknown time  No Yes   Sig: Take 1 Tab by mouth every six (6) hours as needed for Pain. Max Daily Amount: 4 Tabs. Myrbetriq 50 mg ER tablet Not Taking at Unknown time  No No   Sig: Take 1 Tab by mouth daily. albuterol (Ventolin HFA) 90 mcg/actuation inhaler 2020 at Unknown time  No Yes   Sig: Take 2 Puffs by inhalation every six (6) hours as needed for Wheezing. atorvastatin (LIPITOR) 40 mg tablet 10/30/2020 at Unknown time  Yes Yes   certolizumab pegoL (Cimzia Starter Kit) 400 mg/2 mL (200 mg/mL x 2) sykt injection 2020 at Unknown time  Yes Yes   Si mg by SubCUTAneous route. cpap machine kit 10/30/2020 at Unknown time  Yes Yes   Sig: by Does Not Apply route. 10 cm   cyanocobalamin (VITAMIN B12) 1,000 mcg/mL injection 10/24/2020 at Unknown time  No Yes   Si mL by IntraMUSCular route every seven (7) days. diclofenac (Voltaren) 1 % gel 2020 at Unknown time  Yes Yes   Sig: apply 2-4 grams to affected area   diphenoxylate-atropine (LomotiL) 2.5-0.025 mg per tablet 2020 at Unknown time  No Yes   Sig: Take 1 Tab by mouth four (4) times daily as needed for Diarrhea. Max Daily Amount: 4 Tabs.   ergocalciferol (Drisdol) 1,250 mcg (50,000 unit) capsule Not Taking at Unknown time  Yes No   Si capsule   estradiol (ESTRACE) 1 mg tablet 10/30/2020 at Unknown time  Yes Yes   fluticasone furoate-vilanteroL (Breo Ellipta) 200-25 mcg/dose inhaler Not Taking at Unknown time  No No   Sig: Take 1 Puff by inhalation daily. 1 inhalation each day instead of advair or symbicort) rinse gargle and swallow after using   fluticasone propionate (Flonase) 50 mcg/actuation nasal spray 10/30/2020 at Unknown time  No Yes   Si Sprays by Both Nostrils route daily.    folic acid (FOLVITE) 1 mg tablet 10/30/2020 at Unknown time  Yes Yes   furosemide (Lasix) 40 mg tablet 10/24/2020 at Unknown time  No Yes   Si tablet   hydroCHLOROthiazide (HYDRODIURIL) 25 mg tablet 10/30/2020 at Unknown time  Yes Yes   Sig: Take 25 mg by mouth. hyoscyamine sulfate (NuLev) 0.125 mg tablet 10/29/2020 at Unknown time  No Yes   Si-2 po QID prn abdominal cramping and pain   methen-sod phos-meth blue-hyos (UROGESIC-BLUE) 81.6-40.8-0.12 mg tab 2020 at Unknown time  No Yes   Sig: Take 1 Cap by mouth every six (6) hours as needed for Pain. methotrexate, PF, 25 mg/mL injection 10/24/2020 at Unknown time  Yes Yes   Si.8ml   mometasone (ELOCON) 0.1 % topical cream 10/24/2020 at Unknown time  Yes Yes   Sig: Apply  to affected area. nabumetone (RELAFEN) 750 mg tablet 10/30/2020 at Unknown time  Yes Yes   Sig: two (2) times a day. omeprazole (PRILOSEC) 20 mg capsule 10/30/2020 at Unknown time  No Yes   Si po bid Before eating for stomach   phentermine (ADIPEX-P) 37.5 mg tablet Not Taking at Unknown time  No No   Sig: Take 1 Tab by mouth every morning. Max Daily Amount: 37.5 mg.   potassium chloride (K-DUR, KLOR-CON) 20 mEq tablet Not Taking at Unknown time  No No   Si po qd for potassium   potassium chloride (KLOR-CON) 10 mEq tablet 10/30/2020 at Unknown time  No Yes   Sig: Take 1 Tab by mouth daily. promethazine (PHENERGAN) 25 mg tablet 10/30/2020 at Unknown time  No Yes   Sig: TAKE ONE TABLET BY MOUTH EVERY 6 HOURS AS NEEDED FOR NAUSEA   propranolol LA (INDERAL LA) 120 mg SR capsule 10/30/2020 at Unknown time  Yes Yes   Sig: nightly. Indications: palpitations   venlafaxine-SR (EFFEXOR-XR) 150 mg capsule 10/30/2020 at Unknown time  Yes Yes   zolpidem (Ambien) 10 mg tablet 10/30/2020 at Unknown time  No Yes   Sig: Take 1 Tab by mouth nightly as needed for Sleep. Max Daily Amount: 10 mg. Facility-Administered Medications: None       Past Medical History:   Diagnosis Date    ADHD (attention deficit hyperactivity disorder)     Asthma     MDI    Chronic fatigue     Chronic pain     generalized.      Cystitis     hx; fermin s/p catheter    Depression     Diabetes ([address]     Fibromyalgia     GERD (gastroesophageal reflux disease) 3/11/2015    H/O Clostridium difficile infection 2012    H/O seasonal allergies 2012    Heart palpitations     controlled with med    HLD (hyperlipidemia) 2012    HTN (hypertension) 2012    Hypokalemia 2012    Kidney stones     hx    Obesity, morbid (more than 100 lbs over ideal weight or BMI > 40) ([facility])     BMI 48, HTN, need large BP cuff.      GLENYS (obstructive sleep apnea) 2015    Osteoarthritis     spine, hip    Persistent disorder of initiating or maintaining sleep 2015    Postablative ovarian failure 2020    PTSD (post-traumatic stress disorder)     [facility] [address]     Recurrent UTI 2012    Unspecified adverse effect of anesthesia     had transient blindness s/p anesthesia in ; surgeries since with no problem     Past Surgical History:   Procedure Laterality Date    CYSTOSCOPY      HX  SECTION      HX CHOLECYSTECTOMY      HX GASTRECTOMY  2015    Gastric Sleeve    HX HEENT      [place] teeth removed    HX HYSTERECTOMY      HX OOPHORECTOMY      large mass had the ovary removed    HX PARTIAL HYSTERECTOMY      w/ unilateral oophorectomy    HX PELVIC LAPAROSCOPY      HX TONSILLECTOMY      US GUIDED CORE BREAST BIOPSY Left      Social History     Socioeconomic History    Marital status:      Spouse name: halina    Number of children: 3    Years of education: Not on file    Highest education level: Not on file   Occupational History     Comment: prior respiratory therapist   Social Needs    Financial resource strain: Not on file    Food insecurity     Worry: Not on file     Inability: Not on file   [employer] needs     Medical: Not on file     Non-medical: Not on file   Tobacco Use    Smoking status: Never Smoker    Smokeless tobacco: Never Used   Substance and Sexual Activity    Alcohol use: Yes     Comment: Wine with meals 1-2 x per month.  Drug use: No    Sexual activity: Yes     Partners: Male   Lifestyle    Physical activity     Days per week: Not on file     Minutes per session: Not on file    Stress: Not on file   Relationships    Social connections     Talks on phone: Not on file     Gets together: Not on file     Attends Yazidism service: Not on file     Active member of club or organization: Not on file     Attends meetings of clubs or organizations: Not on file     Relationship status: Not on file    Intimate partner violence     Fear of current or ex partner: Not on file     Emotionally abused: Not on file     Physically abused: Not on file     Forced sexual activity: Not on file   Other Topics Concern    Not on file   Social History Narrative    Not on file     Family History   Problem Relation Age of Onset    Diabetes Mother     Heart Disease Mother     Hypertension Mother     Diabetes Father     Heart Disease Father     Heart Failure Father     Hypertension Father     Kidney Disease Sister     Breast Cancer Maternal Grandmother 77    Hypertension Maternal Grandmother     Hypertension Paternal Grandmother      Allergies   Allergen Reactions    Codeine Palpitations    Flagyl [Metronidazole] Hives    Keflex [Cephalexin] Palpitations and Rash     Other reaction(s): Rash-A  CDiff    Levaquin [Levofloxacin] Hives     Other reaction(s): Hallucinations-I    Lyrica [Pregabalin] Swelling    Macrobid [Nitrofurantoin Monohyd/M-Cryst] Hives     Other reaction(s): Hives/Swelling-A, Rash-A    Neurontin [Gabapentin] Swelling    Other Medication Anaphylaxis     RUTUSS allergy - dizziness    Penicillins Swelling     Other reaction(s):  Anaphylactic shock-A    Percocet [Oxycodone-Acetaminophen] Swelling     Other reaction(s): Hives/Swelling-A, Rash-A    Robitussin A-C [Codeine-Guaifenesin] Rash    Sulfa (Sulfonamide Antibiotics) Rash     Other reaction(s): Unknown    Tetanus Toxoid Unknown (comments)     Other reaction(s): Unknown    Tetanus Vaccines And Toxoid Swelling    Tussin  [Dextromethorphan Hbr] Unknown (comments)    Tussin Dm Cough Medicine Unknown (comments)    Vancomycin Other (comments)     Other reaction(s): Hives/Swelling-A  \" Inability to walk\" & Edema    Whey Other (comments)     \"blisters in my esophagus and stomach\"     Xyzal [Levocetirizine] Other (comments)     Made her mean and irritable       Current Facility-Administered Medications   Medication Dose Route Frequency    propranolol LA (INDERAL LA) capsule 120 mg  120 mg Oral QHS    [START ON 11/2/2020] dexAMETHasone (DECADRON) tablet 6 mg  6 mg Oral DAILY    LORazepam (ATIVAN) tablet 0.5 mg  0.5 mg Oral Q6H PRN    atorvastatin (LIPITOR) tablet 40 mg  40 mg Oral QHS    [Held by provider] furosemide (LASIX) tablet 40 mg  40 mg Oral DAILY    [Held by provider] hydroCHLOROthiazide (HYDRODIURIL) tablet 25 mg  25 mg Oral DAILY    pantoprazole (PROTONIX) tablet 40 mg  40 mg Oral ACB    potassium chloride (K-DUR, KLOR-CON) SR tablet 20 mEq  20 mEq Oral DAILY    venlafaxine-SR (EFFEXOR-XR) capsule 150 mg  150 mg Oral DAILY WITH BREAKFAST    zolpidem (AMBIEN) tablet 10 mg  10 mg Oral QHS PRN    NUTRITIONAL SUPPORT ELECTROLYTE PRN ORDERS   Does Not Apply PRN    guaiFENesin ER (MUCINEX) tablet 1,200 mg  1,200 mg Oral BID    doxycycline (VIBRAMYCIN) capsule 100 mg  100 mg Oral Q12H    HYDROcodone-acetaminophen (NORCO) 5-325 mg per tablet 1 Tab  1 Tab Oral Q4H PRN    HYDROcodone-acetaminophen (NORCO)  mg tablet 1 Tab  1 Tab Oral Q4H PRN    budesonide-formoterol (SYMBICORT) 80-4.5 mcg inhaler  2 Puff Inhalation BID RT    albuterol (PROVENTIL HFA, VENTOLIN HFA, PROAIR HFA) inhaler 2 Puff  2 Puff Inhalation Q4H PRN    alum-mag hydroxide-simeth (MYLANTA) oral suspension 30 mL  30 mL Oral Q4H PRN    sodium chloride (NS) flush 5-40 mL  5-40 mL IntraVENous Q8H    sodium chloride (NS) flush 5-40 mL  5-40 mL IntraVENous PRN    acetaminophen (TYLENOL) tablet 650 mg  650 mg Oral Q6H PRN    Or    acetaminophen (TYLENOL) suppository 650 mg  650 mg Rectal Q6H PRN    polyethylene glycol (MIRALAX) packet 17 g  17 g Oral DAILY PRN    promethazine (PHENERGAN) tablet 12.5 mg  12.5 mg Oral Q6H PRN    Or    ondansetron (ZOFRAN) injection 4 mg  4 mg IntraVENous Q6H PRN    enoxaparin (LOVENOX) injection 40 mg  40 mg SubCUTAneous Q12H    0.9% sodium chloride infusion 250 mL  250 mL IntraVENous PRN    ascorbic acid (vitamin C) (VITAMIN C) tablet 250 mg  250 mg Oral BID    zinc sulfate tablet 220 mg  220 mg Oral DAILY         Objective:     Vitals:    11/01/20 0721 11/01/20 0730 11/01/20 0810 11/01/20 1159   BP:   111/69 106/69   Pulse:   67 84   Resp:   (!) 32 28   Temp:   97.6 °F (36.4 °C) 97.5 °F (36.4 °C)   SpO2: 94% 94% 94% 100%   Weight:       Height:           PHYSICAL EXAM     Constitutional:  the patient is well developed and in no acute distress  EENMT:  Sclera clear, pupils equal, oral mucosa moist  Respiratory:  Bilateral crackle  Cardiovascular:  RRR without M,G,R  Gastrointestinal: soft and non-tender; with positive bowel sounds. Musculoskeletal: warm without cyanosis. There is no lower extremity edema.   Skin:  no jaundice or rashes, no wounds   Neurologic: no gross neuro deficits     Psychiatric:  alert and oriented x 3    CXR:        Recent Labs     10/31/20  0139 10/30/20  1725   WBC 4.8 6.1   HGB 12.2 13.4   HCT 35.8 39.4    239     Recent Labs     11/01/20  0416 10/31/20  0139 10/30/20  1725    138 135*   K 4.0 2.9* 3.0*    101 98   * 85 104*   CO2 29 30 32   BUN 7 7 8   CREA 0.53* 0.58* 0.70   MG 2.4  --   --    PHOS 2.3*  --   --    CA 8.1* 7.3* 8.0*   ALB  --   --  2.7*   TBILI  --   --  0.7   ALT  --   --  25     Recent Labs     10/30/20  2136   PHI 7.52*   PCO2I 31.9*   PO2I 69*   HCO3I 25.9     Recent Labs     10/30/20  1725   LAC 1.5       Assessment:  (Medical Decision Making) Hospital Problems  Date Reviewed: 10/21/2020          Codes Class Noted POA    Pneumonia due to COVID-19 virus ICD-10-CM: U07.1, J12.89  ICD-9-CM: 480.8  11/1/2020 Unknown        * (Principal) Acute hypoxemic respiratory failure due to COVID-19 [facility]) ICD-10-CM: U07.1, J96.01  ICD-9-CM: 518.81, 079.89, 799.02  10/30/2020 Yes        COVID-19 ICD-10-CM: U07.1  ICD-9-CM: 079.89  10/30/2020 Yes        GLENYS (obstructive sleep apnea) (Chronic) ICD-10-CM: D93.93  ICD-9-CM: 327.23  7/21/2015 Yes        HTN (hypertension) (Chronic) ICD-10-CM: I10  ICD-9-CM: 401.9  12/14/2012 Yes    Overview Signed 12/17/2012  9:09 AM by Norberto Carrasco MD     The patient is stable on the current medications. Tolerating well. No sides effects. Will not adjust at this time. HLD (hyperlipidemia) (Chronic) ICD-10-CM: E78.5  ICD-9-CM: 272.4  12/14/2012 Yes    Overview Signed 12/17/2012  9:09 AM by Norberto Carrasco MD     The patient is stable on the current medications. Tolerating well. No sides effects. Will not adjust at this time. Fibromyalgia (Chronic) ICD-10-CM: M79.7  ICD-9-CM: 729.1  12/14/2012 Yes    Overview Signed 12/17/2012  9:10 AM by Norberto Carrasco MD     The patient is stable on the current medications. Tolerating well. No sides effects. Will not adjust at this time. RA (rheumatoid arthritis) ([facility]) (Chronic) ICD-10-CM: M06.9  ICD-9-CM: 714.0  12/14/2012 Yes    Overview Signed 12/17/2012  9:11 AM by Norberto Carrasco MD     The patient is stable on the current medications. Tolerating well. No sides effects. Will not adjust at this time. Followed by Dr. Zheng Barrios.                    Plan:  (Medical Decision Making)   1    Getting dexamethasone  2    Has had plasma but too late to give remdesivire  3    Continue airvo but move to icu for bipap if unable to adequately oxygenate  --    More than 50% of the time documented was spent in face-to-face contact with the patient and in the care of the patient on the floor/unit where the patient is located. Thank you very much for this referral.  We appreciate the opportunity to participate in this patient's care. Will follow along with above stated plan.     Etienne Romo MD

## 2020-11-01 NOTE — H&P (VIEW-ONLY)
CONSULT NOTE Radha Valdivia 11/1/2020 Date of Admission:  10/30/2020 The patient's chart is reviewed and the patient is discussed with the staff. Subjective:  
 
Patient is a 44 y.o.  female  with past medical history of hypertension, rheumatoid arthritis, obstructive sleep apnea on CPAP and depression/anxiety presented to the ED on 10/30 for worsening of shortness of breath.  Patient reports recent diagnosis of COVID-19 infection 8 days ago.  Symptoms started as fatigue, body aches and shortness of breath.  Patient reports worsening of exertional dyspnea for last 2 days and noted desaturations yesterday in 62s.  She is using CPAP at home and tried it with improvement in saturation.  Denies fever, chills, nausea, vomiting, abdominal pain or diarrhea. She did have loss of taste and smell. 
  
In the ED patient saturated in 80s on room air and was started on oxygen via nasal cannula requiring 6 to 8 L to maintain oxygen level above 92%.  Labs shows WBC 6.1, hemoglobin 13.4, potassium 3, ferritin 348, CRP 11.7 and lactic acid 1.5.  CXR shows diffuse bilateral small airspace consolidations characteristic of pneumonia from COVID-19 Following admission she has received dexamethasone and convalescent plasma but is  Too far out from dx to get remdesivire. She has required increase fio2 since admit and today she is on 94% airvo and sats are low 90s but do drop in the 70s with any movement. Review of Systems Constitutional: negative for fevers and chills Eyes: negative for visual disturbance Ears, nose, mouth, throat, and face: negative for sore throat Cardiovascular: negative for dyspnea Gastrointestinal: negative for nausea, vomiting and abdominal pain Genitourinary:positive for urinary incontinence Musculoskeletal:negative for arthralgias Neurological: negative for headaches Patient Active Problem List  
Diagnosis Code  
 HTN (hypertension) I10  
  HLD (hyperlipidemia) E78.5  Fibromyalgia M79.7  RA (rheumatoid arthritis) ([facility]) M06.9  Chronic fatigue R53.82  
 H/O Clostridium difficile infection Z86.19  
 Recurrent UTI N39.0  Asthma J45.909  Depression F32.9  Menopausal state N95.1  Malaise and fatigue R53.81, R53.83  
 GERD (gastroesophageal reflux disease) K21.9  Morbid obesity ([facility]) E66.01  
 GLENYS (obstructive sleep apnea) G47.33  
 Persistent disorder of initiating or maintaining sleep G47.00  
 Urgency of urination R39.15  Dyslipidemia E78.5  Essential hypertension I10  
 Palpitations R00.2  Allergic rhinitis J30.9  Eczema L30.9  Edema R60.9  Abnormal liver function tests R94.5  Essential tremor G25.0  Irritable bowel syndrome K58.9  Lateral epicondylitis M77.10  
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  
 PTSD (post-traumatic stress disorder) F43.10  
 Osteoarthritis M19.90  Obesity, morbid (more than 100 lbs over ideal weight or BMI > 40) ([facility]) E66.01  
 Kidney stones N20.0  Heart palpitations R00.2  Diabetes ([address] E11.9  Cystitis N30.90  Chronic pain G89.29  
 ADHD (attention deficit hyperactivity disorder) F90.9  Incisional hernia K43.2  Acute hypoxemic respiratory failure due to COVID-19 ([facility]) U07.1, J96.01  
 COVID-19 U07.1  Pneumonia due to COVID-19 virus U07.1, J12.89 Prior to Admission Medications Prescriptions Last Dose Informant Patient Reported? Taking? BD Tuberculin Syringe 1 mL 25 gauge x 5/8\" syrg   Yes No  
Cholecalciferol, Vitamin D3, 50,000 unit cap 10/30/2020 at Unknown time  Yes Yes Sig: Take  by mouth. HYDROcodone-acetaminophen (NORCO) 7.5-325 mg per tablet 10/30/2020 at Unknown time  No Yes Sig: Take 1 Tab by mouth every six (6) hours as needed for Pain. Max Daily Amount: 4 Tabs. Myrbetriq 50 mg ER tablet Not Taking at Unknown time  No No  
Sig: Take 1 Tab by mouth daily. albuterol (Ventolin HFA) 90 mcg/actuation inhaler 2020 at Unknown time  No Yes Sig: Take 2 Puffs by inhalation every six (6) hours as needed for Wheezing. atorvastatin (LIPITOR) 40 mg tablet 10/30/2020 at Unknown time  Yes Yes  
certolizumab pegoL (Cimzia Starter Kit) 400 mg/2 mL (200 mg/mL x 2) sykt injection 2020 at Unknown time  Yes Yes Si mg by SubCUTAneous route. cpap machine kit 10/30/2020 at Unknown time  Yes Yes Sig: by Does Not Apply route. 10 cm  
cyanocobalamin (VITAMIN B12) 1,000 mcg/mL injection 10/24/2020 at Unknown time  No Yes Si mL by IntraMUSCular route every seven (7) days. diclofenac (Voltaren) 1 % gel 2020 at Unknown time  Yes Yes Sig: apply 2-4 grams to affected area  
diphenoxylate-atropine (LomotiL) 2.5-0.025 mg per tablet 2020 at Unknown time  No Yes Sig: Take 1 Tab by mouth four (4) times daily as needed for Diarrhea. Max Daily Amount: 4 Tabs.  
ergocalciferol (Drisdol) 1,250 mcg (50,000 unit) capsule Not Taking at Unknown time  Yes No  
Si capsule  
estradiol (ESTRACE) 1 mg tablet 10/30/2020 at Unknown time  Yes Yes  
fluticasone furoate-vilanteroL (Breo Ellipta) 200-25 mcg/dose inhaler Not Taking at Unknown time  No No  
Sig: Take 1 Puff by inhalation daily. 1 inhalation each day instead of advair or symbicort) rinse gargle and swallow after using  
fluticasone propionate (Flonase) 50 mcg/actuation nasal spray 10/30/2020 at Unknown time  No Yes Si Sprays by Both Nostrils route daily. folic acid (FOLVITE) 1 mg tablet 10/30/2020 at Unknown time  Yes Yes  
furosemide (Lasix) 40 mg tablet 10/24/2020 at Unknown time  No Yes Si tablet hydroCHLOROthiazide (HYDRODIURIL) 25 mg tablet 10/30/2020 at Unknown time  Yes Yes Sig: Take 25 mg by mouth. hyoscyamine sulfate (NuLev) 0.125 mg tablet 10/29/2020 at Unknown time  No Yes Si-2 po QID prn abdominal cramping and pain  
methen-sod phos-meth blue-hyos (UROGESIC-BLUE) 81.6-40.8-0.12 mg tab 2020 at Unknown time  No Yes Sig: Take 1 Cap by mouth every six (6) hours as needed for Pain. methotrexate, PF, 25 mg/mL injection 10/24/2020 at Unknown time  Yes Yes Si.8ml  
mometasone (ELOCON) 0.1 % topical cream 10/24/2020 at Unknown time  Yes Yes Sig: Apply  to affected area. nabumetone (RELAFEN) 750 mg tablet 10/30/2020 at Unknown time  Yes Yes Sig: two (2) times a day. omeprazole (PRILOSEC) 20 mg capsule 10/30/2020 at Unknown time  No Yes Si po bid Before eating for stomach  
phentermine (ADIPEX-P) 37.5 mg tablet Not Taking at Unknown time  No No  
Sig: Take 1 Tab by mouth every morning. Max Daily Amount: 37.5 mg.  
potassium chloride (K-DUR, KLOR-CON) 20 mEq tablet Not Taking at Unknown time  No No  
Si po qd for potassium  
potassium chloride (KLOR-CON) 10 mEq tablet 10/30/2020 at Unknown time  No Yes Sig: Take 1 Tab by mouth daily. promethazine (PHENERGAN) 25 mg tablet 10/30/2020 at Unknown time  No Yes Sig: TAKE ONE TABLET BY MOUTH EVERY 6 HOURS AS NEEDED FOR NAUSEA  
propranolol LA (INDERAL LA) 120 mg SR capsule 10/30/2020 at Unknown time  Yes Yes Sig: nightly. Indications: palpitations  
venlafaxine-SR (EFFEXOR-XR) 150 mg capsule 10/30/2020 at Unknown time  Yes Yes  
zolpidem (Ambien) 10 mg tablet 10/30/2020 at Unknown time  No Yes Sig: Take 1 Tab by mouth nightly as needed for Sleep. Max Daily Amount: 10 mg. Facility-Administered Medications: None Past Medical History:  
Diagnosis Date  ADHD (attention deficit hyperactivity disorder)  Asthma MDI  Chronic fatigue  Chronic pain   
 generalized.  Cystitis hx; fermin s/p catheter  Depression  Diabetes ([facility] [address]  Fibromyalgia  GERD (gastroesophageal reflux disease) 3/11/2015  H/O Clostridium difficile infection 12/14/2012  H/O seasonal allergies 12/14/2012  Heart palpitations   
 controlled with med  HLD (hyperlipidemia) 12/14/2012  
 HTN (hypertension) 12/14/2012  Hypokalemia 12/14/2012  Kidney stones   
 hx  Obesity, morbid (more than 100 lbs over ideal weight or BMI > 40) ([facility]) BMI 48, HTN, need large BP cuff.  GLENYS (obstructive sleep apnea) 7/21/2015  Osteoarthritis   
 spine, hip  Persistent disorder of initiating or maintaining sleep 7/21/2015  Postablative ovarian failure 5/28/2020  PTSD (post-traumatic stress disorder)  RA (rheumatoid arthritis) ([facility] [address]  Recurrent UTI 12/14/2012  Unspecified adverse effect of anesthesia   
 had transient blindness s/p anesthesia in 2000; surgeries since with no problem Past Surgical History:  
Procedure Laterality Date [address]  HX CHOLECYSTECTOMY  2012  HX GASTRECTOMY  05/27/2015 Gastric Sleeve  HX HEENT [place] teeth removed [id number] [facility]  HX OOPHORECTOMY    
 large mass had the ovary removed  HX PARTIAL HYSTERECTOMY  2002  
 w/ unilateral oophorectomy  HX PELVIC LAPAROSCOPY    
 HX TONSILLECTOMY  US GUIDED CORE BREAST BIOPSY Left Social History Socioeconomic History  Marital status:  Spouse name: halina  Number of children: 3  
 Years of education: Not on file  Highest education level: Not on file Occupational History Comment: prior respiratory therapist  
Social Needs  Financial resource strain: Not on file  Food insecurity Worry: Not on file Inability: Not on file  Transportation needs Medical: Not on file Non-medical: Not on file Tobacco Use  Smoking status: Never Smoker  Smokeless tobacco: Never Used Substance and Sexual Activity  Alcohol use: Yes Comment: Wine with meals 1-2 x per month.  Drug use: No  
 Sexual activity: Yes  
  Partners: Male Lifestyle  Physical activity Days per week: Not on file Minutes per session: Not on file  Stress: Not on file Relationships  Social connections Talks on phone: Not on file Gets together: Not on file Attends Rastafarian service: Not on file Active member of club or organization: Not on file Attends meetings of clubs or organizations: Not on file Relationship status: Not on file  Intimate partner violence Fear of current or ex partner: Not on file Emotionally abused: Not on file Physically abused: Not on file Forced sexual activity: Not on file Other Topics Concern  Not on file Social History Narrative  Not on file Family History Problem Relation Age of Onset  Diabetes Mother  Heart Disease Mother  Hypertension Mother  Diabetes Father  Heart Disease Father  Heart Failure Father  Hypertension Father  Kidney Disease Sister  Breast Cancer Maternal Grandmother 77  Hypertension Maternal Grandmother  Hypertension Paternal Grandmother Allergies Allergen Reactions  Codeine Palpitations  Flagyl [Metronidazole] Hives  Keflex [Cephalexin] Palpitations and Rash Other reaction(s): Rash-A 
CDiff  Levaquin [Levofloxacin] Hives Other reaction(s): Hallucinations-I  
 Lyrica [Pregabalin] Swelling  Macrobid [Nitrofurantoin Monohyd/M-Cryst] Hives Other reaction(s): Hives/Swelling-A, Rash-A  
 Neurontin [Gabapentin] Swelling  Other Medication Anaphylaxis RUTUSS allergy - dizziness  Penicillins Swelling Other reaction(s): Anaphylactic shock-A  Percocet [Oxycodone-Acetaminophen] Swelling Other reaction(s): Hives/Swelling-A, Rash-A  
 Robitussin A-C [Codeine-Guaifenesin] Rash  Sulfa (Sulfonamide Antibiotics) Rash Other reaction(s): Unknown  Tetanus Toxoid Unknown (comments) Other reaction(s): Unknown  Tetanus Vaccines And Toxoid Swelling  Tussin  [Dextromethorphan Hbr] Unknown (comments)  Tussin Dm Cough Medicine Unknown (comments)  Vancomycin Other (comments) Other reaction(s): Hives/Swelling-A \" Inability to walk\" & Edema  Whey Other (comments) \"blisters in my esophagus and stomach\"  Xyzal [Levocetirizine] Other (comments) Made her mean and irritable Current Facility-Administered Medications Medication Dose Route Frequency  propranolol LA (INDERAL LA) capsule 120 mg  120 mg Oral QHS  [START ON 11/2/2020] dexAMETHasone (DECADRON) tablet 6 mg  6 mg Oral DAILY  LORazepam (ATIVAN) tablet 0.5 mg  0.5 mg Oral Q6H PRN  
 atorvastatin (LIPITOR) tablet 40 mg  40 mg Oral QHS  [Held by provider] furosemide (LASIX) tablet 40 mg  40 mg Oral DAILY  [Held by provider] hydroCHLOROthiazide (HYDRODIURIL) tablet 25 mg  25 mg Oral DAILY  pantoprazole (PROTONIX) tablet 40 mg  40 mg Oral ACB  potassium chloride (K-DUR, KLOR-CON) SR tablet 20 mEq  20 mEq Oral DAILY  venlafaxine-SR (EFFEXOR-XR) capsule 150 mg  150 mg Oral DAILY WITH BREAKFAST  zolpidem (AMBIEN) tablet 10 mg  10 mg Oral QHS PRN  
 NUTRITIONAL SUPPORT ELECTROLYTE PRN ORDERS   Does Not Apply PRN  
 guaiFENesin ER (MUCINEX) tablet 1,200 mg  1,200 mg Oral BID  doxycycline (VIBRAMYCIN) capsule 100 mg  100 mg Oral Q12H  
 HYDROcodone-acetaminophen (NORCO) 5-325 mg per tablet 1 Tab  1 Tab Oral Q4H PRN  
 HYDROcodone-acetaminophen (NORCO)  mg tablet 1 Tab  1 Tab Oral Q4H PRN  
 budesonide-formoterol (SYMBICORT) 80-4.5 mcg inhaler  2 Puff Inhalation BID RT  
 albuterol (PROVENTIL HFA, VENTOLIN HFA, PROAIR HFA) inhaler 2 Puff  2 Puff Inhalation Q4H PRN  
 alum-mag hydroxide-simeth (MYLANTA) oral suspension 30 mL  30 mL Oral Q4H PRN  
  sodium chloride (NS) flush 5-40 mL  5-40 mL IntraVENous Q8H  
 sodium chloride (NS) flush 5-40 mL  5-40 mL IntraVENous PRN  
 acetaminophen (TYLENOL) tablet 650 mg  650 mg Oral Q6H PRN Or  
 acetaminophen (TYLENOL) suppository 650 mg  650 mg Rectal Q6H PRN  polyethylene glycol (MIRALAX) packet 17 g  17 g Oral DAILY PRN  promethazine (PHENERGAN) tablet 12.5 mg  12.5 mg Oral Q6H PRN Or  
 ondansetron (ZOFRAN) injection 4 mg  4 mg IntraVENous Q6H PRN  
 enoxaparin (LOVENOX) injection 40 mg  40 mg SubCUTAneous Q12H  
 0.9% sodium chloride infusion 250 mL  250 mL IntraVENous PRN  
 ascorbic acid (vitamin C) (VITAMIN C) tablet 250 mg  250 mg Oral BID  zinc sulfate tablet 220 mg  220 mg Oral DAILY Objective:  
 
Vitals:  
 11/01/20 0721 11/01/20 0730 11/01/20 0810 11/01/20 1159 BP:   111/69 106/69 Pulse:   67 84 Resp:   (!) 32 28 Temp:   97.6 °F (36.4 °C) 97.5 °F (36.4 °C) SpO2: 94% 94% 94% 100% Weight:      
Height: PHYSICAL EXAM  
 
Constitutional:  the patient is well developed and in no acute distress EENMT:  Sclera clear, pupils equal, oral mucosa moist 
Respiratory:  Bilateral crackle Cardiovascular:  RRR without M,G,R 
Gastrointestinal: soft and non-tender; with positive bowel sounds. Musculoskeletal: warm without cyanosis. There is no lower extremity edema. Skin:  no jaundice or rashes, no wounds Neurologic: no gross neuro deficits Psychiatric:  alert and oriented x 3 CXR:   
 
 
Recent Labs 10/31/20 
0139 10/30/20 
1725 WBC 4.8 6.1 HGB 12.2 13.4 HCT 35.8 39.4  239 Recent Labs 11/01/20 
0416 10/31/20 
0139 10/30/20 
1725  138 135* K 4.0 2.9* 3.0*  
 101 98 * 85 104* CO2 29 30 32 BUN 7 7 8 CREA 0.53* 0.58* 0.70 MG 2.4  --   --   
PHOS 2.3*  --   --   
CA 8.1* 7.3* 8.0* ALB  --   --  2.7* TBILI  --   --  0.7 ALT  --   --  25 Recent Labs 10/30/20 
2136 PHI 7.52* PCO2I 31.9*  
 PO2I 69* HCO3I 25.9 Recent Labs 10/30/20 
1725 LAC 1.5 Assessment:  (Medical Decision Making) Hospital Problems  Date Reviewed: 10/21/2020 Codes Class Noted POA Pneumonia due to COVID-19 virus ICD-10-CM: U07.1, J12.89 ICD-9-CM: 480.8  11/1/2020 Unknown * (Principal) Acute hypoxemic respiratory failure due to COVID-19 [facility]) ICD-10-CM: U07.1, J96.01 
ICD-9-CM: 518.81, 079.89, 799.02  10/30/2020 Yes COVID-19 ICD-10-CM: U07.1 ICD-9-CM: 079.89  10/30/2020 Yes GLENYS (obstructive sleep apnea) (Chronic) ICD-10-CM: G47.33 
ICD-9-CM: 327.23  7/21/2015 Yes HTN (hypertension) (Chronic) ICD-10-CM: I10 
ICD-9-CM: 401.9  12/14/2012 Yes Overview Signed 12/17/2012  9:09 AM by Luz Gill MD  
  The patient is stable on the current medications. Tolerating well. No sides effects. Will not adjust at this time. HLD (hyperlipidemia) (Chronic) ICD-10-CM: F40.5 ICD-9-CM: 272.4  12/14/2012 Yes Overview Signed 12/17/2012  9:09 AM by Luz Gill MD  
  The patient is stable on the current medications. Tolerating well. No sides effects. Will not adjust at this time. Fibromyalgia (Chronic) ICD-10-CM: M79.7 ICD-9-CM: 729.1  12/14/2012 Yes Overview Signed 12/17/2012  9:10 AM by Luz Gill MD  
  The patient is stable on the current medications. Tolerating well. No sides effects. Will not adjust at this time. RA (rheumatoid arthritis) (HCC) (Chronic) ICD-10-CM: M06.9 ICD-9-CM: 714.0  12/14/2012 Yes Overview Signed 12/17/2012  9:11 AM by Luz Gill MD  
  The patient is stable on the current medications. Tolerating well. No sides effects. Will not adjust at this time. Followed by Dr. Vania Escobar. Plan:  (Medical Decision Making) 1    Getting dexamethasone 2    Has had plasma but too late to give remdesivire 3    Continue airvo but move to icu for bipap if unable to adequately oxygenate 
-- 
 
More than 50% of the time documented was spent in face-to-face contact with the patient and in the care of the patient on the floor/unit where the patient is located. Thank you very much for this referral.  We appreciate the opportunity to participate in this patient's care. Will follow along with above stated plan.  
 
Keshawn Hernández MD

## 2020-11-01 NOTE — PROGRESS NOTES
CM made several attempt to make contact with patient via phone. Staff states that patient 02 level is high and she's not feeling much like talking. CM screen patient medical chart. CM will continue to monitor and follow for any needs or concerns that may occur. Care Management Interventions PCP Verified by CM: Yes(Cassie Monroy MD) Mode of Transport at Discharge: Other (see comment) Transition of Care Consult (CM Consult): Discharge Planning Discharge Durable Medical Equipment: No 
Physical Therapy Consult: No 
Occupational Therapy Consult: No 
Speech Therapy Consult: No 
Current Support Network: Own Home, Lives with Spouse Confirm Follow Up Transport: Family The Patient and/or Patient Representative was Provided with a Choice of Provider and Agrees with the Discharge Plan?: No 
Freedom of Choice List was Provided with Basic Dialogue that Supports the Patient's Individualized Plan of Care/Goals, Treatment Preferences and Shares the Quality Data Associated with the Providers?: No 
Discharge Location Discharge Placement: Unable to determine at this time

## 2020-11-01 NOTE — PROGRESS NOTES
Patient is sleeping  Respirations 24/min  Slightly labored  Continue airvo 50L 95%  Oxygen saturation is 100% at present

## 2020-11-01 NOTE — PROGRESS NOTES
Critical Care Outreach Nurse Progress Report: 
 
Subjective: Chart reviewed secondary to MD concern - increasing O2 demands. MEWS Score: 3 (11/01/20 0810) Vitals:  
 10/31/20 2337 11/01/20 0409 11/01/20 0721 11/01/20 9929 BP: 109/65 121/64  111/69 Pulse: 72 67  67 Resp: 22 20  (!) 32 Temp: 97.5 °F (36.4 °C) 97.6 °F (36.4 °C)  97.6 °F (36.4 °C) SpO2: 93% 96% 94% 94% Weight:      
Height:      
  
 
LAB DATA: 
 
Recent Labs 11/01/20 
0416 10/31/20 
0139 10/30/20 
1725  138 135* K 4.0 2.9* 3.0*  
 101 98 CO2 29 30 32 AGAP 5* 7 5* * 85 104* BUN 7 7 8 CREA 0.53* 0.58* 0.70 GFRAA >60 >60 >60 GFRNA >60 >60 >60  
CA 8.1* 7.3* 8.0*  
MG 2.4  --   --   
PHOS 2.3*  --   --   
ALB  --   --  2.7* TP  --   --  7.0  
GLOB  --   --  4.3* AGRAT  --   --  0.6* ALT  --   --  25 Recent Labs 10/31/20 
0139 10/30/20 
1725 WBC 4.8 6.1 HGB 12.2 13.4 HCT 35.8 39.4  239 Objective:  
 
Pain Intensity 1: 0 (10/31/20 2100) Pain Location 1: Back Pain Intervention(s) 1: Medication (see MAR) Patient Stated Pain Goal: 6 Assessment: Chart reviewed, including VS, labs, and progress notes. Patient admitted with COVID-19 pneumonia on 10/30. O2 demands have increased from 8L HFNC on admission to 50L 100% FiO2 on AirVo. Currently maintaining SaO2 of 94%. Plan: Will continue to follow per outreach protocol.

## 2020-11-02 NOTE — PROGRESS NOTES
Pt. Alfa Ashley on CPAP at this time. No distress is noted. 11/01/20 2112 Oxygen Therapy O2 Sat (%) 95 % Pulse via Oximetry 76 beats per minute O2 Device CPAP nasal  
O2 Flow Rate (L/min) 15 l/min Respiratory Respiratory (WDL) X Respiratory Pattern Dyspnea at rest  
Chest/Tracheal Assessment Chest expansion, symmetrical  
CPAP/BIPAP  
CPAP/BIPAP Start/Stop On Device Mode CPAP, auto-titrating  
$$ CPAP Daily Yes Mask Type and Size Nasal mask Skin Condition intact Total RR (Spontaneous) 19 breaths per minute Leak (Estimated) (mask fit well) Pt's Home Machine No  
Biomedical Check Performed Yes Settings Verified Yes Alarm Settings Apnea 20 Pulmonary Toilet Pulmonary Toilet H. O.B elevated

## 2020-11-02 NOTE — PROGRESS NOTES
Pt wearing airvo 100% and nrb, sat 93% Dr Ronn Lenz aware.  Will assess and order  transfer to ICU

## 2020-11-02 NOTE — PROGRESS NOTES
TRANSFER - IN REPORT: 
 
Verbal report received from Bo(name) on [place] Boy  being received from 807(unit) for routine progression of care Report consisted of patients Situation, Background, Assessment and  
Recommendations(SBAR). Information from the following report(s) SBAR was reviewed with the receiving nurse. Opportunity for questions and clarification was provided. Assessment completed upon patients arrival to unit and care assumed.

## 2020-11-02 NOTE — PROGRESS NOTES
Patient progressively more and more confused throughout day. RR remains 40-50. PRN anxiety medication given and Precedex gtt started. RT to get ABG. MD made aware.

## 2020-11-02 NOTE — PROGRESS NOTES
Date of Outreach Update: 
Gerald Dill was seen and assessed. MEWS Score: 2 (11/01/20 1600) Vitals:  
 11/01/20 2112 11/01/20 2117 11/01/20 2339 11/02/20 0501 BP:  112/65 96/79 Pulse:  84 85 69 Resp:  22 25 Temp:  97.6 °F (36.4 °C) (!) 96.3 °F (35.7 °C) SpO2: 95% 95% 100% 100% Weight:      
Height:      
  
 
 Pain Assessment Pain Intensity 1: 0 (11/01/20 0730) Pain Location 1: Back Pain Intervention(s) 1: Medication (see MAR) Patient Stated Pain Goal: 0 Previous Outreach assessment has been reviewed. There have been no significant clinical changes since the completion of the last dated Outreach assessment. VS, labs, and progress notes reviewed. Pt O2 level and respiratory status remains stable on CPAP overnight. Will continue to follow up per outreach protocol. Signed By:   Lulú Nath RN   November 2, 2020 5:12 AM

## 2020-11-02 NOTE — ACP (ADVANCE CARE PLANNING)
ACP: No LW/HCPOA on file currently. Legal NOK, spouse, Jasmin Fine -[phone] if pt can not make decisions for self unless document presented stating otherwise.

## 2020-11-02 NOTE — PROGRESS NOTES
Pt not maintaining O2 sat on  Airvo, request to go back on CPAP. RT placed pt back on CPAP O2 sat up to 98%. Pt given PO Ativan.

## 2020-11-02 NOTE — PROGRESS NOTES
Date of Outreach Update: 
Julio Johnson was seen and assessed. MEWS Score: 2 (11/01/20 1600) Vitals:  
 11/01/20 1939 11/01/20 2112 11/01/20 2117 11/01/20 2339 BP:   112/65 96/79 Pulse:   84 85 Resp:   22 25 Temp:   97.6 °F (36.4 °C) (!) 96.3 °F (35.7 °C) SpO2: 94% 95% 95% 100% Weight:      
Height:      
  
 
 Pain Assessment Pain Intensity 1: 0 (11/01/20 0730) Pain Location 1: Back Pain Intervention(s) 1: Medication (see MAR) Patient Stated Pain Goal: 0 Previous Outreach assessment has been reviewed. There have been no significant clinical changes since the completion of the last dated Outreach assessment. VS, labs, and progress notes reviewed. Pt recently placed on CPAP by RT. Pt resting comfortably in bed, AOx4. Respirations unlabored, lungs diminished bilaterally. Will continue to follow up per outreach protocol. Signed By:   Bubba Schilling RN   November 2, 2020 1:15 AM

## 2020-11-02 NOTE — PROGRESS NOTES
Patient calls again with panic attack  Ativan . 5mg po  Sats in low 80s  Sat to upper 90s with controled breathing  Stayed with patient 30 mins until panic subsided  Fan placed in room per patient request for comfort

## 2020-11-02 NOTE — PROGRESS NOTES
Hospitalist Progress Note Admit Date:  10/30/2020  5:42 PM  
Name:  Aishwarya Ross Age:  44 y.o. 
:  1970 MRN:  [id number] PCP:  Faiza Gutierres MD 
Treatment Team: Attending Provider: Ayah Nuno MD; Consulting Provider: Aniceto Young MD; Primary Nurse: Glenda Fernandez; Consulting Provider: Ayah Nuno MD; Consulting Provider: Cassie Funes MD; Consulting Provider: Taisha Lazo MD; Primary Nurse: Bettie Velazco RN Subjective:  
68-year-old female with past medical history significant for hypertension, rheumatoid arthritis, obstructive sleep apnea on CPAP and depression/anxiety presented to the ED for worsening of shortness of breath.  Patient reports recent diagnosis of COVID-19 infection 8 days ago.  Symptoms started as fatigue, body aches and shortness of breath.  Patient reports worsening of exertional dyspnea for last 2 days and noted desaturations yesterday in 62s.  She is using CPAP at home and tried it with improvement in saturation.  Denies fever, chills, nausea, vomiting, abdominal pain or diarrhea.  
  
In the ED patient saturated in 80s on room air and was started on oxygen via nasal cannula requiring 6 to 8 L to maintain oxygen level above 92%.  Labs shows WBC 6.1, hemoglobin 13.4, potassium 3, ferritin 348, CRP 11.7 and lactic acid 1.5.  CXR shows diffuse bilateral small airspace consolidations characteristic of pneumonia from COVID-19.  Hospitalist consulted for admission. 
  
not a candidate for Remdesivir due to being too far out from diagnosis to benefit from treatment. 10/30 plasma given 10/31 - pt on 9L.  SOB worse with exertion, feels OK at rest.  Some cough. No fevers.   
  
on airvo 50L 100% saturating 80-90% Encourage breathing through nose Self prone as tolerated Restart home propranolol Low dose ativan for anxiety 
pulm consulted  Move to covid ICU 
 On a non rebreather and airvo. Looks unwell. Tachypneic Nursing notes and chart reviewed. Review of Systems negative with exception of pertinent positives noted above. Objective:  
 
Patient Vitals for the past 24 hrs: 
 Temp Pulse Resp BP SpO2  
11/02/20 0754     93 % 11/02/20 0545 97.6 °F (36.4 °C) 85  125/67 99 % 11/02/20 0501  69   100 % 11/01/20 2339 (!) 96.3 °F (35.7 °C) 85 25 96/79 100 % 11/01/20 2117 97.6 °F (36.4 °C) 84 22 112/65 95 % 11/01/20 2112     95 % 11/01/20 1939     94 % 11/01/20 1600 97.7 °F (36.5 °C) 76 24 119/77 92 % 11/01/20 1159 97.5 °F (36.4 °C) 84 28 106/69 100 % Oxygen Therapy O2 Sat (%): 93 % (11/02/20 0754) Pulse via Oximetry: 85 beats per minute (11/02/20 0754) O2 Device: Heated; Hi flow nasal cannula (11/02/20 0754) O2 Flow Rate (L/min): 50 l/min (11/02/20 0754) O2 Temperature: 87.8 °F (31 °C) (11/01/20 1939) FIO2 (%): 95 % (11/02/20 0754) Intake/Output Summary (Last 24 hours) at 11/2/2020 9011 Last data filed at 11/2/2020 0501 Gross per 24 hour Intake  Output 300 ml Net -300 ml General:    Well nourished. Alert. Anxious. On arrival maxed out and nonrebreather. Tachypneic. Looks unwell. Mild to moderate distress. CV:   RRR. No murmur, rub, or gallop. Lungs:   Some crackles without wheezing. Abdomen:   Soft, nontender, nondistended. Extremities: Warm and dry. No cyanosis or edema. Skin:     No rashes or jaundice. Data Review: 
I have reviewed all labs, meds, telemetry events, and studies from the last 24 hours. Recent Results (from the past 24 hour(s)) CBC WITH AUTOMATED DIFF Collection Time: 11/02/20  6:10 AM  
Result Value Ref Range WBC 15.8 (H) 4.3 - 11.1 K/uL  
 RBC 3.93 (L) 4.05 - 5.2 M/uL  
 HGB 12.2 11.7 - 15.4 g/dL HCT 37.0 35.8 - 46.3 % MCV 94.1 79.6 - 97.8 FL  
 MCH 31.0 26.1 - 32.9 PG  
 MCHC 33.0 31.4 - 35.0 g/dL  
 RDW 14.0 11.9 - 14.6 % PLATELET 169 919 - 806 K/uL MPV 9.2 (L) 9.4 - 12.3 FL ABSOLUTE NRBC 0.00 0.0 - 0.2 K/uL  
 DF AUTOMATED NEUTROPHILS 84 (H) 43 - 78 % LYMPHOCYTES 9 (L) 13 - 44 % MONOCYTES 6 4.0 - 12.0 % EOSINOPHILS 0 (L) 0.5 - 7.8 % BASOPHILS 0 0.0 - 2.0 % IMMATURE GRANULOCYTES 1 0.0 - 5.0 %  
 ABS. NEUTROPHILS 13.3 (H) 1.7 - 8.2 K/UL  
 ABS. LYMPHOCYTES 1.5 0.5 - 4.6 K/UL  
 ABS. MONOCYTES 0.9 0.1 - 1.3 K/UL  
 ABS. EOSINOPHILS 0.0 0.0 - 0.8 K/UL  
 ABS. BASOPHILS 0.0 0.0 - 0.2 K/UL  
 ABS. IMM. GRANS. 0.1 0.0 - 0.5 K/UL METABOLIC PANEL, BASIC Collection Time: 11/02/20  6:10 AM  
Result Value Ref Range Sodium 139 138 - 145 mmol/L Potassium 3.7 3.5 - 5.1 mmol/L Chloride 104 98 - 107 mmol/L  
 CO2 28 21 - 32 mmol/L Anion gap 7 7 - 16 mmol/L Glucose 114 (H) 65 - 100 mg/dL BUN 12 6 - 23 MG/DL Creatinine 0.59 (L) 0.6 - 1.0 MG/DL  
 GFR est AA >60 >60 ml/min/1.73m2 GFR est non-AA >60 >60 ml/min/1.73m2 Calcium 8.2 (L) 8.3 - 10.4 MG/DL All Micro Results Procedure Component Value Units Date/Time CULTURE, BLOOD [[id number]] Collected:  10/30/20 1625 Order Status:  Completed Specimen:  Blood Updated:  11/01/20 7487 Special Requests: --     
  LEFT Antecubital 
  
  Culture result: NO GROWTH 2 DAYS     
 CULTURE, BLOOD [[id number]] Collected:  10/30/20 2431 Order Status:  Completed Specimen:  Blood Updated:  11/01/20 3502 Special Requests: --     
  LEFT Antecubital 
  
  Culture result: NO GROWTH 2 DAYS Current Meds: 
Current Facility-Administered Medications Medication Dose Route Frequency  propranolol LA (INDERAL LA) capsule 120 mg  120 mg Oral QHS  dexAMETHasone (DECADRON) tablet 6 mg  6 mg Oral DAILY  LORazepam (ATIVAN) tablet 0.5 mg  0.5 mg Oral Q6H PRN  
 atorvastatin (LIPITOR) tablet 40 mg  40 mg Oral QHS  [Held by provider] furosemide (LASIX) tablet 40 mg  40 mg Oral DAILY  [Held by provider] hydroCHLOROthiazide (HYDRODIURIL) tablet 25 mg  25 mg Oral DAILY  pantoprazole (PROTONIX) tablet 40 mg  40 mg Oral ACB  potassium chloride (K-DUR, KLOR-CON) SR tablet 20 mEq  20 mEq Oral DAILY  venlafaxine-SR (EFFEXOR-XR) capsule 150 mg  150 mg Oral DAILY WITH BREAKFAST  zolpidem (AMBIEN) tablet 10 mg  10 mg Oral QHS PRN  
 NUTRITIONAL SUPPORT ELECTROLYTE PRN ORDERS   Does Not Apply PRN  
 guaiFENesin ER (MUCINEX) tablet 1,200 mg  1,200 mg Oral BID  doxycycline (VIBRAMYCIN) capsule 100 mg  100 mg Oral Q12H  
 HYDROcodone-acetaminophen (NORCO) 5-325 mg per tablet 1 Tab  1 Tab Oral Q4H PRN  
 HYDROcodone-acetaminophen (NORCO)  mg tablet 1 Tab  1 Tab Oral Q4H PRN  
 budesonide-formoterol (SYMBICORT) 80-4.5 mcg inhaler  2 Puff Inhalation BID RT  
 albuterol (PROVENTIL HFA, VENTOLIN HFA, PROAIR HFA) inhaler 2 Puff  2 Puff Inhalation Q4H PRN  
 alum-mag hydroxide-simeth (MYLANTA) oral suspension 30 mL  30 mL Oral Q4H PRN  
 sodium chloride (NS) flush 5-40 mL  5-40 mL IntraVENous Q8H  
 sodium chloride (NS) flush 5-40 mL  5-40 mL IntraVENous PRN  
 acetaminophen (TYLENOL) tablet 650 mg  650 mg Oral Q6H PRN Or  
 acetaminophen (TYLENOL) suppository 650 mg  650 mg Rectal Q6H PRN  polyethylene glycol (MIRALAX) packet 17 g  17 g Oral DAILY PRN  promethazine (PHENERGAN) tablet 12.5 mg  12.5 mg Oral Q6H PRN Or  
 ondansetron (ZOFRAN) injection 4 mg  4 mg IntraVENous Q6H PRN  
 enoxaparin (LOVENOX) injection 40 mg  40 mg SubCUTAneous Q12H  
 0.9% sodium chloride infusion 250 mL  250 mL IntraVENous PRN  
 ascorbic acid (vitamin C) (VITAMIN C) tablet 250 mg  250 mg Oral BID  zinc sulfate tablet 220 mg  220 mg Oral DAILY Other Studies (last 24 hours): No results found. Assessment and Plan:  
 
Hospital Problems as of 11/2/2020 Date Reviewed: 10/21/2020 Codes Class Noted - Resolved POA Pneumonia due to COVID-19 virus ICD-10-CM: U07.1, J12.89 ICD-9-CM: 480.8  11/1/2020 - Present Unknown * (Principal) Acute hypoxemic respiratory failure due to COVID-19 [facility]) ICD-10-CM: U07.1, J96.01 
ICD-9-CM: 518.81, 079.89, 799.02  10/30/2020 - Present Yes COVID-19 ICD-10-CM: U07.1 ICD-9-CM: 079.89  10/30/2020 - Present Yes  
   
 GLENYS (obstructive sleep apnea) (Chronic) ICD-10-CM: M74.99 
ICD-9-CM: 327.23  7/21/2015 - Present Yes HTN (hypertension) (Chronic) ICD-10-CM: I10 
ICD-9-CM: 401.9  12/14/2012 - Present Yes Overview Signed 12/17/2012  9:09 AM by Edi Cruz MD  
  The patient is stable on the current medications. Tolerating well. No sides effects. Will not adjust at this time. HLD (hyperlipidemia) (Chronic) ICD-10-CM: T44.6 ICD-9-CM: 272.4  12/14/2012 - Present Yes Overview Signed 12/17/2012  9:09 AM by Edi Cruz MD  
  The patient is stable on the current medications. Tolerating well. No sides effects. Will not adjust at this time. Fibromyalgia (Chronic) ICD-10-CM: M79.7 ICD-9-CM: 729.1  12/14/2012 - Present Yes Overview Signed 12/17/2012  9:10 AM by Edi Cruz MD  
  The patient is stable on the current medications. Tolerating well. No sides effects. Will not adjust at this time. RA (rheumatoid arthritis) (HCC) (Chronic) ICD-10-CM: M06.9 ICD-9-CM: 714.0  12/14/2012 - Present Yes Overview Signed 12/17/2012  9:11 AM by Edi Cruz MD  
  The patient is stable on the current medications. Tolerating well. No sides effects. Will not adjust at this time. Followed by Dr. Rupesh Peng. PLAN:   
Acute hypoxic respiratory failure 2/2 COVID 
-decadron x10d 
-conv plasma ordered 
-cannot get remdesivir.  Too far out from dx 
-albuterol HFA 
-mucinex 
-incentive lm 
-mobilize when able 
-suppress cough with PRNs if excessive 
-has documented allergies to basically all antibiotics - has tolerated doxycycline in the past, doxy for 5 days 
-self prone as tolerated -upgrade to covid ICU due to [employer] out and non rebreather 
  
HTN 
-hold home meds as BP borderline, okay to give home propranolol  
-no IV morphine 
  
Neurogenic bladder 
-has kapadia in 
-self caths at home 
-high risk for infection 
  
Multiple drug allergies Needs to see allergist outpatient in order to verify allergies. Very high risk for clinical deterioration requiring intubation DC planning/Dispo: Home when able DVT ppx:  lovenox Signed: 
Maeve Vines MD

## 2020-11-02 NOTE — PROGRESS NOTES
Critical Care Daily Progress Note: 11/2/2020 Lea Fiore Admission Date: 10/30/2020 The patient's chart is reviewed and the patient is discussed with the staff. Patient is a 44 y.o.  female  with past medical history of hypertension, rheumatoid arthritis, obstructive sleep apnea on CPAP and depression/anxiety presented to the ED on 10/30 for worsening of shortness of breath.  Patient reports recent diagnosis of COVID-19 infection 8 days ago.  Symptoms started as fatigue, body aches and shortness of breath.  Patient reports worsening of exertional dyspnea for last 2 days and noted desaturations yesterday in 62s.  She is using CPAP at home and tried it with improvement in saturation.  Denies fever, chills, nausea, vomiting, abdominal pain or diarrhea. She did have loss of taste and smell. 
  
In the ED patient saturated in 80s on room air and was started on oxygen via nasal cannula requiring 6 to 8 L to maintain oxygen level above 92%.  Labs shows WBC 6.1, hemoglobin 13.4, potassium 3, ferritin 348, CRP 11.7 and lactic acid 1.5.  CXR shows diffuse bilateral small airspace consolidations characteristic of pneumonia from COVID-19 Following admission she has received dexamethasone and convalescent plasma but is  Too far out from dx to get remdesivire. She has required increase fio2 since admit and today she is on 94% airvo and sats are low 90s but do drop in the 70s with any movement. Subjective:  
 
Moved to ICU due to sob Now on BIPAP 12/6 cm with FIO2 90% and sat 98% Less anxious after she was given Ativan Current Facility-Administered Medications Medication Dose Route Frequency  LORazepam (ATIVAN) injection 1 mg  1 mg IntraVENous Q4H PRN  propranolol LA (INDERAL LA) capsule 120 mg  120 mg Oral QHS  dexAMETHasone (DECADRON) tablet 6 mg  6 mg Oral DAILY  LORazepam (ATIVAN) tablet 0.5 mg  0.5 mg Oral Q6H PRN  
  atorvastatin (LIPITOR) tablet 40 mg  40 mg Oral QHS  [Held by provider] furosemide (LASIX) tablet 40 mg  40 mg Oral DAILY  [Held by provider] hydroCHLOROthiazide (HYDRODIURIL) tablet 25 mg  25 mg Oral DAILY  pantoprazole (PROTONIX) tablet 40 mg  40 mg Oral ACB  potassium chloride (K-DUR, KLOR-CON) SR tablet 20 mEq  20 mEq Oral DAILY  venlafaxine-SR (EFFEXOR-XR) capsule 150 mg  150 mg Oral DAILY WITH BREAKFAST  zolpidem (AMBIEN) tablet 10 mg  10 mg Oral QHS PRN  
 NUTRITIONAL SUPPORT ELECTROLYTE PRN ORDERS   Does Not Apply PRN  
 guaiFENesin ER (MUCINEX) tablet 1,200 mg  1,200 mg Oral BID  doxycycline (VIBRAMYCIN) capsule 100 mg  100 mg Oral Q12H  
 HYDROcodone-acetaminophen (NORCO) 5-325 mg per tablet 1 Tab  1 Tab Oral Q4H PRN  
 HYDROcodone-acetaminophen (NORCO)  mg tablet 1 Tab  1 Tab Oral Q4H PRN  
 budesonide-formoterol (SYMBICORT) 80-4.5 mcg inhaler  2 Puff Inhalation BID RT  
 albuterol (PROVENTIL HFA, VENTOLIN HFA, PROAIR HFA) inhaler 2 Puff  2 Puff Inhalation Q4H PRN  
 alum-mag hydroxide-simeth (MYLANTA) oral suspension 30 mL  30 mL Oral Q4H PRN  
 sodium chloride (NS) flush 5-40 mL  5-40 mL IntraVENous Q8H  
 sodium chloride (NS) flush 5-40 mL  5-40 mL IntraVENous PRN  
 acetaminophen (TYLENOL) tablet 650 mg  650 mg Oral Q6H PRN Or  
 acetaminophen (TYLENOL) suppository 650 mg  650 mg Rectal Q6H PRN  polyethylene glycol (MIRALAX) packet 17 g  17 g Oral DAILY PRN  promethazine (PHENERGAN) tablet 12.5 mg  12.5 mg Oral Q6H PRN Or  
 ondansetron (ZOFRAN) injection 4 mg  4 mg IntraVENous Q6H PRN  
 enoxaparin (LOVENOX) injection 40 mg  40 mg SubCUTAneous Q12H  
 0.9% sodium chloride infusion 250 mL  250 mL IntraVENous PRN  
 ascorbic acid (vitamin C) (VITAMIN C) tablet 250 mg  250 mg Oral BID  zinc sulfate tablet 220 mg  220 mg Oral DAILY Review of Systems Unobtainable due to patient status. Objective:  
 
Vitals: 11/02/20 0501 11/02/20 0545 11/02/20 0754 11/02/20 3417 BP:  125/67  (!) 122/52 Pulse: 69 85  76 Resp:    (!) 48 Temp:  97.6 °F (36.4 °C)  97.8 °F (36.6 °C) SpO2: 100% 99% 93% 93% Weight:      
Height:      
 
 
 
Intake/Output Summary (Last 24 hours) at 11/2/2020 1029 Last data filed at 11/2/2020 0501 Gross per 24 hour Intake  Output 300 ml Net -300 ml Physical Exam:         
Constitutional:  the patient is well developed and in no acute distress EENMT:  Sclera clear, pupils equal, oral mucosa moist 
Respiratory: decreased BS in the bases Cardiovascular:  RRR without M,G,R 
Gastrointestinal: soft and non-tender; with positive bowel sounds. Musculoskeletal: warm without cyanosis. There is no lower extremity edema. Skin:  no jaundice or rashes, no wounds Neurologic: no gross neuro deficits Psychiatric:  alert and awake LINES: 
[place] DRIPS: 
none CXR:  
 
 
 
 
LAB No results for input(s): GLUCPOC in the last 72 hours. No lab exists for component: [place] Recent Labs 11/02/20 
0610 10/31/20 
0139 10/30/20 
1725 WBC 15.8* 4.8 6.1 HGB 12.2 12.2 13.4 HCT 37.0 35.8 39.4  216 239 Recent Labs 11/02/20 
3358 11/01/20 
0416 10/31/20 
0139 10/30/20 
1725  138 138 135* K 3.7 4.0 2.9* 3.0*  
 104 101 98 CO2 28 29 30 32 * 144* 85 104* BUN 12 7 7 8 CREA 0.59* 0.53* 0.58* 0.70 MG  --  2.4  --   --   
PHOS  --  2.3*  --   --   
CA 8.2* 8.1* 7.3* 8.0* ALB  --   --   --  2.7* TBILI  --   --   --  0.7 ALT  --   --   --  25 Recent Labs 10/30/20 
2136 PHI 7.52* PCO2I 31.9*  
PO2I 69* HCO3I 25.9 Recent Labs 10/30/20 
1725 LAC 1.5 Recent Labs 10/30/20 
2136 PHI 7.52* PCO2I 31.9*  
PO2I 69* HCO3I 25.9 Patient Active Problem List  
Diagnosis Code  
 HTN (hypertension) I10  
 HLD (hyperlipidemia) E78.5  Fibromyalgia M79.7  RA (rheumatoid arthritis) (HCC) M06.9  Chronic fatigue R53.82  
 H/O Clostridium difficile infection Z86.19  
 Recurrent UTI N39.0  Asthma J45.909  Depression F32.9  Menopausal state N95.1  Malaise and fatigue R53.81, R53.83  
 GERD (gastroesophageal reflux disease) K21.9  Morbid obesity ([facility]) E66.01  
 GLENYS (obstructive sleep apnea) G47.33  
 Persistent disorder of initiating or maintaining sleep G47.00  
 Urgency of urination R39.15  Dyslipidemia E78.5  Essential hypertension I10  
 Palpitations R00.2  Allergic rhinitis J30.9  Eczema L30.9  Edema R60.9  Abnormal liver function tests R94.5  Essential tremor G25.0  Irritable bowel syndrome K58.9  Lateral epicondylitis M77.10  
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  
 PTSD (post-traumatic stress disorder) F43.10  
 Osteoarthritis M19.90  Obesity, morbid (more than 100 lbs over ideal weight or BMI > 40) ([facility]) E66.01  
 Kidney stones N20.0  Heart palpitations R00.2  Diabetes ([address] E11.9  Cystitis N30.90  Chronic pain G89.29  
 ADHD (attention deficit hyperactivity disorder) F90.9  Incisional hernia K43.2  Acute hypoxemic respiratory failure due to COVID-19 ([facility]) U07.1, J96.01  
 COVID-19 U07.1  Pneumonia due to COVID-19 virus U07.1, J12.89 Assessment:  (Medical Decision Making) Hospital Problems  Date Reviewed: 10/21/2020 Codes Class Noted POA Pneumonia due to COVID-19 virus ICD-10-CM: U07.1, J12.89 ICD-9-CM: 480.8  11/1/2020 Unknown * (Principal) Acute hypoxemic respiratory failure due to COVID-19 [facility]) ICD-10-CM: U07.1, J96.01 
ICD-9-CM: 518.81, 079.89, 799.02  10/30/2020 Yes COVID-19 ICD-10-CM: U07.1 ICD-9-CM: 079.89  10/30/2020 Yes GLENYS (obstructive sleep apnea) (Chronic) ICD-10-CM: G47.33 
ICD-9-CM: 327.23  7/21/2015 Yes HTN (hypertension) (Chronic) ICD-10-CM: I10 
ICD-9-CM: 401.9  12/14/2012 Yes Overview Signed 12/17/2012  9:09 AM by Whit Lima MD  
  The patient is stable on the current medications. Tolerating well. No sides effects. Will not adjust at this time. HLD (hyperlipidemia) (Chronic) ICD-10-CM: Q76.7 ICD-9-CM: 272.4  12/14/2012 Yes Overview Signed 12/17/2012  9:09 AM by Whit Lima MD  
  The patient is stable on the current medications. Tolerating well. No sides effects. Will not adjust at this time. Fibromyalgia (Chronic) ICD-10-CM: M79.7 ICD-9-CM: 729.1  12/14/2012 Yes Overview Signed 12/17/2012  9:10 AM by Whit Lima MD  
  The patient is stable on the current medications. Tolerating well. No sides effects. Will not adjust at this time. RA (rheumatoid arthritis) (HCC) (Chronic) ICD-10-CM: M06.9 ICD-9-CM: 714.0  12/14/2012 Yes Overview Signed 12/17/2012  9:11 AM by Whit Lima MD  
  The patient is stable on the current medications. Tolerating well. No sides effects. Will not adjust at this time. Followed by Dr. Catalina Odell. Plan:  (Medical Decision Making) --adjust BIPAP 
--control anxiety with Ativan 
--Decadron for total 10 days 
--IV Protonix --Lovenox 40 mg Q 12 hrs 
--follow ABG and repeat LDH,CRP,Ferritin, IL-6 level in am 
 
Total time spent with patient is 35 min so far More than 50% of the time documented was spent in face-to-face contact with the patient and in the care of the patient on the floor/unit where the patient is located.  
 
Alesha Savage MD

## 2020-11-02 NOTE — PROGRESS NOTES
Patient received from 8th floor on Bipap. Patient taken on Bipap for a moment to drink and patient's sats dropped into the 60s and patient RR in 46s. Patient placed back on bipap 100% and now appears more comfortable. PRN medication given. Dual skin assessment performed with Jeyson OROSCO. Skin intact with no signs of breakdown. Patient's chest and face very red, patient states it is from anxiety. Patient is alert and oriented.

## 2020-11-02 NOTE — PROGRESS NOTES
Chart reviewed after tx from 5th positive covid to CCU/Covid postiive isolation. On non-rebreather and airvo. CM will continue to follow for any assist and d/c POC when stable.

## 2020-11-02 NOTE — PROGRESS NOTES
Date of Outreach Update: 
Trey Rose was seen and assessed. Nurse called for low O2 sats but upon entering room, patient's O2 sat 97% on AirVo 100%; however, while patient talking or sleeping, desats to ~84%. RT at bedside, lung sounds diminished, 2 puffs given from inhaler. Patient received ativan @ [id number] but still very anxious. 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 Very worried about intubation, explained to her that the next step would most likely be to try CPAP or BiPAP before that. Patient appears reassured, encouraged her to rest and try to keep herself calm, and to call nurse for anxiety or SOB. O2 sat currently 92% on AirVo. Updated hospitalist, asked if maybe patient could use CPAP overnight if he feels this is appropriate, waiting to hear back. Will continue to monitor. 2010: new orders for patient to wear CPAP at night per Hospitalist.  
 
 
MEWS Score: 2 (11/01/20 1600) Vitals:  
 11/01/20 0810 11/01/20 1159 11/01/20 1600 11/01/20 1939 BP: 111/69 106/69 119/77 Pulse: 67 84 76 Resp: (!) 32 28 24 Temp: 97.6 °F (36.4 °C) 97.5 °F (36.4 °C) 97.7 °F (36.5 °C) SpO2: 94% 100% 92% 94% Weight:      
Height:      
  
 
 Pain Assessment Pain Intensity 1: 0 (11/01/20 0730) Pain Location 1: Back Pain Intervention(s) 1: Medication (see MAR) Patient Stated Pain Goal: 0 Will continue to follow up per outreach protocol. Signed By:   Troy Bedolla RN   November 1, 2020 7:59 PM

## 2020-11-03 NOTE — INTERVAL H&P NOTE
Update History & Physical 
 
The Patient's History and Physical of November 3/2020, The procedure was reviewed with the patient and I examined the patient. There was no change . The surgical site was confirmed by the patient and me. Plan:  The risk, benefits, expected outcome, and alternative to the recommended procedure have been discussed with the patient. Patient understands and wants to proceed with the procedure.  
 
Electronically signed by Ailin Estevez MD on 11/3/2020 at 5:32 PM

## 2020-11-03 NOTE — PROCEDURES
Procedure Note: 
 
Indication: Acute hypoxemic Respiratory Failure Sedation : Versed 5 mg Fentanyl 100 mcg Etomidate 30 mg 
 
 
 Using glidescope MAC BLADE, Noted vocal cords and patient intubated with size  7 ETT. Noted Change in CO2 detector. Patient with B/L breath sounds on auscultation. No sounds noted over abdomen. Patient tolerated procedure Well no complications. Lip line for ETT was 25 cm. CXR ordered and reviewed in a good position. Estimated Blood loss: none Airam Sherman MD

## 2020-11-03 NOTE — PROGRESS NOTES
PICC Placement Note PRE-PROCEDURE VERIFICATION Correct Procedure: yes. Time out completed with assistant Rashaad Hager RN and all persons present in agreement with time out. Correct Site:  yes Temperature: Temp: 100 °F (37.8 °C), Temperature Source: Temp Source: Axillary Recent Labs 11/03/20 
4402 BUN 14  
CREA 0.55*  WBC 18.4* Allergies: Codeine; Flagyl [metronidazole]; Keflex [cephalexin]; Levaquin [levofloxacin]; Lyrica [pregabalin]; Macrobid [nitrofurantoin monohyd/m-cryst]; Neurontin [gabapentin]; Other medication; Penicillins; Percocet [oxycodone-acetaminophen]; Robitussin a-c [codeine-guaifenesin]; Sulfa (sulfonamide antibiotics); Tetanus toxoid; Tetanus vaccines and toxoid; Tussin  [dextromethorphan hbr]; Tussin dm cough medicine; Vancomycin; Whey; and Xyzal [levocetirizine] Education materials for [facility] Care given to patient or family. PROCEDURE DETAIL A triple lumen PICC line was started for TPN. The following documentation is in addition to the PICC properties in the lines/airways flowsheet : 
Lot #: [id number] 
xylocaine used: yes Mid-Arm Circumference: 31 (cm) Internal Catheter Length: 43 (cm) Internal Catheter Total Length: 45 (cm) Vein Selection for PICC:right basilic Central Line Bundle followed yes Complication Related to Insertion: none Both the insertion guidewire and ECG guidewire were removed intact all ports have positive blood return and were flush well with normal saline. The location of the tip of the PICC is verified using ECG technology. The tip is in the SVC per ECG reading. See image below. Line is okay to use: yes

## 2020-11-03 NOTE — PROGRESS NOTES
Guideline Guideline Number: [id number] Title: Management of the Patient with Mechanical Ventilation (including weaning) and ABCDE Bundle Effective Date:  03/00 Revised Date: 02/09, 03/10, 7/12, 5/13,                                  10/13, 8/14 Reviewed Date: 07/2015, 04/2016, 06/2017 I. Policy:  Management of the patient requiring mechanical ventilation, including readiness to wean and weaning protocol. The information provided serves as a guideline for patient management. Included in this guidelines is the ABCDE Bundle to provide guidance to staff for evidence based management of pain, agitation/anxiety and delirium in the intensive care unit. The goals of critical care analgesia and sedation are to facilitate mechanical ventilation, to prevent patient and caregiver injury, and to avoid the psychological and physiologic consequences of inadequate treatment of pain, anxiety, agitation, and delirium by maintaining a light level of sedation. Pain occurs commonly in adult ICU patients, regardless of admitting diagnosis. Therefore, pain should be frequently assessed and analgesic medications titrated to prevent adverse effects associated with either inadequate or excessive analgesia. Once pain has been addressed, anxiolytic and/or antipsychotic medications can be utilized to treat unresolved agitation/anxiety and delirium, with the goal to prevent over- or under sedation by using the Caruso Agitation Sedation Scale (RASS). Assertive management of these issues has been shown to reduce costs, improve ICU outcomes such as successful extubation and ICU length of stay, and allow for patients to participate in their own care. II. Purpose: The respiratory care practitioner and the critical care RN will utilize the following guideline to provide the most efficient and effective management of mechanical ventilators and weaning and extubation processes. Goals of Treatment: 1. Adequate management of patients pain and discomfort while maintaining a light level of                   sedation (RASS score of 0 to -1) 2. Both chronic and acute sources of pain should be identified and treated. 3. Sedative agents should be considered if patient still expresses discomfort and/or is not at RASS         goal of 0 to -1 despite adequate management of pain. 4. Patients requiring neuromuscular blockage must have continuous infusions of analgesic and              sedative agents. III. Responsibility: Director Respiratory Care Services and all Respiratory Care Practitioners  with documented competency as well as Critical Care RN staff. General Guidelines 1. Introduction to Ventilator Plan Phase 
a. Ventilator Management Phase, General Statement -  The plan should be initiated in patients who have a secure airway/require invasive mechanical ventilation (endotracheal or tracheostomy) only -   The provider determines the appropriate medications used for analgesia and agitation/anxiety along with the clinical pharmacist 
 
2. Monitoring Levels of Comfort 
a. Pain Assessment 
- Pain is monitored using the numerical scales - A pain assessment should be conducted, at a minimum, every 4 hours and as needed and per guidelines. - The level of pain should be determined as satisfactory by the patient based on patients baseline level of pain , considering any chronic pain that the patient may have. - If the patient is unable to communicate pain level, the nurse can assess for nonverbal indicators including facial grimacing, moaning, tachypnea, tachycardia, hypertension, diaphoresis, etc as a cue to begin further pain assessment. b.  Sedation Assessment - Sedation is monitored using the Caruso Agitation Sedation Scale (RASS) Target RASS RASS Description +4 Combative, violent, danger to staff 
  
+3 Pulls or removes tubes(s) or catheters; aggressive +2 Frequent nonpurposeful movement, fights ventilator +1 Anxious, apprehensive, but not aggressive  
0 Alert and calm  
-1 Awakens to voice (eye opening/contact) > 10 sec  
-2 Light sedation, briefly awakens to voice (eye opening/contact) < 10 sec  
-3 Moderate sedation, movement or eye opening. No eye contact  
-4 Deep sedation, no response to voice, but movement or eye opening to physical stimulation  
-5 Unarousable, no response to voice or physical stimulation  
 
-  Goal RASS is 0 to -1, unless otherwise specified by providers order. 
- Nursing staff should conduct the RASS every 4 hours and as needed to maintain goal RASS of 0 to -1. 
- If RASS is outside of goal range, discuss treatment options with provider. 
- A RASS score of +2 to +4 requires further assessment by the nurse. Causes of agitation/anxiety that should be considered include: 
a. Pulmonary -   endotracheal tube malposition or patency, mode of ventilation, pneumothorax, hypoxemia, hypercarbia 
b. Metabolic  hypoglycemia, hyponatremia, acute renal or hepatic failure 
c. Emotional upset  with information and awareness of critical condition, prognosis, need for surgical or invasive procedures, other interventions or complications, family or personal stressors 
- C. Sedation Assessment while using Neuromusclar Blocking Agents 
- D. Delirium Assessment 
a. the ICU ([facility]  ICU) 3. Analgesia The incidence of significant pain has been reported to be 50% or higher in both medical and surgical ICU patients. These patients also experience discomfort during routine/procedural ICU care and at rest.  However, patients may be unable to self-report their pain (either verbally or with other signs) because of an altered level of consciousness, the use of mechanical ventilation, or high doses of sedative agents or neuromuscular blocking agents.  
The short and long term consequences of unrelieved or inadequately treated pain are significant and include patient discomfort, decreased satisfaction with care by family and patient, delirium, agitation/anxiety, post traumatic stress disorder and depression. Therefore, routine assessment and treatment of pain should occur in all ICU patients. Causes and Treatment of Pain in the ICU 
a. Acute pain (post-operative, procedural pain, discomfort with usual ICU care or other acute episodes of pain-related to underlying disease) 1. Consider use of PCA for alert and oriented patients with pain needs not met by PRN dosing or opoids. 2. Preemptive analgesia should occur prior to chest tube removal, and should be considered for other procedural pain such as turning and repositioning, would drain removal, wound dressing change, tracheal suctioning, femoral catheter removal or place of central venous catheter. 3. Appropriate analgesic medications for preemptive analgesia are short acting intravenous (IV) agents (i.e. fentanyl, morphine, hydromorphone) a. Administration of analgesia before patient experiences noxious stimuli prevents amplification and hyperexcitability of the central nervous system. b. Analgesia for Mechanically Ventilated Patients: 
1. The approach to sedation and analgesia management for mechanically ventilated patients favors use of analgesia first sedation. The primary goal of this strategy is to address pain and discomfort first, and then if necessary, add anxiolytic agent. 2. Analgesia first sedation reduces dose escalation of medications, decreases the duration of mechanical ventilation and the incidence of VAP, improves the probability of successful extubation, and ultimately shortens ICU length of stay. 3. For pain management, analgesic medications are determined by the provider.    Intermittent dosing of the analgesic should be attempted first.   
If the patient requires more than 3 doses within 1 hour then provider should be contacted to consider continuous infusion. 4.  Analgesic options for mechanically ventilated patients include: 
a. Fentanyl which is considered the drug of choice for patients requiring continuous infusion. b.Morphine may be considered for those patients without renal dysfunction who are hemodynamically stable and require intermittent pain medication. Continuous infusions of morphine may be used for patientl who are receiving comfort care as part of end of life care. c.Hydromorphone is reserved for patients who are refractory to fentanyl or morphine and is typically admininstered by intermittent dosing. 4.  Agitation/Anxiety Background Agitation and anxiety frequently occur in ICU patients. Anxiolytic/sedation agents may be indicated to help relieve discomfort, improve synchrony with mechanical ventilation, and decrease the overall work of breathing. Pain control alone may be sufficient to make patients comfortable enough to require no anxiolytic/sedative agent. In addition, non-pharmacologic interventions such as repositioning or verbal assurance may be helpful to comfort or redirect an agitated patient. If these methods are unsuccessful, then anxiolytic/sedative medications such as propofol, dexmedetomidine, or benzodiazepines can be used. Selection of an anxiolytic should be based on the pharmacokinetic properties of the medication, patient specific characteristics, and sedation goal.   However, nonbenzodiazepine sedatives (ie propofol or dexmedetomidine) may be preferable over benzodiazepines (ie midazolam or larazepam) due to more favorable outcomes such as delirum. Causes and Treatment of Agitation/Anxiety 
a. Possible underlying causes of agitation and anxiety include pain, delirium, hypoxemia, hypoglycemia, hypotension, or withdrawal from alcohol  and other drugs.  
b. Analgesia first sedation should be attempted initially to manage pain and provide sedation in appropriate patients. Analgesia alone may be adequate to reach RASS goal of 0 to -1. If patient remains agitated or anxious despite adequate analgesia (ie RASS +2 to +4) then anxiolytic/sedative should be considered. c. The choice of anxiolytic should be based on desired levels of sedation (ie light sedation or deep sedation) with preference for the use of nonbenzodiazepines such as propofol or dexmedetomidine if appropriate. While light sedation (ie RASS 0 to -1) is preferred for most patients, there are instances when deep sedation (ie RASS -4 to -5) is desired. For example, in the setting of ventilator dysynchrony due to ARDS or for patients receiving NMB agents. d. Medications to maintain light sedation (ie RASS 0 to -1) include 1. Propofol continuous infusion can be considered for hemodynamically stable (ie SBP = 100, MAP = 65 and/or not requiring vasopressor support) patients requiring light sedation. Propofol has a quick onset (1-2 minutes) and offset of action, making it a good agent to assess neurological status and facilitate liberation from the mechanical ventilator. 2.Dexmedetomidine continuous infusion is a good option for hemodynamically stable patients requiring light sedation as it allows for a more awake, interactive patient is associated with less delirium. It has an intermediate onset of action (5-10 min). Therefore, abrupt titrations should be avoided, but use of prn haloperidol or benzodiazepine may be useful to manage agitation until the medication takes effect. 3. Antipsychotics are another option. In particular, haloperidol intermittently dosed may be useful for patients with symptoms of agitation/anxiety and delirium. 4.Benzodiazapines can also be considered for light sedation, but should be intermittently doses. Midazolam is an option for patients without renal dysfunction.   It has a short onset of action (2-5 minutes) making it a good agent for acute agitation/anxiety, but short duration of action resulting in frequent dosing. Lorazepam is another option. It has a longer onset of action (15-20 minutes) in comparison to midazolam, but longer duration of action. e. Medications to maintain deep sedation (RASS -4 to -5) include: 
1) Propofol continuous infusion should be considered as a first line option for hemodynamically stable patients. 2)Benzodiazepines can be considered as second line options for deep sedation. Studies comparing these agents to other sedatives have shown that they lead to worse outcomes including delirium, oversedation, delayed extubation, and longer time to discharge. Midazolam is one option for patients without renal dysfunction and lorazepam is another options. If patient requires more than 3 doses within 1 hour then contact provider  to consider initiation of continuous infusion. 5.  Daily Sedation Awakening Trial (SAT) from IV Continuous Analgesia/Sedation 
a. Patients are to have daily awakening from sedation while on continuous IV analgesia and/or sedation in the ICU. Continuous analgesia infusions may be maintained only if needed for active pain and RASS is at goal 0 - -1. Unit guideline is for the SAT to occur following ICP rounds each morning.   
b. The sedation awakening trial (SAT) is done regardless if the patient meets criteria for spontaneous breathing trial (SBT). c. SAT safety screen is assessed and SAT should not be performed if sedation is being used for active seizures, alcohol withdrawal, hemodynamically unstable or requiring support of vasoactive medications , in conjunction with NMB agents, if ICP is greater than 
20mmHg or if sedation is being used to control ICP, patients RASS is +3 or +4 (very agitated or combative).   Other exclusion criteria are:  if there is documentation of myocardial ischemia in the past 24 hours; or patient is receiving high frequency oscillator ventilation (HFOV) , if the patient has an open chest /abdomen or is receiving comfort care. d. Criteria for passing the SAT are the patient opened their eyes to verbal stimuli or tolerated sedative interruption without exhibiting failure criteria. 
e. Patients fail the SAT if the develop sustained anxiety, agitation, or pain; a respiratory rate of 35 per minutes for 5 minutes or longer, an SpO2 less than 88% for 5 minutes or longer; an acute cardiac dysrhythmia; two or more signs of respiratory distress including tachycardia, bradycardia; use of accessory muscles; diaphoresis; marked dyspnea; or myocardial ischemia. f. Respiratory therapy staff must verify with the nurse that continuous IV analgesia (unless being use  for active pain) and sedation (unless patient is receiving dexmedetomidine) is off prior to placing patient on SBT. g. DO NOT interrupt infusion of analgesia and sedation medications if patient is receiving neuromuscular blockade. 
h. Monitor level of wakefulness unless patient is awake and follows commands (RASS 0 to -1) or patient becomes uncomfortable or agitated (RASS +3 to +4) 
i. If agitation prevents successful awakening , administer bolus of analgesia and/or sedation then resume infusion of the medication at ½ previous dose and titrate as needed. 
j. If oversedation prevents successful awakening, hole infusion until at goal and resume ½ of prior infusion rate/dose if clinically indicated. 6. Delirium Background Delirium is characterized by the acute onset of cerebral dysfunction with a change or fluctuation baseline 
mental status, inattention, and either disorganized thinking or an altered level of consciousness. It affects up to 80% of mechanically ventilated adult ICU patients, and is associated with increased mortality,  
and treatment is important and may in turn allow for a patient to be conscious yet cooperative enough to participate in ventilator weaning trials and early mobilization efforts. Delirium can only be assessed in patients who are able to sufficiently interact and communicate with bedside 
clinicians (ie RASS -3 to +4). IV. Procedure: A. Assessment: The following criteria must be assessed prior to the initiation of weaning from mechanical ventilation. Note: The criteria are general guidelines and must be individualized for each patient. The patients primary nurse will be responsible, in coordination with the RT, the Spontaneous Awakening Trial). The RT will perform the SBT. B. Spontaneous Awakening Trials (SATs  also referred to as Sedation Vacation) and Spontaneous Breathing Trials (SBTs) performed to determine readiness to wean. 1. For patients who meet established criteria, such as those without active seizures, alcohol withdrawal and agitation, myocardial ischemia or those requiring cardiac support devices, without increased intracranial pressure and those not receiving neuromuscular blockade, the nurse will reduce the infusions of sedative by 50% of current used for sedation that was used to achieve a level of light sedation (Anne Score 2 or RASS score of 0 to -1) and evaluate patient response to reduction of sedation. Analgesics required for pain control are continued during the test.  Obtain MD order to cover no SAT for that time period if patient has any exclusion criteria as described above. 2. Failure of the spontaneous awakening trial occurs when the patient shows symptoms such as increased agitation, anxiety, pain or signs of respiratory distress including respiratory rate >35/min or oxygen saturation <88% as well as development of acute cardiac arrhythmias. If these symptoms develop during the SAT, the nurse then restarts sedation at 75% of the previous dose and titrates the medications until the patient is comfortable and/or symptoms have abated. 3.  If the patient passes the SAT then the patient moves on to the Spontaneous Breathing Trials as performed by the RT. The SBT Safety Screen included the following:  No agitation, oxygen saturation > 88%, FIO2 < 50%, PEEP < 7.5 cm H20, no myocardial ischemia, no vasopressor use, and with inspiratory efforts. 4. Patients who pass the spontaneous awakening trial but fail the spontaneous breathing trial are placed back on full ventilator support and reassessed the next day. 5. Failure of the SBT (spontaneous breathing trail) includes any of the following:  Respiratory rate > 35/min, respiratory rate < 8/min, oxygen saturation < 88%, respiratory distress, mental status change, acute cardiac arrhythmia. 6. Extubation is considered for patients who tolerate the spontaneous awakening trial and pass the spontaneous breathing trial.   
C. Can the cause of respiratory failure be reversed (i.e. absence of high spinal cord injury or advanced ALS)? D. Is gas exchange adequate? 1. PaO2/FIO2 ratio > 150  200, 2. PEEP < 8 cm H20 
3. FIO2 < 50 
4. pH > 7.30 
5. Rapid shallow breathing index (f/VT) < 105 
E. Is patient hemodynamically stable? 1. Absence of clinically significant hypotension (minimal vasopressors such as Dopamine < 5mcg/kg/minute)? F. Is there evidence of intact respiratory drive (NIP/NIF >-03 NLD64, stable VC02)? G. Does patient have an adequate cough, airway clearance ability? H. Is there absence of excessive secretions? V. Initiation: A. The therapist shall consult with RN to determine if sedation can be discontinued or significantly decreased. If this can be achieved, the therapist shall implement the  
                  followin. Identify patient and verify name and account number via ID bracelet. 2. Perform hand hygiene per hospital policy utilizing Standard Precautions for all patients and following transmission-based isolation as indicated per hospital policy. 3. Perform a ONE-MINUTE SPONTANEOUS TRIAL AND ASSESSMENT. 4. Measure Rapid Shallow Breathing Index (RSBI) and monitor SpO2 and cardiovascular parameters during the spontaneous breathing assessment. 5. If SpO2 and cardiovascular parameters are stable, continue spontaneous breathing trial for at least 30 minutes and up to 120 minutes, as patient tolerates. 6. Monitor ventilatory status, SpO2, and cardiovascular status during spontaneous breathing trial. 
7. If patient has a successful trial, consider patient as a candidate for extubation and obtain order. 8. If patient fails the weaning trial, place back on ventilator and adjust settings to provide a non-fatiguing form of ventilatory support for the remainder of the day and night. 9. One attempt at weaning shall be performed each day until successful weaning occurs. The RCP will make every attempt to begin the spontaneous breathing trials between 0500 and 0600 to provide documentation of the trial when the pulmonologist makes rounds. B.  Assessment of SBT or PST: 
1. Is gas exchange acceptable? 2. PaO2 > 60 mm Hg. 3. PH > 7.30 
4. Increase in PaCO2  < 10 mm Hg C. Is patient hemodynamically stable? 1. HR < 120 beats/minute 2. HR  < 20% 3. Systolic BP < 319 and > 90 mmHg 4. BP  < 20%, no vasopressors required D. Does patient have stable ventilatory pattern? 1. Sustained RR < 30 breaths per minute 2. Normal and stable VCO2 3. Patient is not demonstrating any signs of increased work of breathing, such as increased use of accessory muscles. E. Mental status stable throughout trial? 
1. Absence of changes such as somnolence, excessive agitation or anxiety 2. Absence of diaphoresis during trial? 
IV. Safety: A. The RCP shall monitor patient according to the above guidelines.  If at any time during the weaning process, the respiratory therapist or nurse feels that the patient is not tolerating weaning, the therapist shall place patient back on previous ventilator settings. B. The patient shall be reassessed and the weaning process should be continued the following day. V. Reportable Conditions: A. The therapist shall notify the physician, as appropriate, for any of the following         conditions: 1. FIO2 increase (sustained) at 10% or greater 2. Poor patient/ventilator interface in spite of adjustments 3. Need for increased sedation for respiratory distress 4. Need for increasing ventilating pressures (i.e. PEEP, PIP, MAP) 5. ABG results meeting panic value criteria or other clinical signs indicating deterioration of patients condition. 6. Unplanned extubation. 7. Unexplained sustained increase in PIP greater than 10 cm H2O. 
8. Assessment results regarding ventilator discontinuance process. VI. Ventilator protocol management A. The following items should be maintained for patients who are being mechanically           ventilated. 1. Obtain STAT Chest X-Ray for ET tube placement after insertion. 2. Chest X-Ray q a.m. while on ventilator. 3. ABGs 30 - 60 minutes after being stable on the ventilator. 4. ABG's q a.m. while on ventilator and prn. 
5. Do spontaneous breathing trials when patient is hemodynamically stable, responsive, and without fever. 6. Terminate trials if patient exhibits signs of respiratory distress. 7. Therapists should maintain ABG s as follows: 
    a. pH -  7.30 - 7.50              
    b. PaO2 -   60  100  
     8. Racemic Epinephrine (0.5cc) for post extubation stridor (2 UA treatments max.) 
 
VII. Early Mobilization Mobility Level Criteria Start at Level 1 if:  
PaO2/FIO2 <250 Positive end-expiratory Pressure (PEEP) >=10 cm H2O  
O2 saturation <90% Respiratory Rate (RR) Not within 10-30 per min Cardiac arrhythmias or ischemia New onset Heart Rate  (HR) <60 or >120 beats per min Mean arterial pressure (MAP) <55 or >140 mmHg Systolic blood pressure (SBP) <90 or > 180 mmHg Vasopressor infusion New or increasing Caruso Agitation Scale (RASS) < - 3 Level I:   Breathe  (Rass -5 to -3) HOB Angle  improve VAP protocol compliance ? Visually confirm the [facility] is elevated >= 30 degrees to comply with VAP prevention protocols ? The Centers for Disease Control and Prevention recommends an HOB angle of 30-45 degrees , unless contraindicated Additional activities to be implemented ? Every 2 hour turning ? Passive range of motion ? Up to 20 degrees reverse trendelenburg with lower extremity exercise/retracting footboard ? Continuous lateral rotation therapy can be considered part of early mobility therapy in patients who are at high risk for pulmonary complications Move to Level 2 when the patient 
- Has acceptable oxygenation/hemodynamics - Tolerates q 2 turning - Tolerates HOB > 30 degrees or up to 20 degrees reverse trendelenburg Level 2 :Tilt  Patient Assessment Rass > -3  (eg, opens eyes, may have profound weakness) Up to 20 degrees Reverse Trendelenburg position and 10 degrees minimum HOB 
- Reverse Trendelenburg positioning allows for orthostatic position in fragile patients - If available , use in conjunction with retracting foot section to allow for partial weight bearing prior to sitting up in the bed or getting out of bed Additional activities to be implemented -  Maintain HOB >/= 30 degrees - Q 2 hour turning - Passive/active range of motion - Legs dependent - PT consultation Move to Level 3 when the patient . Jose Daniel Newton -Tolerates active- assistance exercises 2 times per day 
  -Tolerates lower extremity exercises against footboard/Up to 20 degrees Reverse Trendelenburg 
  -Tolerates legs dependent /HOB 45 degrees Level 3 :  SIT  (Rass >- 1 (eg , weak but may move arms/legs independently) Full chair position (footboard on) ?  Full upright positioning allows for diaphragmatic excursion and lung expansion ? Sitting with legs in a dependent position facilitates gas exchange Additional activities to be implemented - Maintain HOB >= 30 degrees - Q 2 hour turning (assisted) - Active range of motion  PT/OT actively involved - Encourage activities of daily living 
- Dangling, if patient can move arm against gravity Move to Level 4 when the patient . Tonye Cogan - Tolerates increasing active exercise in bed - Actively assists with every- 2- hour turning or turns independently - Tolerates full chair position 3 times/day Level 4:  Stand ( RASS >0 (eg, weak but may tolerate increased activity) Stand Attempts ? Full chair position (footboard off/feet on the floor) ? Consider using a sit-to-stand lift ? Pivot to chair, it tolerates partial weight bearing Additional activities to be implemented - Maintain head of bed >= 30 degrees - Q 2 hr turning (self/assisted) - Active range of motion - Encourage activities of daily living 
- PT/OT actively involved Move to Level 5 when the patient . 
- Can successfully comply with all activities - Tolerates trial periods of full chair position (footboard off/feet on floor) 3 times per day - Tolerates partial weight-bearing stand and pivots to chair Level 5 :  Move  (RASS > 0    (eg, weak but may tolerate increased activity) Achieve out of bed ? Utilize mobile floor life to ambulate to bedside chair Additional activities to be implemented - Maintain HOB > = 30 degrees - Q 2 hour turning (self/assisted) - Active range of motion - Patient stands/bears weight > 1 minute - Patient marches in place 
- PT/OT actively involved Patient continues to ambulate progressively longer distances as tolerated until they consistently participate and move independently. E Approved by Critical Care Committee 2-19-09 [facility] Clinical Guidelines ABCDE DOC Flowsheet Content Variables to select when addressing section Comments ABCDE Initiated ? Yes/No  RN to address minimum q 24 hours (day shift) Target RASS ? 0 = alert and oriented ? -1 = drowsy ? -2 = light sedation ? -3= moderate sedation ? -4= deep sedation Target on standard ventilator setting should be -2; -4 with oscillator CAM -ICU ? Positive ? Negative ? Unable to assess Delirium assessment SAT Safety Screen Passed ? Yes 
? No Select yes if proceeding on to the sedation vacation (reduction of continuous sedative drip by directed by MD) Select no if your patient has any of the below reasons for not proceeding on to the daily awakening sedation vacation trial  
SAT Screen for Failure ? Active seizures ? Acute delirium tremors ? Agitation that threatens accidental line/tube removal 
? On paralytics ? MI (24-48hr) ? Abnormal ICP ? Open abdomen Select one of the options when the patient will not undergo the sedation vacation  ALSO MUST OBTAIN AN ORDER FOR no Jong Sarykamilah written under nursing miscellaneous for now by either the NP or Intensivist  
Daily sedation Vacation/assessment of  ? Yes 
? No 
? Not applicable If yes, MUST see the reduction in sedation on the [facility] and please place in the comment section of the sedative sedation vacation started SBT Safety Screen Passed ? Yes 
? No Select Yes if the patient has none of the below listed reasons for not proceeding on to the SBT following reduction of sedation SBT Screen Reason for Failure ? Agitation ? O2 Sat < or = 88% 
? FIO2 > 50% ? PEEP >7 
? MI 
? Vasopressor Use 
? Bilevel setting on Vent ? Oscillator in use ? Increased resp effort Select reason as appropriate for NOT proceeding on to the SBT Wake Up and Breathe Protocol

## 2020-11-03 NOTE — PROGRESS NOTES
Bedside and Verbal shift change report given to Rosie Groves (oncoming nurse) by Ramila Miller (offgoing nurse). Report included the following information SBAR, MAR, Recent Results and Cardiac Rhythm SBAR.

## 2020-11-03 NOTE — PROGRESS NOTES
Called by nurse that pt was noted to be severely dyspneic and hypoxic earlier. Calmed down but came to seen and she was trying to get OOB. BIPAP only at 12/8 with F of 10. Will up BIPAP to 16/12 with rate of 20-24 depending on comfort. If she remains hypoxic, will  need intubation.  
 
Luis E Campos MD

## 2020-11-03 NOTE — PROGRESS NOTES
Received call from nutritional services that pt needs PICC line for TPN, not ordered as such. Will assess pt for PICC placement.

## 2020-11-03 NOTE — PROGRESS NOTES
Patient is now on BIPAP 100% and desaturate rapidly when she gets agitated. I tried to reach her spouse twice by phone and left a message for him to call back but did not call back yet. She is at the point of need intubation and mechanical ventilation Vania Belle MD

## 2020-11-03 NOTE — PROGRESS NOTES
Critical Care Daily Progress Note: 11/3/2020 Aishwarya Ross Admission Date: 10/30/2020 The patient's chart is reviewed and the patient is discussed with the staff. Patient is a 44 y.o.  female  with past medical history of hypertension, rheumatoid arthritis, obstructive sleep apnea on CPAP and depression/anxiety presented to the ED on 10/30 for worsening of shortness of breath.  Patient reports recent diagnosis of COVID-19 infection 8 days ago.  Symptoms started as fatigue, body aches and shortness of breath.  Patient reports worsening of exertional dyspnea for last 2 days and noted desaturations yesterday in 62s.  She is using CPAP at home and tried it with improvement in saturation.  Denies fever, chills, nausea, vomiting, abdominal pain or diarrhea. She did have loss of taste and smell. 
  
In the ED patient saturated in 80s on room air and was started on oxygen via nasal cannula requiring 6 to 8 L to maintain oxygen level above 92%.  Labs shows WBC 6.1, hemoglobin 13.4, potassium 3, ferritin 348, CRP 11.7 and lactic acid 1.5.  CXR shows diffuse bilateral small airspace consolidations characteristic of pneumonia from COVID-19 Following admission she has received dexamethasone and convalescent plasma but is  Too far out from dx to get remdesivire. She has required increase fio2 since admit and today she is on 94% airvo and sats are low 90s but do drop in the 70s with any movement. Subjective:  
 
Has severe delirium last night and now on Precedex drip Still on BIPAP with FIO2 90% and sat 94% Current Facility-Administered Medications Medication Dose Route Frequency  fentaNYL citrate (PF) injection 25 mcg  25 mcg IntraVENous Q4H PRN  pantoprazole (PROTONIX) 40 mg in 0.9% sodium chloride 10 mL injection  40 mg IntraVENous Q24H  
 LORazepam (ATIVAN) injection 2 mg  2 mg IntraVENous Q4H PRN  
  dexmedeTOMidine in 0.9 % NaCl (PRECEDEX) 400 mcg/100 mL (4 mcg/mL) infusion soln  0.1-1.5 mcg/kg/hr IntraVENous TITRATE  ascorbic acid (vitamin C) 1.5 g in dextrose 5% 50 mL IVPB  1.5 g IntraVENous BID  doxycycline (VIBRAMYCIN) 100 mg in 0.9% sodium chloride (MBP/ADV) 100 mL  100 mg IntraVENous Q12H  
 [Held by provider] propranolol LA (INDERAL LA) capsule 120 mg  120 mg Oral QHS  LORazepam (ATIVAN) tablet 0.5 mg  0.5 mg Oral Q6H PRN  
 atorvastatin (LIPITOR) tablet 40 mg  40 mg Oral QHS  [Held by provider] furosemide (LASIX) tablet 40 mg  40 mg Oral DAILY  [Held by provider] hydroCHLOROthiazide (HYDRODIURIL) tablet 25 mg  25 mg Oral DAILY  potassium chloride (K-DUR, KLOR-CON) SR tablet 20 mEq  20 mEq Oral DAILY  venlafaxine-SR (EFFEXOR-XR) capsule 150 mg  150 mg Oral DAILY WITH BREAKFAST  zolpidem (AMBIEN) tablet 10 mg  10 mg Oral QHS PRN  
 NUTRITIONAL SUPPORT ELECTROLYTE PRN ORDERS   Does Not Apply PRN  
 HYDROcodone-acetaminophen (NORCO) 5-325 mg per tablet 1 Tab  1 Tab Oral Q4H PRN  
 HYDROcodone-acetaminophen (NORCO)  mg tablet 1 Tab  1 Tab Oral Q4H PRN  
 budesonide-formoterol (SYMBICORT) 80-4.5 mcg inhaler  2 Puff Inhalation BID RT  
 albuterol (PROVENTIL HFA, VENTOLIN HFA, PROAIR HFA) inhaler 2 Puff  2 Puff Inhalation Q4H PRN  
 alum-mag hydroxide-simeth (MYLANTA) oral suspension 30 mL  30 mL Oral Q4H PRN  
 sodium chloride (NS) flush 5-40 mL  5-40 mL IntraVENous Q8H  
 sodium chloride (NS) flush 5-40 mL  5-40 mL IntraVENous PRN  
 acetaminophen (TYLENOL) tablet 650 mg  650 mg Oral Q6H PRN Or  
 acetaminophen (TYLENOL) suppository 650 mg  650 mg Rectal Q6H PRN  polyethylene glycol (MIRALAX) packet 17 g  17 g Oral DAILY PRN  promethazine (PHENERGAN) tablet 12.5 mg  12.5 mg Oral Q6H PRN Or  
 ondansetron (ZOFRAN) injection 4 mg  4 mg IntraVENous Q6H PRN  
 enoxaparin (LOVENOX) injection 40 mg  40 mg SubCUTAneous Q12H  0.9% sodium chloride infusion 250 mL  250 mL IntraVENous PRN  zinc sulfate tablet 220 mg  220 mg Oral DAILY Review of Systems Unobtainable due to patient status. Objective:  
 
Vitals:  
 11/03/20 3778 11/03/20 0723 11/03/20 0729 11/03/20 4749 BP: (!) 140/66  119/72 Pulse: 81  86 Resp: (!) 46  (!) 42 Temp:  100 °F (37.8 °C) SpO2: 90%  96% 91% Weight:      
Height:      
 
 
 
Intake/Output Summary (Last 24 hours) at 11/3/2020 0964 Last data filed at 11/3/2020 0600 Gross per 24 hour Intake 318.83 ml Output 725 ml Net -406.17 ml Physical Exam:         
Constitutional:  the patient is well developed and in no acute distress EENMT:  Sclera clear, pupils equal, oral mucosa moist 
Respiratory: decreased BS in the bases Cardiovascular:  RRR without M,G,R 
Gastrointestinal: soft and non-tender; with positive bowel sounds. Musculoskeletal: warm without cyanosis. There is no lower extremity edema. Skin:  no jaundice or rashes, no wounds Neurologic: no gross neuro deficits Psychiatric:  alert and awake LINES: 
Lopez DRIPS: 
Precedex CXR:  
 
 
 
 
LAB Recent Labs 11/02/20 
1648 GLUCPOC 130* Recent Labs 11/03/20 
0065 11/02/20 
6040 WBC 18.4* 15.8* HGB 12.5 12.2 HCT 37.3 37.0  247 Recent Labs 11/03/20 
8636 11/02/20 
3758 11/01/20 
7072  139 138  
K 4.2 3.7 4.0  
 104 104 CO2 29 28 29 * 114* 144* BUN 14 12 7 CREA 0.55* 0.59* 0.53* MG 2.6*  --  2.4 PHOS 2.7  --  2.3*  
CA 8.0* 8.2* 8.1* Recent Labs 11/03/20 
0245 PHI 7.43  
PCO2I 36.2 PO2I 71* HCO3I 24.2 No results for input(s): LCAD, LAC in the last 72 hours. Recent Labs 11/03/20 
0245 PHI 7.43  
PCO2I 36.2 PO2I 71* HCO3I 24.2 Patient Active Problem List  
Diagnosis Code  
 HTN (hypertension) I10  
 HLD (hyperlipidemia) E78.5  Fibromyalgia M79.7  RA (rheumatoid arthritis) (HCC) M06.9  Chronic fatigue R53.82  
 H/O Clostridium difficile infection Z86.19  
 Recurrent UTI N39.0  Asthma J45.909  Depression F32.9  Menopausal state N95.1  Malaise and fatigue R53.81, R53.83  
 GERD (gastroesophageal reflux disease) K21.9  Morbid obesity ([facility]) E66.01  
 GLENYS (obstructive sleep apnea) G47.33  
 Persistent disorder of initiating or maintaining sleep G47.00  
 Urgency of urination R39.15  Dyslipidemia E78.5  Essential hypertension I10  
 Palpitations R00.2  Allergic rhinitis J30.9  Eczema L30.9  Edema R60.9  Abnormal liver function tests R94.5  Essential tremor G25.0  Irritable bowel syndrome K58.9  Lateral epicondylitis M77.10  
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  
 PTSD (post-traumatic stress disorder) F43.10  
 Osteoarthritis M19.90  Obesity, morbid (more than 100 lbs over ideal weight or BMI > 40) ([facility]) E66.01  
 Kidney stones N20.0  Heart palpitations R00.2  Diabetes ([address] E11.9  Cystitis N30.90  Chronic pain G89.29  
 ADHD (attention deficit hyperactivity disorder) F90.9  Incisional hernia K43.2  Acute hypoxemic respiratory failure due to COVID-19 ([facility]) U07.1, J96.01  
 COVID-19 U07.1  Pneumonia due to COVID-19 virus U07.1, J12.89 Assessment:  (Medical Decision Making) Hospital Problems  Date Reviewed: 10/21/2020 Codes Class Noted POA Pneumonia due to COVID-19 virus ICD-10-CM: U07.1, J12.89 ICD-9-CM: 480.8  11/1/2020 Unknown * (Principal) Acute hypoxemic respiratory failure due to COVID-19 [facility]) ICD-10-CM: U07.1, J96.01 
ICD-9-CM: 518.81, 079.89, 799.02  10/30/2020 Yes COVID-19 ICD-10-CM: U07.1 ICD-9-CM: 079.89  10/30/2020 Yes GLENYS (obstructive sleep apnea) (Chronic) ICD-10-CM: G47.33 
ICD-9-CM: 327.23  7/21/2015 Yes HTN (hypertension) (Chronic) ICD-10-CM: I10 
ICD-9-CM: 401.9  12/14/2012 Yes Overview Signed 12/17/2012  9:09 AM by Karely Brewer MD  
  The patient is stable on the current medications. Tolerating well. No sides effects. Will not adjust at this time. HLD (hyperlipidemia) (Chronic) ICD-10-CM: E14.6 ICD-9-CM: 272.4  12/14/2012 Yes Overview Signed 12/17/2012  9:09 AM by Karely Brewer MD  
  The patient is stable on the current medications. Tolerating well. No sides effects. Will not adjust at this time. Fibromyalgia (Chronic) ICD-10-CM: M79.7 ICD-9-CM: 729.1  12/14/2012 Yes Overview Signed 12/17/2012  9:10 AM by Karely Brewer MD  
  The patient is stable on the current medications. Tolerating well. No sides effects. Will not adjust at this time. RA (rheumatoid arthritis) (HCC) (Chronic) ICD-10-CM: M06.9 ICD-9-CM: 714.0  12/14/2012 Yes Overview Signed 12/17/2012  9:11 AM by Karely Brewer MD  
  The patient is stable on the current medications. Tolerating well. No sides effects. Will not adjust at this time. Followed by Dr. Janessa Esposito. Plan:  (Medical Decision Making) --adjust BIPAP and change to AVAP mode, if worsen will need intubation 
--add Fentanyl prn 
--Decadron for total 10 days 
--IV Protonix --Lovenox 40 mg Q 12 hrs 
--follow ABG and LDH,CRP,Ferritin, IL-6 level Updated her spouse Clari Joshua by phone [phone] Total time spent with patient is 35 min so far More than 50% of the time documented was spent in face-to-face contact with the patient and in the care of the patient on the floor/unit where the patient is located.  
 
Bob Mcintyre MD

## 2020-11-03 NOTE — PROGRESS NOTES
Pt became agitated and restless. Repeatedly pulling off Bi-pap and attempting to get out of bed. O2 sat in the low 70s. MD notified. 50 mcg of fentanyl push ordered and given. Placed on 100 FIO2. O2 saturation returned to low 90s. MD notified. Pt is currently resting in bed with Bi-pap and mitts placed. Will continue to monitor.

## 2020-11-03 NOTE — PROGRESS NOTES
Ventilator check complete; patient has a #7. 0 ET tube secured at the 25 at the lip. Patient is not able to follow commands. Breath sounds are coarse. Trachea is midline, Negative for subcutaneous air, and chest excursion is symmetric. Patient is also Negative for cyanosis and is Negative for pitting edema. All alarms are set and audible. Resuscitation bag is at the head of the bed. Ventilator Settings Mode FIO2 Rate Tidal Volume Pressure PEEP I:E Ratio Pressure control  100%   34      14 18 cm H20  1.3:1   
 
Peak airway pressure: 37 cm H2O Minute ventilation: 17.9 l/min ABG: No results for input(s): PH, PCO2, PO2, HCO3 in the last 72 hours.  
 
 
Zackary Michele, RT

## 2020-11-04 PROBLEM — J80 ACUTE RESPIRATORY DISTRESS SYNDROME (ARDS) DUE TO COVID-19 VIRUS (HCC): Status: ACTIVE | Noted: 2020-01-01

## 2020-11-04 PROBLEM — U07.1 ACUTE RESPIRATORY DISTRESS SYNDROME (ARDS) DUE TO COVID-19 VIRUS (HCC): Status: ACTIVE | Noted: 2020-01-01

## 2020-11-04 NOTE — PROGRESS NOTES
Ventilator check complete; patient has a #7. 0 ET tube secured at the 24 at the teeth. Patient is sedated. Patient is not able to follow commands. Breath sounds are coarse. Trachea is midline, Negative for subcutaneous air, and chest excursion is symmetric. Patient is also Negative for cyanosis and is Negative for pitting edema. All alarms are set and audible. Resuscitation bag is at the head of the bed. Ventilator Settings Mode FIO2 Rate Tidal Volume Pressure PEEP I:E Ratio Pressure control  100 %   34     16cm H20  18 cm H20  1.3:1   
 
Peak airway pressure: 33 cm H2O Minute ventilation: 18.6 l/min ABG: No results for input(s): PH, PCO2, PO2, HCO3 in the last 72 hours.  
 
 
Juan Jose Stringer, RT

## 2020-11-04 NOTE — PROGRESS NOTES
Ventilator check complete; patient has a #7. 0 ET tube secured at the 25 at the lip. Patient is sedated. Patient is not able to follow commands. Breath sounds are diminished. Trachea is midline, Negative for subcutaneous air, and chest excursion is symmetric. Patient is also Negative for cyanosis. All alarms are set and audible. Resuscitation bag is at the head of the bed. Ventilator Settings Mode FIO2 Rate Tidal Volume Pressure PEEP I:E Ratio Pressure control  80 %   34      16 cm H2O 15 cm H20  1:1.54 Peak airway pressure: 28.5 cm H2O Minute ventilation: 16.5 l/min ABG: No results for input(s): PH, PCO2, PO2, HCO3 in the last 72 hours.  
 
 
Viola Chandra, RT

## 2020-11-04 NOTE — ROUTINE PROCESS
[address]    [place] [address]    Phone:  [phone]                                       After Visit Summary   1/5/2018    Liv Sylvester    MRN: [id number]           After Visit Summary Signature Page     I have received my discharge instructions, and my questions have been answered. I have discussed any challenges I see with this plan with the nurse or doctor.    ..........................................................................................................................................  Patient/Patient Representative Signature      ..........................................................................................................................................  Patient Representative Print Name and Relationship to Patient    ..................................................               ................................................  Date                                            Time    ..........................................................................................................................................  Reviewed by Signature/Title    ...................................................              ..............................................  Date                                                            Time           Respiratory Care Services Policy Number: [id number] Title: Oxygen Protocol Effective Date: 01/1996 Revised Date: 06/2013, 02/29/2016, 4/2018, 7/2019 Reviewed Date: 05/2014, 03/2015, 06/2017 I. Policy: The Oxygen Protocol will be initiated for all patients upon written order from physician for administration of oxygen therapy or if a patient is found to have an oxygen saturation of 88% or less. Special consideration: the goal of oxygen therapy for COPD patients is to maintain oxygen saturation between 88% - 92% to comply with GOLD Guidelines. II. Purpose: To provide protocol driven respiratory therapy for the administration of oxygen at concentrations greater than that in ambient air with the intent of treating or preventing the symptoms and manifestations of hypoxia. III. Responsibility: Director Respiratory Care Services, all Respiratory Care Practitioners IV. Indications: A. Implement this protocol for patients when physician orders oxygen to be administered or when patient is found to have an oxygen saturation of 88% or less. B. To assure routine monitoring of patient's oxygen saturation b.i.d. and to make appropriate adjustments in accordance with ordered oxygen saturation parameters. C. To assure continuity of respiratory care that meets [facility] Clinical Practice Guidelines and GOLD Guidelines. D. Hb < 8 
E. Sickle Cell anemia crisis V. Assessment:  Assess the following parameters to determine the need to adjust oxygen: A. Measurement of patient's oxygen saturation via pulse oximetry. B. Observation of patient's color, respiratory effort, and responsiveness. C. Measurement of heart rate and respiratory rate. D. Complete a three-step ambulatory oxygen saturation when ordered. VI. Initiation:  Upon receipt of an order for oxygen, the RCP will: A. Verify order in the patient's EMR, which should include the desired oxygen saturation to be maintained. B. The patient shall be placed on oxygen with humidity (except for those oxygen delivery devices that do not require humidity, i.e. venturi masks and non-rebreather masks) as ordered by the physician to achieve the prescribed oxygen saturation. C. In the event that no saturation is specified, a saturation of 90% will be maintained. D. Patients, who are found to have a SaO2 of 88% or less, may be started on supplemental oxygen as described above. E. Patients admitted with cardiac problems/disease shall be maintained at 92% per Chest Committee recommendation. F. The patient will be informed of the \"no smoking policy\" and instructed in the proper use of oxygen therapy. G. Once desired oxygen saturation has been achieved, the RCP will document FIO2 and oxygen saturation in the respiratory section of the patient's EMR. VII. Maintenance: A. 30-second oxygen saturation check will be taken to maintain the saturation ordered by the physician each day. B. Patients will be assessed each shift and as needed by pulse oximetry to determine if oxygen needs to be decreased, increased or discontinued. C. If changes in FIO2 are indicated, all changes will be documented in the respiratory section of the patient's EMR. D. If no changes in FIO2 are required, the patient's oxygen flow rate and saturation will be recorded in the respiratory section of the patient's EMR. E. Per [facility], patients who are receiving oxygen therapy but are not on oxygen at home, should be weaned off oxygen as soon as possible or when anticipated discharge becomes evident. Raven Vanessa will be discontinued after oxygen saturation has been maintained for 24 hours on room air and documented in the patient's EMR. G. Patients on the Inpatient Rehabilitation area on 9th floor will be exempt from having their oxygen discontinued per protocol.  Oxygen may be weaned but will be changed to prn to meet the needs of the patient when exercising and participating in therapy. H. The goal of oxygen therapy is to maintain patients with a diagnosis of COPD at oxygen saturation between 88% - 92% to comply with GOLD Guidelines. VIII. Safety: RCP will address the following safety issues: A. Identify patient using the two patient identifiers name and birth date via ID bracelet. B. Perform hand hygiene per hospital policy utilizing Standard Precautions for all patients and following transmission-based isolation as indicated per hospital policy. C. Cardiac patients will be maintained at an oxygen saturation of 92%. D. If a patient's FIO2 requirements necessitate changing oxygen delivery devices to a high concentration of oxygen, documentation indicating the change must be included in the progress notes, as well as in the respiratory flowsheet. E. If a patient has a hemoglobin level <8 mg. RCP will consult physician before discontinuing oxygen. IX. Interventions: A. RCP will assess patient for signs of respiratory distress or suspicion of CO2 retention. B. An ABG may be obtained for patients exhibiting respiratory distress or sickle cell      crisis. C. An order should be entered into patient's EMR for ABG under per protocol. X. Documentation A. Document assessment findings in the respiratory section of the patient's EMR. B. Document changes in therapy per protocol in the respiratory orders section and in the care plan section of the patient's EMR. C. Document patient education in the patient education section of the patient's EMR. XI. Reportable Conditions:  Report to the physician immediately: A. Acute changes in patient's respiratory status. B. An oxygen saturation <85%. C. A change in oxygen delivery device to provide a high concentration of oxygen. XII. Patient Instructions: Review with Patient A. Purpose of oxygen therapy B. Proper technique for using oxygen C. No smoking policy Approval: Pulmonary Committee (1-25-96) Revision: Chest Committee (4-28-05) L - Respiratory Care Department Policy, Procedure and Protocol Guideline Manual, 1995, POLO Renae. L - Therapist Driven Respiratory Care Protocols  A Practitioner's Guide for Criteria-Based Respiratory Care by Jacquelyn Flores M.D., and POLO Gray RRT. L - The rationale for therapist-driven protocols: an update. Respiratory Care 1998. [facility] Clinical Practice Guidelines. Respiratory Care Services Policy Number: -[id number] Title: Patient Care Assessment Protocol Effective Date: 01/1999 Revised Date: 05/2014, 04/2018, 12/2018, 07/2019 Reviewed Date: 06/2013/ 03/2015, 03/2016, 06/2017 Overview In an effort to improve quality and reduce costs of respiratory care at [facility], the Respiratory Department has developed a number of Patient Care Protocols. These protocols have been developed according to Santos 3 and are utilized for those patients who are ordered respiratory therapy using therapeutic indications and standardized approaches for accomplishing objectives. Patient Care Protocols are intended to improve care by: ? Defining the indications and standards of care agreed upon by the Pulmonary Medicine and [facility] [address] of [facility]. ? Training respiratory care practitioners to apply those criteria to individual patients and modify therapy as indicated by the protocols. ? Documenting the indication and care plan as part of the initial ordering process. ? Tapering or discontinuing treatments once the indication for therapy changes. The Patient Care Protocols shall be universally applied throughout the hospital as determined by  
the Pulmonary Medicine and [facility] Rationale for Patient Care Assessment Protocols: 
? Continuous Quality Improvement ? Cost containment ? Standardization of care 
? Enhanced continuity of care ? Utilization review ? Timely intervention The following patient care assessment protocols have been developed: ? Aerosolized Medication Protocol http://[facility]/stfrancis/policies/Respiratory%20Care%20Services%20Policies/-[id number]. doc ? Bronchial Hygiene Protocol http://spb/localCityHookQu[facility]/stfrancis/policies/Respiratory%20Care%20Services%20Policies/-[id number]. doc 
? Oxygen Protocolhttp://spb/[facility]/stfrancis/policies/Respiratory Care Services Policies/-[id number]. doc http://spb/[facility] Hospital[facility]/stfrancis/policies/Respiratory%20Care%20Services%20Policies/-[id number]. doc 
? CVRU Fast Track Weaning Protocol http://spb/[facility] Hospital[facility]/stfrancis/policies/Respiratory%20Care%20Services%20Policies/-[id number]. doc ? Asthma Treatment Protocol ERhttp://spb/[facility]/stfrancis/policies/Respiratory Care Services Policies/-[id number]. doc http://spb/[facility] Hospital[facility]/stfrancis/policies/Respiratory%20Care%20Services%20Policies/-[id number]. doc ? Pediatric Asthma Treatment Protocol ERhttp://spb/[facility]/stfrancis/policies/Respiratory Care Services Policies/-[id number]. doc http://spb/localTravelatus/[facility]/stfrancis/policies/Respiratory%20Care%20Services%20Policies/-[id number]. doc ? Alpha-1 Antitrypsin Deficiency Protocolhttp://spb/localCityHookstems/gvl/stfrancis/policies/Respiratory Care Services Policies/-[id number]. doc http://[facility]/stancis/policies/Respiratory%20Care%20Services%20Policies/-[id number]. doc ? Prone Positioning Protocol http://[facility]is/policies/Respiratory%20Care%20Services%20Policies/-[id number]. doc 
? COPD Protocol http://[facility]/policies/Respiratory%20Care%20Services%20Policies/-[id number]. doc 
?  Home Oxygen Assessment Protocolhttp://[facility]/stfrancis/policies/Respiratory Care Services Policies/-[id number]. doc http://spb/[facility]/stfrancis/policies/Respiratory%20Care%20Services%20Policies/-[id number]. doc 
? Ventilator Weaning Protocol http://spb/[facility]/stfrancis/policies/Respiratory%20Care%20Services%20Policies/-[id number]. doc ? Lung Volume Expansion Protocolhttp://spb/[facility]/stfrancis/policies/Respiratory Care Services Policies/-[id number]. doc http://[facility]/stfrancis/policies/Respiratory%20Care%20Services%20Policies/-[id number]. docm The Director of [address] [place] [address] oversees the Patient Care Assessment Program. The Respiratory Educator is responsible for protocol development and training. The Supervisor is responsible for implementation and  activities. Each patient with an order for respiratory treatments will receive an evaluation. Respiratory Care Practitioners (RCP's) will perform the evaluations. The same evaluation tool will be utilized for initial and follow-up assessments. If the patient does not meet criteria for ordered therapy, the therapy will be discontinued. If the patient demonstrates an adverse response to initially ordered therapy, the therapy will be discontinued and the physician will be contacted. Specific physician's orders that deviate from protocols and are deemed \"inappropriate\" or \"unsafe\" will be addressed with ordering physician and/or medical director as required. Respiratory Patient Care Assessment Protocols I. Policy:   In an effort to provide quality patient care and effective utilization of services, physicians who order respiratory therapy will have their patients treated via the protocols established (see attached) Respiratory Care Practitioners (RCP's) will complete the initial assessment which will indicate patient needs,  the care plan developed and will performed within 24 hours of admission. Frequency of the therapy will be set according to the results of the respiratory therapy evaluation and frequency guidelines policy. Reassessment will be continued every 48 hours and more frequently as needed for the individual patient. II. Purpose: F. To provide a process that will allow for ongoing assessment and care plan modification for patients receiving respiratory services based on both objective and or subjective patient responses to interventions. This process of protocol utilization will assist in patient care progression while eliminating the need for the physician to continually update respiratory therapy orders. G. To assure continuity of respiratory care that meets [facility] Clinical Practice Guidelines. III. Initiation:  Implement Respiratory Care Protocols for patients who are ordered by physician  
       to receive respiratory therapy procedures or for ventilator management. IV. Protocol: 
E. Upon receiving an order for therapy the RCP will review the patient's EMR (electronic medical record) for all pertinent information includin. Physician's order for therapy 2. Patient history and physical examination 3. Physician progress notes 4. Diagnostic. X-rays, PFT's, arterial blood gases etc. 
F. The RCP will perform a respiratory assessment in the following manner: 1. General observations: color, pattern and effort of breathing, chest expansion, (symmetrical and bilateral), level of consciousness and the ability to ambulate. 2. The RCP will assess patient's cough ability and determine if bronchial hygiene is needed. If patient is unable to produce sputum, at that time, the RCP should question the patient with regard to their sputum: production, color consistency, frequency and amount.  
3. Auscultation: Using a stethoscope, the RCP will listen and note quality of breath sounds and presence or absence of adventitious breath sounds in all lung fields, both anteriorly and posteriorly. 4. Upon completing the EMR review and physical assessment, the RCP will document findings in the RT Assessment section of the EMR. The score level will be provided and will be used to determine the frequency of therapy. V. Indications: A. Bronchial Hygiene Protocol indications: 1. Potential for or presence of atelectasis. 1. Need for hydration and removal of retained secretions. 1. Need for improvement of cough effectiveness. 1. Presence of conditions associated with disorder of pulmonary clearance: 
a. Cystic fibrosis b. Bronchiectasis 
c. Neuromuscular disease 
d. Obstructive lung diseases 
e. Restrictive lung diseases Aerosolized Medication(s) Protocol indications:Treatment of bronchospasm/wheezing 2. Improvement of mucociliary clearance 3. Treatment of stridor 4. History of Bronchiectasis, Asthma or COPD 
C. Oxygen Therapy Protocol indications: 1. Documented hypoxemia 2. Severe trauma 3. Acute myocardial infarction 4. Short-term therapy (e.g. post anesthesia recovery) D. CVRU Ventilator Weaning Protocol indications: 1. All mechanically ventilated surgical patients unless they have a no wean order. E. Asthma Treatment Protocol ER indications: 1. Patients 15years of age and older that have been triaged or diagnosed with   asthma exacerbation shall be indicated for the ER Asthma Treatment Protocol. A physician order will be required to initiate the protocol. F. Pediatric Asthma protocol in the ER indications: 1. Patients less than 15years old that have been triaged or diagnosed with asthma exacerbation shall be indicated for the Pediatric Asthma protocol. A physician order will be required to initiate the protocol. G. Alpha-1 Antitrypsin Deficiency Testing protocol indications: 1. Patients admitted and diagnosed with COPD. H. Prone Positioning Protocol indications: 
       1. Acute lung injury 2. Acute respiratory distress syndrome (ARDS) I. Respiratory Care COPD Protocol indications: 1. History of COPD in patient's records 2. Smoking history J. Home Oxygen Assessment Protocol indications: 1. Chronic lung disease 2. Cor pulmonale 3. Unable to wean to room air 48 hours prior to anticipated discharge. K.  Ventilator Weaning Protocol indications: 1. Patient's mechanically ventilated 2. Managed by intensivist 
L. Lung Volume Expansion Protocol indications: 
      1. Any patient at risk for pulmonary complications. VI. Maintenance: H. Timely patient assessment is an integral part of this protocol therefore the following will be applied: 1. All non- critical care patients will be evaluated upon receiving initial respiratory care orders within 24 hours and re-evaluated within 48 hours (or more as needed). 2. Orders requesting a Respiratory Consult will be responded to in the following manner: 
a. In patient emergency situations, the RCP assigned to the floor will respond immediately to the patient, provide an initial respiratory assessment, and contact the patient's physician as necessary for appropriate orders. b. In non-emergent situations, the RCP assigned to the floor will respond to the patient within 90 minutes and provide an initial respiratory assessment and contact patient's physician as necessary for appropriate orders. c. An RCP will provide a comprehensive assessment as soon as possible. 3. Upon completion of an evaluation, the RCP will complete documentation in the patient's EMR in the RT Assessment section. 4. The RCP who completes the assessment will document orders for therapy in the orders section of the patient's EMR selecting new order. Next, per protocol should be selected indicating it is a protocol order and sign orders should be selected to complete the process.  The applicable protocol must be added to the progress note per Joint Commission guidelines. 5. The Pharmacy and Therapeutics (P&T) Committee has mandated that the medication Xopenex may be changed to unit dose albuterol without an order, except for those patients receiving Xopenex due to cardiac arrhythmias. 1. The dosage for these patients should be 0.63 mg. and may be changed from 1.25 mg. to 0.63 mg per P & T Committee by the RCP completing the assessment. 6. Patients who are not experiencing cardiac arrhythmias, and are ordered Xopenex and Atrovent may be changed to Duoneb. VII. Safety:       
I. The following safety issues shall be monitored: 1. The RCP will perform hand hygiene per hospital policy utilizing Standard Precautions for all patients and following transmission-based isolation as indicated per hospital policy. 2. The RCP must exercise professional judgment in classifying the patient for frequency of therapy. 3. Appropriate classification of the patient will require an evaluation utilizing the Therapy Assessment Protocol Guidelines. 4. The RCP will confer with the physician concerning the care of the patient at any time questions or problems arise. 5. If during therapy, the patient exhibits no improvement, or deterioration in clinical status the RCP will notify the physician and the patient's nurse. VIII. Interventions:  
F. The patient's nurse is responsible concerning all items related to his/her care. Ongoing communication with nursing is essential to successful protocol management. G. The RCP recognizes the value of the team approach in meeting the patient's needs. Nursing input regarding the patient's pulmonary condition will be sought as needed. IX. Reportable conditions: The RCP will inform the physician if: 1. There are acute changes in patient's respiratory status. 2. The therapist is unable to determine appropriate care plan upon assessment. 3. The patient fails to reach therapeutic objective. 4. A change or additional medication is needed. X.  Patient Education:   
D. Patient will receive instruction on the followin. The treatment modality, including objectives and proper technique of therapy 2. Respiratory medications E. Documentation shall occur in the patient education section of the patient's EMR. XI. Documentation: Record all findings as described above in the patient's EMR. Related Protocols: A. Aerosolized Medication Protocol B. Bronchial Hygiene C. Oxygen Protocol D. [facility] Fast Track Weaning Protocol E. Asthma Treatment protocol ER 
F. Alpha-1 antitrypsin Deficiency Protocol G. Prone Positioning Protocol H. Respiratory Care COPD Protocol I. Home Oxygen assessment Protocol J. Ventilator Weaning Protocols K. Volume Expansion protocol Indications      Frequency          Level A. Aerosol therapy 1.  q4h     Severe SOB, wheezing, unable to sleep 1 2.  qid, q4 wa or q6h   Moderate SOB, wheezing   2 3.  tid      Hx of asthma, or COPD mild wheezing,  
      or facilitate secretion removal              3 
 4.  bid      Asthma, or COPD, Intermittent wheezing 4 5. PRN, i.e. tid PRN, qid PRN Asthma, or COPD, occasional wheezing 5 B. Bronchopulmonary Hygiene 1. q4h             Copious secretions, SOB, unable to sleep 1 2. qid & PRN            Moderate amounts of secretions   2 3. tid           Small amounts of secretions and poor cough,   
           history of secretions    3 4. PRN, i.e. tid PRN, qid PRN     Breathing exercises, encourage cough only 4  
  
C. Oxygen Therapy     Follow hospital approved Oxygen Protocol Note: 
qid treatments are due 0800, 1200, 1600, and 2000. tid treatments are due 0800, 1400, and 2000 Q6h treatments are due 0800, 1400, 2000, 0200 Q4 wa teatments are due 0800, 1200, 1600, and 2000. Q4h treatments are due  0800, 1200, 1600, 2000, 0000, and 0400. The Level 1-5 rating system is only to be used as criteria for determining appropriate frequency of therapy. References:  
[address] Standard L    Respiratory Care Department Policy, Procedure and Protocol Guideline Manual, 1995, POLO WATSON  Therapist Driven Respiratory Care Protocols  A Practitioner's Guide for Criteria-Based Respiratory Care by Julio Huynh M.D., and POLO Reyna RRT. SHIRLEY  The rationale for therapist-driven protocols: an update. Respiratory Care 1998; 05:909-094 L Therapist Driven Respiratory Care Protocols  A Practitioner's Guide for Criteria-Based Respiratory Care by Julio Huynh M.D., and POLO Reyna RRT. SHIRLEY The rationale for therapist-driven protocols: an update. Respiratory Care 1998; [id number]. N   [facility] Clinical Practice Guidelines. D. The RCP will perform a respiratory assessment in the following manner: 1. Perform hand hygiene per hospital policy utilizing Standard Precautions for all patients and following transmission-based isolation as indicated per policy. 2. Identify patient via ID bracelet verifying patient name and birth date. 3. General observations: color, pattern and effort of breathing, chest expansion, (symmetrical and bilateral), level of consciousness and the ability to ambulate. 4. The RCP will assess patients cough ability and determine if Nasotracheal suctioning is needed. If patient is unable to produce sputum, at that time, the RCP should question the patient with regard to their sputum: production, color consistency, frequency and amount. 5. Auscultation: Using a stethoscope, the RCP will listen and note quality of breath sounds and presence or absence of adventitious breath sounds in all lung fields, both anteriorly and posteriorly. 6. Upon completing the EMR review and physical assessment, the RCP will document findings in the RT Assessment section of the EMR.  The score level will be provided and will be used to determine the frequency of therapy. V. Indications: A. Indications for Bronchial Hygiene Protocol will include: 1. Potential for or presence of atelectasis. 2. Need for hydration and removal of retained secretions. 3. Need for improvement of cough effectiveness. 4. Presence of conditions associated with disorder of pulmonary clearance: 
a. Cystic fibrosis b. Bronchiectasis B. Indications for Aerosolized Medication(s) Protocol should include: 1. Treatment of bronchospasm/wheezing 2. Improvement of mucociliary clearance 3. Treatment of stridor 4. History of Asthma or COPD 
           C.  Indications for Oxygen Therapy Protocol should include: 1. Documented hypoxemia 2. Severe trauma 3. Acute myocardial infarction 4. Short-term therapy (e.g. post anesthesia recovery) VI. Maintenance:   
D. Timely patient assessment is an integral part of this protocol therefore the following will be applied: 1. All non- critical care patients will be evaluated upon receiving initial respiratory care orders within 24 hours and re-evaluated within 48 hours (or more as needed). 2. Orders requesting a Respiratory Consult will be responded to in the following manner: 
a. In patient emergency situations, the RCP assigned to the floor will respond immediately to the patient, provide an initial respiratory assessment, and contact the patients physician as necessary for appropriate orders. b. In non-emergent situations, the RCP assigned to the floor will respond to the patient within 90 minutes and provide an initial respiratory assessment and contact patients physician as necessary for appropriate orders. c. An RCP will provide a comprehensive assessment as soon as possible. 3. Upon completion of an evaluation, the RCP will complete documentation in the patients EMR in the RT Assessment section.  
4. The RCP who completes the assessment will document orders for therapy in the orders section of the patients EMR selecting new order. Next, per protocol should be selected indicating it is a protocol order and sign orders should be selected to complete the process. 5. The Pharmacy and Therapeutics (P&T) Committee has mandated that the medication Xopenex may be changed to unit dose albuterol without an order, except for those patients receiving Xopenex due to cardiac arrhythmias. The dosage for these patients should be 0.63 mg. and may be changed from 1.25 mg. to 0.63 mg per P & T Committee by the RCP completing the assessment. 6. Patients who are not experiencing cardiac arrhythmias, and are ordered Xopenex and Atrovent may be changed to Duoneb. VII. Safety: A. The following safety issues shall be monitored: 1. The RCP will perform hand hygiene per hospital policy utilizing Standard Precautions for all patients and following transmission-based isolation as indicated per hospital policy. 2. The RCP must exercise professional judgment in classifying the patient for frequency of therapy. 3. Appropriate classification of the patient will require an evaluation utilizing the Therapy Assessment Protocol Guidelines. 4. The RCP will confer with the physician concerning the care of the patient at any time questions or problems arise. 5. If during therapy, the patient exhibits no improvement or deterioration in clinical status the RCP will notify the physician and the patients nurse. VIII. Interventions: A. The patients nurse is responsible concerning all items related to his/her care. Ongoing communication with nursing is essential to successful protocol management. B. The RCP recognizes the value of the team approach in meeting the patients needs. Nursing input regarding the patients pulmonary condition will be sought as needed. IX. Reportable conditions: The RCP will inform the physician if: 1. There are acute changes in patients respiratory status. 2. The therapist is unable to determine appropriate care plan upon assessment. 3. The patient fails to reach therapeutic objective. 4. A change or additional medication is needed. X.  Patient Education: A. Patient will receive instruction on the followin. The treatment modality, including objectives and proper technique of therapy 2. Respiratory medications B. Documentation shall occur in the patient education section of the patients EMR. XI. Documentation: Record all findings as described above in the patients EMR. Related Protocols: A. Aerosolized Medication Protocol B. Bronchial Hygiene C. Oxygen Protocol D. Volume Expansion/Secretion Clearance E. Ventilator Weaning Protocols References: 
[address] Standard L    Respiratory Care Department Policy, Procedure and Protocol Guideline Manual, , POLO WATSON  Therapist Driven Respiratory Care Protocols  A Practitioners Guide for Criteria-Based Respiratory Care by Alberto Carr M.D., and POLO Mckenna RRT. L  The rationale for therapist-driven protocols: an update. Respiratory Care 1998; 38:589-009 N   [facility] Clinical Practice Guidelines. Respiratory Care Services Policy Number: -[id number] Title: Aerosolized Medication Protocol Effective Date: 10/1998 Revised Date: , , ,  Reviewed Date: / 03/15 , ,  I. Policy: The Aerosolized Medication Protocol shall by implemented by Respiratory Care Practitioners (RCP) for patients with orders to receive aerosol therapy with medication. II. Purpose: To open and maintain obstructed airways, the RCP, will utilize the following  
protocol to select the indicated aerosolized medication(s) and determine the most effective method of delivery to the patient. III. Patient Type:  All patients who are determined to meet aerosolized medication criteria as  
       outlined in this protocol. IV. Responsibility: Director, [address], registered Respiratory Care Practitioners (RCP's) with documented competency in the performance of respiratory therapeutic techniques. V. Equipment needed: Mae Cardona I. Pulse oximeter J. AeroEclipse nebulizer K. Dry Powder Inhaler (DPI) VI. Protocol:  
G. The following conditions are accepted indications for aerosolized medication therapy. 5. Bronchospasm/wheezing 6. Impaired mucociliary clearance 7. Tracheobronchial mucosal congestion/and laryngeal stridor 8. Diseases which commonly require aerosolized medication therapy include, but are not limited to: 
f. Asthma/reactive airway disease 
g. Bronchitis/emphysema (COPD) h. Cystic fibrosis 
i. Severe laryngitis/tracheitis 
j. Bronchiectasis 
k. Smoke inhalation or chemical trauma to the lung or upper airway 
l. Physical trauma to the upper airway 
m. Laryngotracheobronchitis 
n. Bronchiolitis 
o. Non-specific wheezing B. Indications for bronchodilator medications will include: 
d. Bronchospasm/ wheezing 
e. Asthma/reactive airway disease 
f. Chronic obstructive pulmonary disease 
g. Obstructive defect on pulmonary function testing C. Administration of medications 5. If a bronchodilator or any other type of respiratory medication is needed, a physician order must be indicated in the medication section in the patient's EMR. 6. When the physician specifies the medication and dosage at the time of request, the ordered medication will be used as part of the care plan. D. The following guidelines will be utilized in the evaluation and selection of the appropriate delivery device for indicated medication(s): 
1. Unassisted aerosol (UA) is the preferred method of aerosol delivery and indicated if 
c. Ventilation is inadequate 
d. Patient demonstrates wheezing e. Routine treatments shall be given via the AeroEclipse nebulizer. f. The Aerogen nebulizer shall be used in the following circumstances: 
i. ER patients and they will continue with this nebulizer if admitted to 8th floor or ICU. 
ii. Patients in ICU  
iii. Patients on 8th floor with severe wheezing (at the RCP's discretion) 2. Dry PowderInhaler (DPI)  
d. Patient should be alert/cooperative 
e. Able to perform 3 second breath hold. f. Patient has used DPI therapy previously, either at home or in the hospital. 
g. Note: The only approved inhaler on formulary is Spiriva. VII. Guidelines:  
Monitor patient's vital signs and evaluate patient's clinical status. The need to change medication and/or modality may be indicated by: 5. A pulse greater than 120 bpm, or if a pulse increase of 20 bpm occurs with bronchodilator medications. 6. Significant worsening of dyspnea or wheezing occurring during or within 30 minutes of discontinuing therapy. 7. Worsening of patient's sensorium (e.g. patient becomes confused or obtunded, and unable to follow directions). 8. Worsening of patient's chest x-ray. 9. Change in sputum (e.g. increased pulmonary infiltrate, which might indicate need for volume expansion therapy). 10. Patient has difficulty coughing up secretions, which might indicate need for acetylcysteine and/or bronchial hygiene therapy. 11. Call physician immediately if dyspnea worsens and is not responsive to modifications allowed by protocol. VIII. Clinical Responsibility: 
2. The therapy assessment guidelines will be used to evaluate all patients receiving aerosolized medications with the exception of critical care areas. 5. RCP's will perform changes in therapy per protocol. 6. It will be the responsibility of RCP to provide instruction regarding respiratory medications, possible side effects, aerosol therapy and proper DPI technique, as well as, spacer usage to patients ordered DPI therapy. 7. Current therapy that is part of a patient's home regimen will not be discontinued. a. Provide spacer and educate patient on proper inhaler technique if needed. IX. Documentation D. Document assessment findings in the respiratory assessment section of the patient's EMR. E. Document changes in therapy per protocol in the respiratory orders section and in the care plan section of the patient's EMR. F. Document patient education in the patient education section of the patient's EMR. X. Outcome Criteria: 
I. Relief of wheezes and obstruction J. Improved cough and sputum color and consistency K. Improved chest x-ray L. Improved arterial oxygen tension and or SaO2 M. Improved Peak Flow on asthmatic patients XI. Related Protocols: J. Respiratory Patient Care Protocols K. Bronchial Hygiene Therapy L. Oxygen Protocol Reference: L - Respiratory Care Department Policy, Procedure and Protocol Guideline Manual, 1995, POLO Renae. L -  Therapist Driven Respiratory Care Protocols  A Practitioner's Guide for Criteria-Based Respiratory Care by Henri Smith M.D., and POLO Kraus, LISBETH. L - The rationale for therapist-driven protocols: an update. Respiratory Care 1998; [id number].  -[facility] Clinical Practice Guidelines. Respiratory Care Services Policy Number: -[id number] Title: Bronchial Hygiene Protocol Effective Date: 01/1999 Revised Date: 12/2014, 11/2017, 7/2019 Reviewed Date: 06/2013, 05/2014, 03/2015, 03/2016, 06/2017, 4/2018 I. Purpose: The Respiratory Care Practitioner (RCP) will utilize the following protocol to select and initiate bronchial hygiene therapy to open and maintain obstructed airways when indicated. II. Patients: All patients who are ordered bronchial hygiene therapy. III. Clinical Area: All general patient floors IV. Protocol: The following conditions or diseases are indications for bronchial hygiene therapy. L. Oscillating PEP Therapy Indications should include:  
9. Atelectasis caused by mucus plugging or foreign body 10. Chronic mucociliary clearance disorders 11. Retained secretions which may be associated with the following conditions: 
p. Bronchitis 
q. Bronchiectasis 
r. Pneumonia M. PAP- Positive airway pressure therapy Indications include: 7. Patients with post-operative atelectasis or to prevent post operative atelectasis. 8. Patients who cannot perform deep breathing exercises due to pain. 9. Patients requiring lung expansion therapy who cannot follow instructions. 10. Patients requiring lung expansion therapy with poor inspiratory capacity <10cc/kg. 11. Patients requiring aerosol therapy in conjunction with opening their airways. N. Vibratory / Acoustical Airway Clearance Therapy (ACT)- i.e. (Vibralung, Vest, or Percussor) Indications should include 12. Patient conditions  that involve retained secretions, increased mucus production and defective mucociliary clearance such as: 
h. Cystic fibrosis 
i. Chronic bronchitis 
j. Bronchiectasis 
k. Pneumonia 
l. Asthma 
m. Muscular dystrophy 
n. Post-operative atelectasis 
o. Neuromuscular respiratory impairments 
p. ACT may be considered in patients with COPD with 
symptomatic secretion retention, guided by patient preference, 
toleration, and effectiveness of therapy Davidsteph Cervantes et al., 2013). O. Nasotracheal suctioning indications should include: 
8. Inability to cough effectively 9. Excessive secretions 10. Artificial airway V. Equipment: A. PEP therapy device B. Vest therapy equipment Mary ENAMORADO NT suction equipment VI. Guidelines:   Monitor patient's vital signs and evaluate patient's clinical status. The need to  
                             change therapy modality may be indicated by: Stas Byrd in patient's sensorium (patient now confused or obtunded, and unable to follow directions). I. A significant deterioration is evident on patient's chest radiograph or increased sputum production. J. Increased thickening of secretions (e.g. mucolytic therapy may be indicated.) K. Development of wheezing L. Decrease in oxygen saturation M. Development of chest pain. VII. Clinical Responsibility: The Therapy Assessment Protocol guidelines will be used to  
             re-evaluate all patients on bronchial hygiene therapy (See Therapy Assessment Protocol). N. RCP's will perform changes in therapy according to protocol. Tommas Baptise O. Bronchial hygiene therapy may be discontinued when goals of therapy are met, e.g., secretions easily expectorated for 48 hours, atelectasis is resolved, etc. 
P. PAP Therapy may be utilized in place of IPPB therapy per discretion of the RCP, as approved by the Pulmonary Medicine and [facility] Nicol[facility] Rivera. VIII. Outcome Criteria:  Outcome criteria for bronchial hygiene therapy should include: M. Decrease in sputum production N. Improved breath sounds O. Improved arterial oxygen tension and/or SaO2 P. Improved chest X-ray Q. Subjective response to therapy IX. Documentation G. Document assessment findings in the respiratory assessment section of the patient's EMR. H. Document changes in therapy per protocol in the respiratory orders section and in the care plan section of the patient's EMR. I. Document patient education in the patient education section of the patient's EMR. X. Related Protocols: 3. Respiratory Patient Care Assessment Protocols 2. Aerosolized Medication Protocol 2. Oxygen Therapy Protocol Reference: L - Respiratory Care Department Policy, Procedure and Protocol Guideline Manual, Clarissa, POLO Renae. L - Therapist Driven Respiratory Care Protocols  A Practitioner's Guide for Criteria-Based Respiratory Care by Hung Thompson M.D., and POLO Quezada RRT. N  [facility] Clinical Practice Guidelines. Poonam Jerome., Svitlana Jackson., Regla Suresh., Sravani Lozano., Mary Alice Lozada., . . . MONISHA Kelly (2013, December). [facility] Clinical Practice Guideline: Effectiveness of Nonpharmacologic Airway Clearance Therapies in Hospitalized Patients. Respiratory Care, 5812), 5932-4389. Retrieved June 28, 2019 Guideline Guideline Number: -[id number] Title: Management of the Patient with Mechanical Ventilation (including weaning) and ABCDE Bundle Effective Date:  03/00 Revised Date: 02/09, 03/10, 7/12, 5/13,                                  10/13, 8/14 Reviewed Date: 07/2015, 04/2016, 06/2017 I. Policy:  Management of the patient requiring mechanical ventilation, including readiness to wean and weaning protocol. The information provided serves as a guideline for patient management. Included in this guidelines is the ABCDE Bundle to provide guidance to staff for evidence based management of pain, agitation/anxiety and delirium in the intensive care unit. The goals of critical care analgesia and sedation are to facilitate mechanical ventilation, to prevent patient and caregiver injury, and to avoid the psychological and physiologic consequences of inadequate treatment of pain, anxiety, agitation, and delirium by maintaining a light level of sedation. Pain occurs commonly in adult ICU patients, regardless of admitting diagnosis. Therefore, pain should be frequently assessed and analgesic medications titrated to prevent adverse effects associated with either inadequate or excessive analgesia. Once pain has been addressed, anxiolytic and/or antipsychotic medications can be utilized to treat unresolved agitation/anxiety and delirium, with the goal to prevent over- or under sedation by using the Caruso Agitation Sedation Scale (RASS). Assertive management of these issues has been shown to reduce costs, improve ICU outcomes such as successful extubation and ICU length of stay, and allow for patients to participate in their own care. II. Purpose: The respiratory care practitioner and the critical care RN will utilize the following guideline to provide the most efficient and effective management of mechanical ventilators and weaning and extubation processes. Goals of Treatment: 1. Adequate management of patients pain and discomfort while maintaining a light level of                   sedation (RASS score of 0 to -1) 2. Both chronic and acute sources of pain should be identified and treated. 3. Sedative agents should be considered if patient still expresses discomfort and/or is not at RASS         goal of 0 to -1 despite adequate management of pain. 4. Patients requiring neuromuscular blockage must have continuous infusions of analgesic and              sedative agents. III. Responsibility: Director Respiratory Care Services and all Respiratory Care Practitioners  with documented competency as well as Critical Care RN staff. General Guidelines 1. Introduction to Ventilator Plan Phase 
a. Ventilator Management Phase, General Statement -  The plan should be initiated in patients who have a secure airway/require invasive mechanical ventilation (endotracheal or tracheostomy) only -   The provider determines the appropriate medications used for analgesia and agitation/anxiety along with the clinical pharmacist 
 
2. Monitoring Levels of Comfort 
a. Pain Assessment 
- Pain is monitored using the numerical scales - A pain assessment should be conducted, at a minimum, every 4 hours and as needed and per guidelines. - The level of pain should be determined as satisfactory by the patient based on patients baseline level of pain , considering any chronic pain that the patient may have. - If the patient is unable to communicate pain level, the nurse can assess for nonverbal indicators including facial grimacing, moaning, tachypnea, tachycardia, hypertension, diaphoresis, etc as a cue to begin further pain assessment. b.  Sedation Assessment - Sedation is monitored using the Caruso Agitation Sedation Scale (RASS) Target RASS RASS Description +4 Combative, violent, danger to staff 
  
+3 Pulls or removes tubes(s) or catheters; aggressive +2 Frequent nonpurposeful movement, fights ventilator +1 Anxious, apprehensive, but not aggressive  
0 Alert and calm  
-1 Awakens to voice (eye opening/contact) > 10 sec  
-2 Light sedation, briefly awakens to voice (eye opening/contact) < 10 sec  
-3 Moderate sedation, movement or eye opening. No eye contact  
-4 Deep sedation, no response to voice, but movement or eye opening to physical stimulation  
-5 Unarousable, no response to voice or physical stimulation  
 
-  Goal RASS is 0 to -1, unless otherwise specified by providers order. 
- Nursing staff should conduct the RASS every 4 hours and as needed to maintain goal RASS of 0 to -1. 
- If RASS is outside of goal range, discuss treatment options with provider. 
- A RASS score of +2 to +4 requires further assessment by the nurse. Causes of agitation/anxiety that should be considered include: 
a. Pulmonary -   endotracheal tube malposition or patency, mode of ventilation, pneumothorax, hypoxemia, hypercarbia 
b. Metabolic  hypoglycemia, hyponatremia, acute renal or hepatic failure 
c. Emotional upset  with information and awareness of critical condition, prognosis, need for surgical or invasive procedures, other interventions or complications, family or personal stressors 
- C. Sedation Assessment while using Neuromusclar Blocking Agents 
- D. Delirium Assessment 
a. the ICU (CAM  ICU) 3. Analgesia The incidence of significant pain has been reported to be 50% or higher in both medical and surgical ICU patients. These patients also experience discomfort during routine/procedural ICU care and at rest.  However, patients may be unable to self-report their pain (either verbally or with other signs) because of an altered level of consciousness, the use of mechanical ventilation, or high doses of sedative agents or neuromuscular blocking agents. The short and long term consequences of unrelieved or inadequately treated pain are significant and include patient discomfort, decreased satisfaction with care by family and patient, delirium, agitation/anxiety, post traumatic stress disorder and depression. Therefore, routine assessment and treatment of pain should occur in all ICU patients. Causes and Treatment of Pain in the ICU 
a. Acute pain (post-operative, procedural pain, discomfort with usual ICU care or other acute episodes of pain-related to underlying disease) 1. Consider use of PCA for alert and oriented patients with pain needs not met by PRN dosing or opoids. 2. Preemptive analgesia should occur prior to chest tube removal, and should be considered for other procedural pain such as turning and repositioning, would drain removal, wound dressing change, tracheal suctioning, femoral catheter removal or place of central venous catheter. 3. Appropriate analgesic medications for preemptive analgesia are short acting intravenous (IV) agents (i.e. fentanyl, morphine, hydromorphone) a. Administration of analgesia before patient experiences noxious stimuli prevents amplification and hyperexcitability of the central nervous system. b. Analgesia for Mechanically Ventilated Patients: 
1. The approach to sedation and analgesia management for mechanically ventilated patients favors use of analgesia first sedation. The primary goal of this strategy is to address pain and discomfort first, and then if necessary, add anxiolytic agent. 2. Analgesia first sedation reduces dose escalation of medications, decreases the duration of mechanical ventilation and the incidence of VAP, improves the probability of successful extubation, and ultimately shortens ICU length of stay. 3. For pain management, analgesic medications are determined by the provider. Intermittent dosing of the analgesic should be attempted first.   
If the patient requires more than 3 doses within 1 hour then provider should be contacted to consider continuous infusion. 4.  Analgesic options for mechanically ventilated patients include: 
a. Fentanyl which is considered the drug of choice for patients requiring continuous infusion. b.Morphine may be considered for those patients without renal dysfunction who are hemodynamically stable and require intermittent pain medication. Continuous infusions of morphine may be used for patientl who are receiving comfort care as part of end of life care. c.Hydromorphone is reserved for patients who are refractory to fentanyl or morphine and is typically admininstered by intermittent dosing. 4.  Agitation/Anxiety Background Agitation and anxiety frequently occur in ICU patients. Anxiolytic/sedation agents may be indicated to help relieve discomfort, improve synchrony with mechanical ventilation, and decrease the overall work of breathing. Pain control alone may be sufficient to make patients comfortable enough to require no anxiolytic/sedative agent. In addition, non-pharmacologic interventions such as repositioning or verbal assurance may be helpful to comfort or redirect an agitated patient. If these methods are unsuccessful, then anxiolytic/sedative medications such as propofol, dexmedetomidine, or benzodiazepines can be used.  Selection of an anxiolytic should be based on the pharmacokinetic properties of the medication, patient specific characteristics, and sedation goal. However, nonbenzodiazepine sedatives (ie propofol or dexmedetomidine) may be preferable over benzodiazepines (ie midazolam or larazepam) due to more favorable outcomes such as delirum. Causes and Treatment of Agitation/Anxiety 
a. Possible underlying causes of agitation and anxiety include pain, delirium, hypoxemia, hypoglycemia, hypotension, or withdrawal from alcohol  and other drugs. b. Analgesia first sedation should be attempted initially to manage pain and provide sedation in appropriate patients. Analgesia alone may be adequate to reach RASS goal of 0 to -1. If patient remains agitated or anxious despite adequate analgesia (ie RASS +2 to +4) then anxiolytic/sedative should be considered. c. The choice of anxiolytic should be based on desired levels of sedation (ie light sedation or deep sedation) with preference for the use of nonbenzodiazepines such as propofol or dexmedetomidine if appropriate. While light sedation (ie RASS 0 to -1) is preferred for most patients, there are instances when deep sedation (ie RASS -4 to -5) is desired. For example, in the setting of ventilator dysynchrony due to ARDS or for patients receiving NMB agents. d. Medications to maintain light sedation (ie RASS 0 to -1) include 1. Propofol continuous infusion can be considered for hemodynamically stable (ie SBP = 100, MAP = 65 and/or not requiring vasopressor support) patients requiring light sedation. Propofol has a quick onset (1-2 minutes) and offset of action, making it a good agent to assess neurological status and facilitate liberation from the mechanical ventilator. 2.Dexmedetomidine continuous infusion is a good option for hemodynamically stable patients requiring light sedation as it allows for a more awake, interactive patient is associated with less delirium. It has an intermediate onset of action (5-10 min).   Therefore, abrupt titrations should be avoided, but use of prn haloperidol or benzodiazepine may be useful to manage agitation until the medication takes effect. 3. Antipsychotics are another option. In particular, haloperidol intermittently dosed may be useful for patients with symptoms of agitation/anxiety and delirium. 4.Benzodiazapines can also be considered for light sedation, but should be intermittently doses. Midazolam is an option for patients without renal dysfunction. It has a short onset of action (2-5 minutes) making it a good agent for acute agitation/anxiety, but short duration of action resulting in frequent dosing. Lorazepam is another option. It has a longer onset of action (15-20 minutes) in comparison to midazolam, but longer duration of action. e. Medications to maintain deep sedation (RASS -4 to -5) include: 
1) Propofol continuous infusion should be considered as a first line option for hemodynamically stable patients. 2)Benzodiazepines can be considered as second line options for deep sedation. Studies comparing these agents to other sedatives have shown that they lead to worse outcomes including delirium, oversedation, delayed extubation, and longer time to discharge. Midazolam is one option for patients without renal dysfunction and lorazepam is another options. If patient requires more than 3 doses within 1 hour then contact provider  to consider initiation of continuous infusion. 5.  Daily Sedation Awakening Trial (SAT) from IV Continuous Analgesia/Sedation 
a. Patients are to have daily awakening from sedation while on continuous IV analgesia and/or sedation in the ICU. Continuous analgesia infusions may be maintained only if needed for active pain and RASS is at goal 0 - -1. Unit guideline is for the SAT to occur following ICP rounds each morning.   
b. The sedation awakening trial (SAT) is done regardless if the patient meets criteria for spontaneous breathing trial (SBT).  
c. SAT safety screen is assessed and SAT should not be performed if sedation is being used for active seizures, alcohol withdrawal, hemodynamically unstable or requiring support of vasoactive medications , in conjunction with NMB agents, if ICP is greater than 
20mmHg or if sedation is being used to control ICP, patients RASS is +3 or +4 (very agitated or combative). Other exclusion criteria are:  if there is documentation of myocardial ischemia in the past 24 hours; or patient is receiving high frequency oscillator ventilation (HFOV) , if the patient has an open chest /abdomen or is receiving comfort care. d. Criteria for passing the SAT are the patient opened their eyes to verbal stimuli or tolerated sedative interruption without exhibiting failure criteria. 
e. Patients fail the SAT if the develop sustained anxiety, agitation, or pain; a respiratory rate of 35 per minutes for 5 minutes or longer, an SpO2 less than 88% for 5 minutes or longer; an acute cardiac dysrhythmia; two or more signs of respiratory distress including tachycardia, bradycardia; use of accessory muscles; diaphoresis; marked dyspnea; or myocardial ischemia. f. Respiratory therapy staff must verify with the nurse that continuous IV analgesia (unless being use  for active pain) and sedation (unless patient is receiving dexmedetomidine) is off prior to placing patient on SBT. g. DO NOT interrupt infusion of analgesia and sedation medications if patient is receiving neuromuscular blockade. 
h. Monitor level of wakefulness unless patient is awake and follows commands (RASS 0 to -1) or patient becomes uncomfortable or agitated (RASS +3 to +4) 
i. If agitation prevents successful awakening , administer bolus of analgesia and/or sedation then resume infusion of the medication at ½ previous dose and titrate as needed. 
j. If oversedation prevents successful awakening, hole infusion until at goal and resume ½ of prior infusion rate/dose if clinically indicated. 6. Delirium Background Delirium is characterized by the acute onset of cerebral dysfunction with a change or fluctuation baseline 
mental status, inattention, and either disorganized thinking or an altered level of consciousness. It affects up to 80% of mechanically ventilated adult ICU patients, and is associated with increased mortality,  
and treatment is important and may in turn allow for a patient to be conscious yet cooperative enough to participate  
in ventilator weaning trials and early mobilization efforts. Delirium can only be assessed in patients who are able to sufficiently interact and communicate with bedside 
clinicians (ie RASS -3 to +4). IV. Procedure: A. Assessment: The following criteria must be assessed prior to the initiation of weaning from mechanical ventilation. Note: The criteria are general guidelines and must be individualized for each patient. The patients primary nurse will be responsible, in coordination with the RT, the Spontaneous Awakening Trial). The RT will perform the SBT. B. Spontaneous Awakening Trials (SATs  also referred to as Sedation Vacation) and Spontaneous Breathing Trials (SBTs) performed to determine readiness to wean. 1. For patients who meet established criteria, such as those without active seizures, alcohol withdrawal and agitation, myocardial ischemia or those requiring cardiac support devices, without increased intracranial pressure and those not receiving neuromuscular blockade, the nurse will reduce the infusions of sedative by 50% of current used for sedation that was used to achieve a level of light sedation (Anne Score 2 or RASS score of 0 to -1) and evaluate patient response to reduction of sedation. Analgesics required for pain control are continued during the test.  Obtain MD order to cover no SAT for that time period if patient has any exclusion criteria as described above.  
 
2. Failure of the spontaneous awakening trial occurs when the patient shows symptoms such as increased agitation, anxiety, pain or signs of respiratory distress including respiratory rate >35/min or oxygen saturation <88% as well as development of acute cardiac arrhythmias. If these symptoms develop during the SAT, the nurse then restarts sedation at 75% of the previous dose and titrates the medications until the patient is comfortable and/or symptoms have abated. 3.  If the patient passes the SAT then the patient moves on to the Spontaneous Breathing Trials as performed by the RT. The SBT Safety Screen included the following:  No agitation, oxygen saturation > 88%, FIO2 < 50%, PEEP < 7.5 cm H20, no myocardial ischemia, no vasopressor use, and with inspiratory efforts. 4. Patients who pass the spontaneous awakening trial but fail the spontaneous breathing trial are placed back on full ventilator support and reassessed the next day. 5. Failure of the SBT (spontaneous breathing trail) includes any of the following:  Respiratory rate > 35/min, respiratory rate < 8/min, oxygen saturation < 88%, respiratory distress, mental status change, acute cardiac arrhythmia. 6. Extubation is considered for patients who tolerate the spontaneous awakening trial and pass the spontaneous breathing trial.   
C. Can the cause of respiratory failure be reversed (i.e. absence of high spinal cord injury or advanced ALS)? D. Is gas exchange adequate? 1. PaO2/FIO2 ratio > 150  200, 2. PEEP < 8 cm H20 
3. FIO2 < 50 
4. pH > 7.30 
5. Rapid shallow breathing index (f/VT) < 105 
E. Is patient hemodynamically stable? 1. Absence of clinically significant hypotension (minimal vasopressors such as Dopamine < 5mcg/kg/minute)? F. Is there evidence of intact respiratory drive (NIP/NIF >-82 BBN70, stable VC02)? G. Does patient have an adequate cough, airway clearance ability? H. Is there absence of excessive secretions? V. Initiation: A. The therapist shall consult with RN to determine if sedation can be discontinued or significantly decreased. If this can be achieved, the therapist shall implement the  
                  followin. Identify patient and verify name and account number via ID bracelet. 2. Perform hand hygiene per hospital policy utilizing Standard Precautions for all patients and following transmission-based isolation as indicated per hospital policy. 3. Perform a ONE-MINUTE SPONTANEOUS TRIAL AND ASSESSMENT. 4. Measure Rapid Shallow Breathing Index (RSBI) and monitor SpO2 and cardiovascular parameters during the spontaneous breathing assessment. 5. If SpO2 and cardiovascular parameters are stable, continue spontaneous breathing trial for at least 30 minutes and up to 120 minutes, as patient tolerates. 6. Monitor ventilatory status, SpO2, and cardiovascular status during spontaneous breathing trial. 
7. If patient has a successful trial, consider patient as a candidate for extubation and obtain order. 8. If patient fails the weaning trial, place back on ventilator and adjust settings to provide a non-fatiguing form of ventilatory support for the remainder of the day and night. 9. One attempt at weaning shall be performed each day until successful weaning occurs. The RCP will make every attempt to begin the spontaneous breathing trials between 0500 and 0600 to provide documentation of the trial when the pulmonologist makes rounds. B.  Assessment of SBT or PST: 
1. Is gas exchange acceptable? 2. PaO2 > 60 mm Hg. 3. PH > 7.30 
4. Increase in PaCO2  < 10 mm Hg C. Is patient hemodynamically stable? 1. HR < 120 beats/minute 2. HR  < 20% 3. Systolic BP < 321 and > 90 mmHg 4. BP  < 20%, no vasopressors required D. Does patient have stable ventilatory pattern? 1. Sustained RR < 30 breaths per minute 2. Normal and stable VCO2 3.  Patient is not demonstrating any signs of increased work of breathing, such as increased use of accessory muscles. E. Mental status stable throughout trial? 
1. Absence of changes such as somnolence, excessive agitation or anxiety 2. Absence of diaphoresis during trial? 
IV. Safety: A. The RCP shall monitor patient according to the above guidelines. If at any time during the weaning process, the respiratory therapist or nurse feels that the patient is not tolerating weaning, the therapist shall place patient back on previous ventilator settings. B. The patient shall be reassessed and the weaning process should be continued the following day. V. Reportable Conditions: A. The therapist shall notify the physician, as appropriate, for any of the following         conditions: 1. FIO2 increase (sustained) at 10% or greater 2. Poor patient/ventilator interface in spite of adjustments 3. Need for increased sedation for respiratory distress 4. Need for increasing ventilating pressures (i.e. PEEP, PIP, MAP) 5. ABG results meeting panic value criteria or other clinical signs indicating deterioration of patients condition. 6. Unplanned extubation. 7. Unexplained sustained increase in PIP greater than 10 cm H2O. 
8. Assessment results regarding ventilator discontinuance process. VI. Ventilator protocol management A. The following items should be maintained for patients who are being mechanically           ventilated. 1. Obtain STAT Chest X-Ray for ET tube placement after insertion. 2. Chest X-Ray q a.m. while on ventilator. 3. ABGs 30 - 60 minutes after being stable on the ventilator. 4. ABG's q a.m. while on ventilator and prn. 
5. Do spontaneous breathing trials when patient is hemodynamically stable, responsive, and without fever. 6. Terminate trials if patient exhibits signs of respiratory distress. 7. Therapists should maintain ABG s as follows: 
    a. pH -  7.30 - 7.50              
    b. PaO2 -   60  100 8.    Racemic Epinephrine (0.5cc) for post extubation stridor (2 UA treatments max.) 
 
VII. Early Mobilization Mobility Level Criteria Start at Level 1 if:  
PaO2/FIO2 <250 Positive end-expiratory Pressure (PEEP) >=10 cm H2O  
O2 saturation <90% Respiratory Rate (RR) Not within 10-30 per min Cardiac arrhythmias or ischemia New onset Heart Rate  (HR) <60 or >120 beats per min Mean arterial pressure (MAP) <55 or >140 mmHg Systolic blood pressure (SBP) <90 or > 180 mmHg Vasopressor infusion New or increasing Caruso Agitation Scale (RASS) < - 3 Level I:   Breathe  (Rass -5 to -3) HOB Angle  improve VAP protocol compliance ? Visually confirm the [address],[place] [address] is elevated >= 30 degrees to comply with VAP prevention protocols ? The Centers for Disease Control and Prevention recommends an HOB angle of 30-45 degrees , unless contraindicated Additional activities to be implemented ? Every 2 hour turning ? Passive range of motion ? Up to 20 degrees reverse trendelenburg with lower extremity exercise/retracting footboard ? Continuous lateral rotation therapy can be considered part of early mobility therapy in patients who are at high risk for pulmonary complications Move to Level 2 when the patient 
- Has acceptable oxygenation/hemodynamics - Tolerates q 2 turning - Tolerates HOB > 30 degrees or up to 20 degrees reverse trendelenburg Level 2 :Tilt  Patient Assessment Rass > -3  (eg, opens eyes, may have profound weakness) Up to 20 degrees Reverse Trendelenburg position and 10 degrees minimum HOB 
- Reverse Trendelenburg positioning allows for orthostatic position in fragile patients - If available , use in conjunction with retracting foot section to allow for partial weight bearing prior to sitting up in the bed or getting out of bed Additional activities to be implemented -  Maintain HOB >/= 30 degrees - Q 2 hour turning - Passive/active range of motion - Legs dependent - PT consultation Move to Level 3 when the patient . Danae Staggers -Tolerates active- assistance exercises 2 times per day 
  -Tolerates lower extremity exercises against footboard/Up to 20 degrees Reverse Trendelenburg 
  -Tolerates legs dependent /HOB 45 degrees Level 3 :  SIT  (Rass >- 1 (eg , weak but may move arms/legs independently) Full chair position (footboard on) ? Full upright positioning allows for diaphragmatic excursion and lung expansion ? Sitting with legs in a dependent position facilitates gas exchange Additional activities to be implemented - Maintain HOB >= 30 degrees - Q 2 hour turning (assisted) - Active range of motion  PT/OT actively involved - Encourage activities of daily living 
- Dangling, if patient can move arm against gravity Move to Level 4 when the patient . Danae Staggers - Tolerates increasing active exercise in bed - Actively assists with every- 2- hour turning or turns independently - Tolerates full chair position 3 times/day Level 4:  Stand ( RASS >0 (eg, weak but may tolerate increased activity) Stand Attempts ? Full chair position (footboard off/feet on the floor) ? Consider using a sit-to-stand lift ? Pivot to chair, it tolerates partial weight bearing Additional activities to be implemented - Maintain head of bed >= 30 degrees - Q 2 hr turning (self/assisted) - Active range of motion - Encourage activities of daily living 
- PT/OT actively involved Move to Level 5 when the patient . 
- Can successfully comply with all activities - Tolerates trial periods of full chair position (footboard off/feet on floor) 3 times per day - Tolerates partial weight-bearing stand and pivots to chair Level 5 :  Move  (RASS > 0    (eg, weak but may tolerate increased activity) Achieve out of bed ? Utilize mobile floor life to ambulate to bedside chair Additional activities to be implemented - Maintain HOB > = 30 degrees - Q 2 hour turning (self/assisted) - Active range of motion - Patient stands/bears weight > 1 minute - Patient marches in place 
- PT/OT actively involved Patient continues to ambulate progressively longer distances as tolerated until they consistently participate and move independently. E Approved by Critical Care Committee 2-19-09 [facility] Clinical Guidelines ABCDE DOC Flowsheet Content Variables to select when addressing section Comments ABCDE Initiated ? Yes/No  RN to address minimum q 24 hours (day shift) Target RASS ? 0 = alert and oriented ? -1 = drowsy ? -2 = light sedation ? -3= moderate sedation ? -4= deep sedation Target on standard ventilator setting should be -2; -4 with oscillator CAM -ICU ? Positive ? Negative ? Unable to assess Delirium assessment SAT Safety Screen Passed ? Yes 
? No Select yes if proceeding on to the sedation vacation (reduction of continuous sedative drip by directed by MD) Select no if your patient has any of the below reasons for not proceeding on to the daily awakening sedation vacation trial  
SAT Screen for Failure ? Active seizures ? Acute delirium tremors ? Agitation that threatens accidental line/tube removal 
? On paralytics ? MI (24-48hr) ? Abnormal ICP ? Open abdomen Select one of the options when the patient will not undergo the sedation vacation  ALSO MUST OBTAIN AN ORDER FOR no Page Ochoa written under nursing miscellaneous for now by either the NP or Intensivist  
Daily sedation Vacation/assessment of  ? Yes 
? No 
? Not applicable If yes, MUST see the reduction in sedation on the [facility] and please place in the comment section of the sedative sedation vacation started SBT Safety Screen Passed ? Yes 
? No Select Yes if the patient has none of the below listed reasons for not proceeding on to the SBT following reduction of sedation SBT Screen Reason for Failure ? Agitation ? O2 Sat < or = 88% 
? FIO2 > 50% ? PEEP >7 
?  MI 
 ? Vasopressor Use 
? Bilevel setting on Vent ? Oscillator in use ? Increased resp effort Select reason as appropriate for NOT proceeding on to the SBT Wake Up and Breathe Protocol

## 2020-11-04 NOTE — PROGRESS NOTES
Comprehensive Nutrition Assessment Type and Reason for Visit: Reassess, TPN Management for [facility] Pulmonary. Verbal Nutrition Consult during IDT rounds for TF Management. Ronold Kanner Nutrition Recommendations/Plan: 1) Continue TPN as 2 liters/86 ml/hr - 4.25%AA/10%DEX, stop lipids (1020 calories/day, 85 gm protein/day in ~2000 ml volume/day). 2) Additives/liter: 60 sodium (40 chloride, 20 phosphate), 10 potassium (chloride), 4.5 calcium, 5 magnesium, 20 phosphorus (sodium), 5 insulin. 3) MVI and MTE daily. 4) Start TF via OGT with 10 ml/hr Glucerna 1.5 with a 10 ml/hr water flush (360 calories/day, 20 gm protein/day in 422 ml water/day). 6) Start POC glucose and SSI coverage every 6 hours. Nutrition Assessment:  Pt admitted covid +. PMH notable for HTN, RA, GLENYS on CPAP, ADHD, DM, GERD, neurogenic bladder. Estimated Daily Nutrient Needs: 
Energy (kcal): 4590-2704 kcal/d (11-14 kcal/kg listed .4 kg) Protein (g): >125 gm/d (>2.5 gm/kg IBW/day) Fluid (ml/day): 1 ml/kcal or per MD 
 
Nutrition Related Findings:  11/4: Intubated last evening. OGT placed this morning. Remains ventilated, sedated and TPN-dependent at this time. Diprivan was started last evening and contributes ~650 praveen/day at its current infusion rate. Decadron was started yesterday. Edema: 1+ generalizedand BLEs. Abdomen: semi-soft with active bowel sounds. Last bowel movement: 11/1. Drips: Fentanyl, Versed, Diprivan. Meds: Decadron. Labs: potassium, AM glucose 191. Current Nutrition Therapies: 
No diet orders on file Current Parenteral Nutrition Orders: · Type and Formula: 4.25%AA/10%DEX · Lipids: 250ml, Daily · Duration: Continuous · Rate/Volume: 2L/88 ml/hr · Current PN Order Provides: 1520 calories, 88 gm protein/day in 2362 ml volume/day Anthropometric Measures: 
· Height:  5' 2\" (157.5 cm) · Current Body Wt:  103 kg (227 lb 1.2 oz)(CCU bed 11/3/2020) Nutrition Diagnosis: · Inadequate oral intake related to impaired respiratory function as evidenced by intubation, nutrition support-parenteral nutrition, trial enteral nutrition. Nutrition Interventions:  
Food and/or Nutrient Delivery: Continue NPO, Start tube feeding, Modify parenteral nutrition Coordination of Nutrition Care: Interdisciplinary rounds Goals: 
Meet >75% of estimated needs within 7 days. Nutrition Monitoring and Evaluation:  
Food/Nutrient Intake Outcomes: Enteral nutrition intake/tolerance, Parenteral nutrition intake/tolerance Physical Signs/Symptoms Outcomes: Biochemical data, GI status, Hemodynamic status Discharge Planning: Too soon to determine Electronically signed by Bucky Borer Claudetta Junker RD, LD, [address]  on 11/4/2020 at 11:37 AM 
Contact: [phone]

## 2020-11-04 NOTE — PROGRESS NOTES
Critical Care Daily Progress Note: 11/4/2020 Admission Date: 10/30/2020 The patient's chart is reviewed and the patient is discussed with the staff. Patient is [phone] y.o.  female  with past medical history of hypertension, rheumatoid arthritis, obstructive sleep apnea on CPAP and depression/anxiety presented to the ED on 10/30 for worsening of shortness of breath.  Patient reports recent diagnosis of COVID-19 infection 8 days ago.  Symptoms started as fatigue, body aches and shortness of breath.  Patient reports worsening of exertional dyspnea for last 2 days and noted desaturations yesterday in 62s.  She is using CPAP at home and tried it with improvement in saturation.  Denies fever, chills, nausea, vomiting, abdominal pain or diarrhea. She did have loss of taste and smell. 
  
In the ED patient saturated in 80s on room air and was started on oxygen via nasal cannula requiring 6 to 8 L to maintain oxygen level above 92%.  Labs shows WBC 6.1, hemoglobin 13.4, potassium 3, ferritin 348, CRP 11.7 and lactic acid 1.5.  CXR shows diffuse bilateral small airspace consolidations characteristic of pneumonia from COVID-19 Following admission she has received dexamethasone and convalescent plasma but is  Too far out from dx to get remdesivire. She has required increase fio2 since admit and today she is on 94% airvo and sats are low 90s but do drop in the 70s with any movement. Subjective:  
 
Sedated on vent with PEEP 15 cm and FIO2 at 80% Get aggitated easily Current Facility-Administered Medications Medication Dose Route Frequency  dexamethasone (DECADRON) 4 mg/mL injection 6 mg  6 mg IntraVENous DAILY  fentaNYL citrate (PF) injection 50 mcg  50 mcg IntraVENous Q2H PRN  
 TPN ADULT - dextrose 10% amino acid 4.25%   IntraVENous QPM  
 fat emulsion 20% (LIPOSYN, INTRAlipid) infusion 250 mL  250 mL IntraVENous QPM  
  sodium chloride (NS) flush 30 mL  30 mL InterCATHeter Q8H  
 sodium chloride (NS) flush 30 mL  30 mL InterCATHeter PRN  propofol (DIPRIVAN) 10 mg/mL infusion  0-50 mcg/kg/min IntraVENous TITRATE  midazolam in normal saline (VERSED) 1 mg/mL infusion  0-10 mg/hr IntraVENous TITRATE  fentaNYL in normal saline (pf) 25 mcg/mL infusion  0-200 mcg/hr IntraVENous TITRATE  albuterol-ipratropium (DUO-NEB) 2.5 MG-0.5 MG/3 ML  3 mL Nebulization Q4H RT  
 budesonide (PULMICORT) 500 mcg/2 ml nebulizer suspension  500 mcg Nebulization BID RT  
 pantoprazole (PROTONIX) 40 mg in 0.9% sodium chloride 10 mL injection  40 mg IntraVENous Q24H  
 LORazepam (ATIVAN) injection 2 mg  2 mg IntraVENous Q4H PRN  
 ascorbic acid (vitamin C) 1.5 g in dextrose 5% 50 mL IVPB  1.5 g IntraVENous BID  doxycycline (VIBRAMYCIN) 100 mg in 0.9% sodium chloride (MBP/ADV) 100 mL  100 mg IntraVENous Q12H  
 [Held by provider] propranolol LA (INDERAL LA) capsule 120 mg  120 mg Oral QHS  LORazepam (ATIVAN) tablet 0.5 mg  0.5 mg Oral Q6H PRN  
 atorvastatin (LIPITOR) tablet 40 mg  40 mg Oral QHS  [Held by provider] furosemide (LASIX) tablet 40 mg  40 mg Oral DAILY  [Held by provider] hydroCHLOROthiazide (HYDRODIURIL) tablet 25 mg  25 mg Oral DAILY  venlafaxine-SR (EFFEXOR-XR) capsule 150 mg  150 mg Oral DAILY WITH BREAKFAST  zolpidem (AMBIEN) tablet 10 mg  10 mg Oral QHS PRN  
 NUTRITIONAL SUPPORT ELECTROLYTE PRN ORDERS   Does Not Apply PRN  
 HYDROcodone-acetaminophen (NORCO) 5-325 mg per tablet 1 Tab  1 Tab Oral Q4H PRN  
 HYDROcodone-acetaminophen (NORCO)  mg tablet 1 Tab  1 Tab Oral Q4H PRN  
 albuterol (PROVENTIL HFA, VENTOLIN HFA, PROAIR HFA) inhaler 2 Puff  2 Puff Inhalation Q4H PRN  
 alum-mag hydroxide-simeth (MYLANTA) oral suspension 30 mL  30 mL Oral Q4H PRN  
 sodium chloride (NS) flush 5-40 mL  5-40 mL IntraVENous Q8H  
 sodium chloride (NS) flush 5-40 mL  5-40 mL IntraVENous PRN  
  acetaminophen (TYLENOL) tablet 650 mg  650 mg Oral Q6H PRN Or  
 acetaminophen (TYLENOL) suppository 650 mg  650 mg Rectal Q6H PRN  polyethylene glycol (MIRALAX) packet 17 g  17 g Oral DAILY PRN  promethazine (PHENERGAN) tablet 12.5 mg  12.5 mg Oral Q6H PRN Or  
 ondansetron (ZOFRAN) injection 4 mg  4 mg IntraVENous Q6H PRN  
 enoxaparin (LOVENOX) injection 40 mg  40 mg SubCUTAneous Q12H  
 0.9% sodium chloride infusion 250 mL  250 mL IntraVENous PRN  zinc sulfate tablet 220 mg  220 mg Oral DAILY Review of Systems Unobtainable due to patient status. Objective:  
 
Vitals:  
 11/04/20 0339 11/04/20 0901 11/04/20 6342 11/04/20 9979 BP: (!) 118/57 115/60 (!) 115/59 116/61 Pulse: (!) 129 (!) 128 (!) 126 (!) 126 Resp: (!) 34 (!) 31 30 30 Temp: 99.8 °F (37.7 °C) 99.6 °F (37.6 °C) 99.5 °F (37.5 °C) 99.3 °F (37.4 °C) SpO2: 94% 94% 94% 94% Weight:      
Height:      
 
 
 
Intake/Output Summary (Last 24 hours) at 11/4/2020 1110 Last data filed at 11/4/2020 0800 Gross per 24 hour Intake 3391.43 ml Output 875 ml Net 2516.43 ml Physical Exam:         
Constitutional:  intubated and mechanically ventilated. EENMT:  Sclera clear, pupils equal, oral mucosa moist 
Respiratory: CTA Cardiovascular:  RRR with no M,G,R; 
Gastrointestinal:  soft with no tenderness; positive bowel sounds present Musculoskeletal:  warm with no cyanosis, no lower extremity edema Skin:  no jaundice or ecchymosis Neurologic: no gross neuro deficits Psychiatric:  sedated LINES:   
ETT 
PICC 
kapadia DRIPS:   
Versed Fentanyl Diprivan TPN 
 
CXR:   
 
 
 
 
Ventilator Settings Mode FIO2 Rate Tidal Volume Pressure PEEP Pressure control  80 % 34    16 15 cm H20 Peak airway pressure: 28.5 cm H2O Minute ventilation: 16.5 l/min ABG:  
Recent Labs 11/04/20 
0417 11/03/20 
1834 11/03/20 
0245 PHI 7.24* 7.36 7.43  
PCO2I 65.8* 42.6 36.2 PO2I 116* 89 71* HCO3I 28.5* 24.0 24.2 LAB Recent Labs 11/02/20 
1648 GLUCPOC 130* Recent Labs 11/04/20 
5565 11/03/20 
1370 11/02/20 
7831 WBC 19.3* 18.4* 15.8* HGB 11.9 12.5 12.2 HCT 37.3 37.3 37.0 * 159 247 Recent Labs 11/04/20 
2660 11/03/20 
5626 11/02/20 
8517  140 139  
K 5.0 4.2 3.7 * 107 104 CO2 30 29 28 * 140* 114* BUN 17 14 12 CREA 0.54* 0.55* 0.59* MG  --  2.6*  --   
PHOS  --  2.7  --   
CA 7.8* 8.0* 8.2* No results for input(s): LCAD, LAC in the last 72 hours. Patient Active Problem List  
Diagnosis Code  
 HTN (hypertension) I10  
 HLD (hyperlipidemia) E78.5  Fibromyalgia M79.7  RA (rheumatoid arthritis) ([facility]) M06.9  Chronic fatigue R53.82  
 H/O Clostridium difficile infection Z86.19  
 Recurrent UTI N39.0  Asthma J45.909  Depression F32.9  Menopausal state N95.1  Malaise and fatigue R53.81, R53.83  
 GERD (gastroesophageal reflux disease) K21.9  Morbid obesity ([facility]) E66.01  
 GLENYS (obstructive sleep apnea) G47.33  
 Persistent disorder of initiating or maintaining sleep G47.00  
 Urgency of urination R39.15  Dyslipidemia E78.5  Essential hypertension I10  
 Palpitations R00.2  Allergic rhinitis J30.9  Eczema L30.9  Edema R60.9  Abnormal liver function tests R94.5  Essential tremor G25.0  Irritable bowel syndrome K58.9  Lateral epicondylitis M77.10  
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  
 PTSD (post-traumatic stress disorder) F43.10  
 Osteoarthritis M19.90  Obesity, morbid (more than 100 lbs over ideal weight or BMI > 40) ([facility]) E66.01  
 Kidney stones N20.0  Heart palpitations R00.2  Diabetes ([address] E11.9  Cystitis N30.90  Chronic pain G89.29  
 ADHD (attention deficit hyperactivity disorder) F90.9  Incisional hernia K43.2  Acute hypoxemic respiratory failure due to COVID-19 (HCC) U07.1, J96.01  
 COVID-19 U07.1  Pneumonia due to COVID-19 virus U07.1, J12.89  
 Acute respiratory distress syndrome (ARDS) due to COVID-19 virus (HCC) U07.1, J80 Assessment:  (Medical Decision Making) Hospital Problems  Date Reviewed: 10/21/2020 Codes Class Noted POA Acute respiratory distress syndrome (ARDS) due to COVID-19 virus [facility]) ICD-10-CM: U07.1, J80 
ICD-9-CM: 518.82, 079.89  11/4/2020 Yes Pneumonia due to COVID-19 virus ICD-10-CM: U07.1, J12.89 ICD-9-CM: 480.8  11/1/2020 Unknown * (Principal) Acute hypoxemic respiratory failure due to COVID-19 [facility]) ICD-10-CM: U07.1, J96.01 
ICD-9-CM: 518.81, 079.89, 799.02  10/30/2020 Yes COVID-19 ICD-10-CM: U07.1 ICD-9-CM: 079.89  10/30/2020 Yes Obesity, morbid (more than 100 lbs over ideal weight or BMI > 40) (HCC) ICD-10-CM: E66.01 
ICD-9-CM: 278.01  Unknown Yes Overview Signed 7/20/2020 10:50 AM by Kush Bruno MD  
  BMI 48, HTN, need large BP cuff. GLENYS (obstructive sleep apnea) (Chronic) ICD-10-CM: G47.33 
ICD-9-CM: 327.23  7/21/2015 Yes HTN (hypertension) (Chronic) ICD-10-CM: I10 
ICD-9-CM: 401.9  12/14/2012 Yes Overview Signed 12/17/2012  9:09 AM by Kush Bruno MD  
  The patient is stable on the current medications. Tolerating well. No sides effects. Will not adjust at this time. HLD (hyperlipidemia) (Chronic) ICD-10-CM: Z89.2 ICD-9-CM: 272.4  12/14/2012 Yes Overview Signed 12/17/2012  9:09 AM by Kush Bruno MD  
  The patient is stable on the current medications. Tolerating well. No sides effects. Will not adjust at this time. Fibromyalgia (Chronic) ICD-10-CM: M79.7 ICD-9-CM: 729.1  12/14/2012 Yes Overview Signed 12/17/2012  9:10 AM by Javier Sy MD  
  The patient is stable on the current medications. Tolerating well. No sides effects. Will not adjust at this time. RA (rheumatoid arthritis) (HCC) (Chronic) ICD-10-CM: M06.9 ICD-9-CM: 714.0  12/14/2012 Yes Overview Signed 12/17/2012  9:11 AM by Javier Sy MD  
  The patient is stable on the current medications. Tolerating well. No sides effects. Will not adjust at this time. Followed by Dr. Mariangel Santana. Plan:  (Medical Decision Making) -- adjust vent to correct respiratory acidosis -- insert OGT for TF 
--adequate sedation 
--Decadron for total 10 days 
--IV Protonix --Lovenox 40 mg Q 12 hrs 
--cont. TPN for now 
--updated her souse by phone [phone] The patient is critically ill with respiratory failure, circulatory failure and requires high complexity decision making for assessment and support including frequent ventilator adjustment , frequent evaluation and titration of therapies , application of advanced monitoring technologies and extensive interpretation of multiple databases Cumulative time devoted to patient care services by me for day of service -38 min More than 50% of the time documented was spent in face-to-face contact with the patient and in the care of the patient on the floor/unit where the patient is located.  
 
Archie Johnson MD

## 2020-11-04 NOTE — PROGRESS NOTES
Bedside and Verbal shift change report given to [address] [facility] and Jeovany Montanez RN  (oncoming nurse) by Amira Angulo (offgoing nurse). Report included the following information SBAR, MAR and Recent Results. Versed, Fentanyl gtt verified.

## 2020-11-05 PROBLEM — E87.5 HYPERKALEMIA: Status: ACTIVE | Noted: 2020-01-01

## 2020-11-05 PROBLEM — R00.0 TACHYCARDIA: Status: ACTIVE | Noted: 2020-01-01

## 2020-11-05 NOTE — PROGRESS NOTES
Critical Care Daily Progress Note: 11/5/2020 Admission Date: 10/30/2020 The patient's chart is reviewed and the patient is discussed with the staff. Patient is [phone] y.o.  female  with past medical history of hypertension, rheumatoid arthritis, obstructive sleep apnea on CPAP and depression/anxiety presented to the ED on 10/30 for worsening of shortness of breath.  Patient reports recent diagnosis of COVID-19 infection 8 days ago.  Symptoms started as fatigue, body aches and shortness of breath.  Patient reports worsening of exertional dyspnea for last 2 days and noted desaturations yesterday in 62s.  She is using CPAP at home and tried it with improvement in saturation.  Denies fever, chills, nausea, vomiting, abdominal pain or diarrhea. She did have loss of taste and smell. 
  
In the ED patient saturated in 80s on room air and was started on oxygen via nasal cannula requiring 6 to 8 L to maintain oxygen level above 92%.  Labs shows WBC 6.1, hemoglobin 13.4, potassium 3, ferritin 348, CRP 11.7 and lactic acid 1.5.  CXR shows diffuse bilateral small airspace consolidations characteristic of pneumonia from COVID-19 Following admission she has received dexamethasone and convalescent plasma but is  Too far out from dx to get remdesivire. She has required increase fio2 since admit and today she is on 94% airvo and sats are low 90s but do drop in the 70s with any movement. Subjective: Intubated on 11/3. Sedated on vent with PEEP 15 cm and FIO2 at 80% and PC 16 with Vts running about 440. Tachycardic but not febrile. K up to 6.8 last night. Got calcium, glucose, and insulin. TPN stopped. Was on LA BB PTA and now tachycardic off Rx. Current Facility-Administered Medications Medication Dose Route Frequency  midazolam in normal saline (VERSED) 1 mg/mL infusion  0-10 mg/hr IntraVENous TITRATE  fentaNYL in normal saline (pf) 25 mcg/mL infusion  0-200 mcg/hr IntraVENous TITRATE  propofol (DIPRIVAN) 10 mg/mL infusion  0-50 mcg/kg/min IntraVENous TITRATE  white petrolatum-mineral oiL (AKWA TEARS) 83-15 % ophthalmic ointment   Both Eyes Q4H PRN  
 [START ON 11/6/2020] venlafaxine (EFFEXOR) tablet 75 mg  75 mg Per NG tube BID  atorvastatin (LIPITOR) tablet 40 mg  40 mg Per NG tube QHS  [START ON 11/6/2020] ascorbic acid (vitamin C) (VITAMIN C) tablet 1,000 mg  1,000 mg Per NG tube BID  [START ON 11/6/2020] zinc sulfate tablet 220 mg  220 mg Per G Tube DAILY  albuterol (PROVENTIL VENTOLIN) nebulizer solution 2.5 mg  2.5 mg Nebulization QID RT  
 metoprolol (LOPRESSOR) injection 5 mg  5 mg IntraVENous Q4H  
 furosemide (LASIX) injection 40 mg  40 mg IntraVENous Q12H  
 insulin regular (NOVOLIN R, HUMULIN R) injection 0-10 Units  0-10 Units SubCUTAneous Q6H  
 0.9% sodium chloride infusion  100 mL/hr IntraVENous CONTINUOUS  
 dexamethasone (DECADRON) 4 mg/mL injection 6 mg  6 mg IntraVENous DAILY  fentaNYL citrate (PF) injection 50 mcg  50 mcg IntraVENous Q2H PRN  
 sodium chloride (NS) flush 30 mL  30 mL InterCATHeter Q8H  
 sodium chloride (NS) flush 30 mL  30 mL InterCATHeter PRN  
 budesonide (PULMICORT) 500 mcg/2 ml nebulizer suspension  500 mcg Nebulization BID RT  
 pantoprazole (PROTONIX) 40 mg in 0.9% sodium chloride 10 mL injection  40 mg IntraVENous Q24H  
 LORazepam (ATIVAN) injection 2 mg  2 mg IntraVENous Q4H PRN  
 LORazepam (ATIVAN) tablet 0.5 mg  0.5 mg Oral Q6H PRN  
 [Held by provider] furosemide (LASIX) tablet 40 mg  40 mg Oral DAILY  [Held by provider] hydroCHLOROthiazide (HYDRODIURIL) tablet 25 mg  25 mg Oral DAILY  zolpidem (AMBIEN) tablet 10 mg  10 mg Oral QHS PRN  
 NUTRITIONAL SUPPORT ELECTROLYTE PRN ORDERS   Does Not Apply PRN  
 HYDROcodone-acetaminophen (NORCO) 5-325 mg per tablet 1 Tab  1 Tab Oral Q4H PRN  
 HYDROcodone-acetaminophen (NORCO)  mg tablet 1 Tab  1 Tab Oral Q4H PRN  
  albuterol (PROVENTIL HFA, VENTOLIN HFA, PROAIR HFA) inhaler 2 Puff  2 Puff Inhalation Q4H PRN  
 alum-mag hydroxide-simeth (MYLANTA) oral suspension 30 mL  30 mL Oral Q4H PRN  
 sodium chloride (NS) flush 5-40 mL  5-40 mL IntraVENous Q8H  
 sodium chloride (NS) flush 5-40 mL  5-40 mL IntraVENous PRN  
 acetaminophen (TYLENOL) tablet 650 mg  650 mg Oral Q6H PRN Or  
 acetaminophen (TYLENOL) suppository 650 mg  650 mg Rectal Q6H PRN  polyethylene glycol (MIRALAX) packet 17 g  17 g Oral DAILY PRN  promethazine (PHENERGAN) tablet 12.5 mg  12.5 mg Oral Q6H PRN Or  
 ondansetron (ZOFRAN) injection 4 mg  4 mg IntraVENous Q6H PRN  
 enoxaparin (LOVENOX) injection 40 mg  40 mg SubCUTAneous Q12H  
 0.9% sodium chloride infusion 250 mL  250 mL IntraVENous PRN Review of Systems Unobtainable due to patient status. Objective:  
 
Vitals:  
 11/05/20 9444 11/05/20 0902 11/05/20 7273 11/05/20 0915 BP: 108/72 100/60 104/62 (!) 102/59 Pulse: (!) 130 (!) 131 (!) 133 (!) 133 Resp: (!) 31 (!) 40 (!) 31 (!) 34 Temp: 98.7 °F (37.1 °C) 98.7 °F (37.1 °C) 98.9 °F (37.2 °C) 98.9 °F (37.2 °C) SpO2: 93% 94% 94% 93% Weight:      
Height:      
 
 
 
Intake/Output Summary (Last 24 hours) at 11/5/2020 8491 Last data filed at 11/5/2020 2765 Gross per 24 hour Intake 2680.18 ml Output 1186 ml Net 1494.18 ml Physical Exam:         
Constitutional:  intubated and mechanically ventilated. EENMT:  Sclera clear, pupils equal, oral mucosa moist 
Respiratory: few scattered rhonchi 
Cardiovascular:  Tachy RRR with no M,G,R; 
Gastrointestinal:  soft with no tenderness; positive bowel sounds present Musculoskeletal:  warm with no cyanosis, trace lower extremity edema Skin:  no jaundice or ecchymosis Neurologic: no gross neuro deficits Psychiatric:  sedated LINES:   
ETT 
PICC 
kapadia DRIPS:   
Versed Fentanyl Diprivan TPN 
 
CXR:   
 
11/5: 
 
 
 
11/4: Ventilator Settings Mode FIO2 Rate Tidal Volume Pressure PEEP Pressure control  80 % 34    16 15 cm H20 Peak airway pressure: 31.7 cm H2O Minute ventilation: 15.4 l/min ABG:  
Recent Labs 11/05/20 
7738 11/05/20 
0325 11/04/20 
0582 PHI 7.21* 7.22* 7.24* PCO2I 78.3* 77.1* 65.8*  
PO2I 74* 63* 116* HCO3I 31.3* 31.3* 28.5* LAB Recent Labs 11/05/20 
0444 11/04/20 
2324 11/04/20 
1743 11/04/20 
1216 11/02/20 
1648 GLUCPOC 150* 203* 199* 204* 130* Recent Labs 11/05/20 
9956 11/04/20 
6860 11/03/20 
3316 WBC 24.9* 19.3* 18.4* HGB 11.9 11.9 12.5 HCT 39.2 37.3 37.3 PLT 78* 104* 159 Recent Labs 11/05/20 
1500 11/05/20 
0357 11/05/20 
0034 11/04/20 
2200  11/03/20 
5011 NA  --  143 140 140   < > 140  
K 5.9* 5.6* 6.8* 6.2*   < > 4.2 CL  --  109* 109* 109*   < > 107 CO2  --  32 31 32   < > 29 GLU  --  143* 189* 228*   < > 140* BUN  --  23 20 19   < > 14 CREA  --  0.80 0.64 0.63   < > 0.55* MG  --  2.9*  --  3.0*  --  2.6* PHOS  --  3.9  --  5.0*  --  2.7 CA  --  8.4 8.3 7.8*   < > 8.0*  
 < > = values in this interval not displayed. No results for input(s): LCAD, LAC in the last 72 hours. Patient Active Problem List  
Diagnosis Code  
 HTN (hypertension) I10  
 HLD (hyperlipidemia) E78.5  Fibromyalgia M79.7  RA (rheumatoid arthritis) (HCC) M06.9  Chronic fatigue R53.82  
 H/O Clostridium difficile infection Z86.19  
 Recurrent UTI N39.0  Asthma J45.909  Depression F32.9  Menopausal state N95.1  Malaise and fatigue R53.81, R53.83  
 GERD (gastroesophageal reflux disease) K21.9  Morbid obesity (HCC) E66.01  
 GLENYS (obstructive sleep apnea) G47.33  
 Persistent disorder of initiating or maintaining sleep G47.00  
 Urgency of urination R39.15  Dyslipidemia E78.5  Essential hypertension I10  
 Palpitations R00.2  Allergic rhinitis J30.9  Eczema L30.9  Edema R60.9  Abnormal liver function tests R94.5  Essential tremor G25.0  Irritable bowel syndrome K58.9  Lateral epicondylitis M77.10  
 Osteopenia M85.80  Pain in joint involving pelvic region and thigh M25.559  Polyarthritis M13.0  Postablative ovarian failure E89.40  Primary localized osteoarthritis of pelvic region and thigh M16.10  Vitamin D deficiency E55.9  Neck pain M54.2  Sciatica M54.30  Non-radiographic axial spondyloarthritis ([facility] M46.80  
 PTSD (post-traumatic stress disorder) F43.10  
 Osteoarthritis M19.90  Obesity, morbid (more than 100 lbs over ideal weight or BMI > 40) ([facility]) E66.01  
 Kidney stones N20.0  Heart palpitations R00.2  Diabetes ([facility] E11.9  Cystitis N30.90  Chronic pain G89.29  
 ADHD (attention deficit hyperactivity disorder) F90.9  Incisional hernia K43.2  Acute hypoxemic respiratory failure due to COVID-19 ([facility]) U07.1, J96.01  
 COVID-19 U07.1  Pneumonia due to COVID-19 virus U07.1, J12.89  
 Acute respiratory distress syndrome (ARDS) due to COVID-19 virus ([facility]) U07.1, J80  
 Hyperkalemia E87.5  Tachycardia R00.0 Assessment:  (Medical Decision Making) Hospital Problems  Date Reviewed: 10/21/2020 Codes Class Noted POA Hyperkalemia ICD-10-CM: E87.5 ICD-9-CM: 276.7  11/5/2020 Unknown Repeat down to 5.9 Tachycardia ICD-10-CM: R00.0 ICD-9-CM: 785.0  11/5/2020 Unknown Restart BB Acute respiratory distress syndrome (ARDS) due to COVID-19 virus [facility]) ICD-10-CM: U07.1, J80 
ICD-9-CM: 518.82, 079.89  11/4/2020 Yes Ongoing Pneumonia due to COVID-19 virus ICD-10-CM: U07.1, J12.89 ICD-9-CM: 480.8  11/1/2020 Unknown Severe * (Principal) Acute hypoxemic respiratory failure due to COVID-19 [facility]) ICD-10-CM: U07.1, J96.01 
ICD-9-CM: 518.81, 079.89, 799.02  10/30/2020 Yes COVID-19 ICD-10-CM: U07.1 ICD-9-CM: 079.89  10/30/2020 Yes On decadron. Too late with Dx for Remdesivir Obesity, morbid (more than 100 lbs over ideal weight or BMI > 40) (HCC) ICD-10-CM: E66.01 
ICD-9-CM: 278.01  Unknown Yes Overview Signed 7/20/2020 10:50 AM by Faiza Gutierres MD  
  BMI 48, HTN, need large BP cuff. GLENYS (obstructive sleep apnea) (Chronic) ICD-10-CM: G47.33 
ICD-9-CM: 327.23  7/21/2015 Yes Now intubated HTN (hypertension) (Chronic) ICD-10-CM: I10 
ICD-9-CM: 401.9  12/14/2012 Yes Overview Signed 12/17/2012  9:09 AM by Faiza Gutierres MD  
  The patient is stable on the current medications. Tolerating well. No sides effects. Will not adjust at this time. HLD (hyperlipidemia) (Chronic) ICD-10-CM: H55.4 ICD-9-CM: 272.4  12/14/2012 Yes Overview Signed 12/17/2012  9:09 AM by Faiza Gutierres MD  
  The patient is stable on the current medications. Tolerating well. No sides effects. Will not adjust at this time. Fibromyalgia (Chronic) ICD-10-CM: M79.7 ICD-9-CM: 729.1  12/14/2012 Yes Overview Signed 12/17/2012  9:10 AM by Faiza Gutierres MD  
  The patient is stable on the current medications. Tolerating well. No sides effects. Will not adjust at this time. RA (rheumatoid arthritis) (HCC) (Chronic) ICD-10-CM: M06.9 ICD-9-CM: 714.0  12/14/2012 Yes Overview Signed 12/17/2012  9:11 AM by Faiza Gutierres MD  
  The patient is stable on the current medications. Tolerating well. No sides effects. Will not adjust at this time. Followed by Dr. Kyle Crowe. Plan:  (Medical Decision Making) --Try to prone 
--Diurese --Stop TPN due to high K. 
--Recheck ABG in 4 hrs. Trying to avoid higher Vt. 
--Decadron for total 10 days 
--IV Protonix 
--Check HIT with platelet drop --Stop Lovenox for now. --Recheck K later today. --updated her spouse by phone [phone] The patient is critically ill with respiratory failure, circulatory failure and requires high complexity decision making for assessment and support including frequent ventilator adjustment , frequent evaluation and titration of therapies , application of advanced monitoring technologies and extensive interpretation of multiple databases Cumulative time devoted to patient care services by me for day of service -38 min More than 50% of the time documented was spent in face-to-face contact with the patient and in the care of the patient on the floor/unit where the patient is located.  
 
Siobhan Langford MD

## 2020-11-05 NOTE — PROGRESS NOTES
Pt HR 130s with PVCs, dropping into 60s occasionally. EKG ordered and BMP sent. Potassium 6.2. Notified MD. Orders to stop TPN, give 250 ml bolus of NS, and start NS @ 100 ml/hr. To recheck in 1 hr.

## 2020-11-05 NOTE — PROGRESS NOTES
Chart reviewed and pt discussed in am IDR as remains CCU/Covid positive isolation. Intubated/vent. Fentanyl, versed, and propofol gtts currently. CM will continue to follow for any assist and d/c POC when stable.

## 2020-11-05 NOTE — INTERDISCIPLINARY ROUNDS
Interdisciplinary team rounds were held 11/5/2020 with the following team members:Care Management, Nursing, Pharmacy, Physician, Respiratory Therapy and Clinical Coordinator and the patient. Plan of care discussed. See clinical pathway and/or care plan for interventions and desired outcomes.

## 2020-11-05 NOTE — PROGRESS NOTES
Bedside shift change report given to Mario Hartman RN (oncoming nurse) by Marko Higgins RN (offgoing nurse). Report included the following information SBAR, Kardex, ED Summary, Intake/Output, MAR and Recent Results.

## 2020-11-05 NOTE — PROGRESS NOTES
Ventilator check complete; patient has a #7. 0 ET tube secured at the 25 at the lip. Patient is sedated. Patient is not able to follow commands. Breath sounds are diminished. Trachea is midline, Negative for subcutaneous air, and chest excursion is symmetric. Patient is also Negative for cyanosis and is Negative for pitting edema. All alarms are set and audible. Resuscitation bag is at the head of the bed. Ventilator Settings Mode FIO2 Rate Tidal Volume Pressure PEEP I:E Ratio Pressure control  80 %   36      16 15 cm H20  1:1.54 Peak airway pressure: 31.7 cm H2O Minute ventilation: 15.4 l/min [address], [place]

## 2020-11-05 NOTE — PROGRESS NOTES
Bedside shift report received from Marcos Sidhu [facility]. Pt sedated. On vent- fiO2 80%. NSR on monitor. VSS. Dual sign off on gtts

## 2020-11-05 NOTE — PROGRESS NOTES
Ventilator check complete; patient has a #7. 0 ET tube secured at the 25 at the lip. Patient is sedated. Patient is not able to follow commands. Breath sounds are diminished. Trachea is midline, Negative for subcutaneous air, and chest excursion is symmetric. Patient is also Negative for cyanosis and is Negative for pitting edema. All alarms are set and audible. Resuscitation bag is at the head of the bed. Ventilator Settings Mode FIO2 Rate Tidal Volume Pressure PEEP I:E Ratio Pressure control  80 %          15 cm H20  1:1.54 Peak airway pressure: 31.3 cm H2O Minute ventilation: 14.5 l/min Geovany Membreno RT

## 2020-11-05 NOTE — PROGRESS NOTES
Comprehensive Nutrition Assessment Type and Reason for Visit: Reassess, TPN Management for [facility] Pulmonary. Nutrition Recommendations/Plan: 1) Resume TPN this evening with 2 liters (86 ml/hr) 4.25%AA/10%DEX, no lipids - 1020 calories/day, 85 gm protein/day in ~2000 ml volume/day. Diprivan contributes another 400 calories/day at its current infusion rate. 2) Additives/liter: 20 sodium (phosphate), 4.5 calcium, 20 phosphorus (sodium), 5 insulin. 3) MVI and MTE daily. 4) Stop IVF when TPN restarts. 5) Continue trophic TF - Glucerna 1.5 @ 10 ml/hr with a 10 ml/hr water flush or 60 ml water flush every 6 hours (if not using a feeding pump). Nutrition Assessment:  Pt admitted covid +. PMH notable for HTN, RA, GLENYS on CPAP, ADHD, DM, GERD, neurogenic bladder. Estimated Daily Nutrient Needs: 
Energy (kcal): 3900-9399 kcal/d (11-14 kcal/kg listed .4 kg) Protein (g): >125 gm/d (>2.5 gm/kg IBW/day) Fluid (ml/day): 1 ml/kcal or per MD 
 
Nutrition Related Findings:  11/5: Remains intubated, sedated and NPO. TPN was stopped during the night due to hyperkalemia. Two thirds of the potassium was removed from the solution last evening but the bag did contain 20 mEq of potassium. The patient was proned today. Staff reports good TF tolerance of trophic TF so far. Will continue at this rate while she is being proned and rely on TPN to provide the bulk of her nutrition. Diprivan is contributing ~400 praveen/day at its current infusion rate. Edema: trace generalized and 1+ BLEs. Abdomen: obese, soft with hypoactive bowel sounds. Last bowel movement: 11/1. Drips: Fentanyl, Versed and Diprivan. Meds: Decadron. Labs: sodium 143, potassium 5.6, magnesium 2.0,  (on Diprivan); POC glucose (11/4) [phone],095,145 and (11/5) 150,140 - TPN was held since 2300. Current Nutrition Therapies: DIET TUBE FEEDING Open order for details.  Keep HOB elevated at least 30 degrees. Check gastric residual every 4 hours and hold TF for residual > than or = to 250 ml x 2 consecutive checks. TPN ADULT - dextrose 10% amino acid 4.25% Current Parenteral Nutrition Orders: · Type and Formula: 4.25%AA/10%DEX 2 liters · Lipids: 250ml, Daily · Duration: Continuous · Rate/Volume: held since last night · Current PN Order Provides: 1520 calories, 88 gm protein/day in 2362 ml volume/day · Goal PN Orders Provides: Anthropometric Measures: 
· Height:  5' 2\" (157.5 cm) · Current Body Wt:  105.4 kg (232 lb 5.8 oz)(11/5/2020) Nutrition Diagnosis:  
· Inadequate oral intake related to impaired respiratory function as evidenced by intubation, nutrition support-parenteral nutrition d/t unknown TF tolerance, now being proned. Nutrition Interventions:  
Food and/or Nutrient Delivery: Continue NPO, Continue tube feeding, Modify parenteral nutrition Coordination of Nutrition Care: Interdisciplinary rounds, discussed with Guillermina Bell RN. Goals: 
Meet >75% of estimated needs within 7 days. Nutrition Monitoring and Evaluation:  
Food/Nutrient Intake Outcomes: Enteral nutrition intake/tolerance, Parenteral nutrition intake/tolerance Physical Signs/Symptoms Outcomes: Biochemical data, GI status, Hemodynamic status Discharge Planning: Too soon to determine Electronically signed by Sylvester Iyer RD, LD, [address]  on 11/5/2020 at 11:54 AM 
Contact: [phone] Addendum:  
Notified by RN that MD wanted to stop TPN, not just hold it until it could be re-formulated. TF will be changed to Nepro @ 20 ml/hr with a 10 ml/hr water flush (or 60 ml every 6 hours) - 864 sylvester/day, 39 gm protein/day in 588 ml water/day. Willis Whiteside. [facility] 
[phone]

## 2020-11-05 NOTE — PROGRESS NOTES
Recheck K 6.8. Dr. Min Hernandez notified. Orders received for 1/2 Amp of dextrose, 10 units insulin regular IV, Calcium gluconate 1 g, and 1 amp of Bicarb.

## 2020-11-06 NOTE — ROUTINE PROCESS
Dr. Radha Jackson notified of critical potassium level. Orders for 1 amp of dextrose, 10 units insulin regular IV, 1 amp of Calcium and 1 amp of bicarb. To recheck in 20 minutes. Pt stacking breaths on ventilator once every 4-5 breaths. Also discussed with MD. No new orders at this time.

## 2020-11-06 NOTE — PROGRESS NOTES
Bedside shift change report given to Guillermina Bell RN (oncoming nurse) by Ricardo Beckman RN (offgoing nurse). Report included the following information SBAR, Kardex, ED Summary, Intake/Output, MAR and Recent Results.

## 2020-11-06 NOTE — PROGRESS NOTES
Ventilator check complete; patient has a #7. 0 ET tube secured at the 25 at the lip. Patient is sedated. Patient is not able to follow commands. Breath sounds are diminished. Trachea is midline, Negative for subcutaneous air, and chest excursion is symmetric. Patient is also Negative for cyanosis and is Negative for pitting edema. All alarms are set and audible. Resuscitation bag is at the head of the bed. Ventilator Settings Mode FIO2 Rate Tidal Volume Pressure PEEP I:E Ratio Pressure control  71 %   36     16  15 cm H20  1:1.54 Peak airway pressure: 31.8 cm H2O Minute ventilation: 16.9 l/min ABG: No results for input(s): PH, PCO2, PO2, HCO3 in the last 72 hours.  
 
 
[address], [place]

## 2020-11-06 NOTE — PROGRESS NOTES
Critical Care Daily Progress Note: 11/6/2020 Admission Date: 10/30/2020 The patient's chart is reviewed and the patient is discussed with the staff. Patient is [phone] y.o.  female  with past medical history of hypertension, rheumatoid arthritis, obstructive sleep apnea on CPAP and depression/anxiety presented to the ED on 10/30 for worsening of shortness of breath.  Patient reports recent diagnosis of COVID-19 infection 8 days ago.  Symptoms started as fatigue, body aches and shortness of breath.  Patient reports worsening of exertional dyspnea for last 2 days and noted desaturations yesterday in 62s.  She is using CPAP at home and tried it with improvement in saturation.  Denies fever, chills, nausea, vomiting, abdominal pain or diarrhea. She did have loss of taste and smell. 
  
In the ED patient saturated in 80s on room air and was started on oxygen via nasal cannula requiring 6 to 8 L to maintain oxygen level above 92%.  Labs shows WBC 6.1, hemoglobin 13.4, potassium 3, ferritin 348, CRP 11.7 and lactic acid 1.5.  CXR shows diffuse bilateral small airspace consolidations characteristic of pneumonia from COVID-19 Following admission she has received dexamethasone and convalescent plasma but is  Too far out from dx to get remdesivire. She has required increase fio2 since admit and today she is on 94% airvo and sats are low 90s but do drop in the 70s with any movement. Subjective:  
 
K up again last night and received calcium, glucose and insulin. K down to 5.4 this AM. Still with significant respiratory acidosis. WBC up. Renal function better. Proned yesterday. Current Facility-Administered Medications Medication Dose Route Frequency  midazolam in normal saline (VERSED) 1 mg/mL infusion  0-10 mg/hr IntraVENous TITRATE  fentaNYL in normal saline (pf) 25 mcg/mL infusion  0-200 mcg/hr IntraVENous TITRATE  propofol (DIPRIVAN) 10 mg/mL infusion  0-50 mcg/kg/min IntraVENous TITRATE  white petrolatum-mineral oiL (AKWA TEARS) 83-15 % ophthalmic ointment   Both Eyes Q4H PRN  
 venlafaxine (EFFEXOR) tablet 75 mg  75 mg Per NG tube BID  atorvastatin (LIPITOR) tablet 40 mg  40 mg Per NG tube QHS  ascorbic acid (vitamin C) (VITAMIN C) tablet 1,000 mg  1,000 mg Per NG tube BID  zinc sulfate tablet 220 mg  220 mg Per G Tube DAILY  albuterol (PROVENTIL VENTOLIN) nebulizer solution 2.5 mg  2.5 mg Nebulization QID RT  
 metoprolol (LOPRESSOR) injection 5 mg  5 mg IntraVENous Q4H  
 0.9% sodium chloride infusion  100 mL/hr IntraVENous CONTINUOUS  
 acetaminophen (TYLENOL) tablet 650 mg  650 mg Per NG tube Q6H PRN Or  
 acetaminophen (TYLENOL) suppository 650 mg  650 mg Rectal Q6H PRN  
 insulin regular (NOVOLIN R, HUMULIN R) injection 0-10 Units  0-10 Units SubCUTAneous Q6H  
 dexamethasone (DECADRON) 4 mg/mL injection 6 mg  6 mg IntraVENous DAILY  fentaNYL citrate (PF) injection 50 mcg  50 mcg IntraVENous Q2H PRN  
 sodium chloride (NS) flush 30 mL  30 mL InterCATHeter Q8H  
 sodium chloride (NS) flush 30 mL  30 mL InterCATHeter PRN  
 budesonide (PULMICORT) 500 mcg/2 ml nebulizer suspension  500 mcg Nebulization BID RT  
 pantoprazole (PROTONIX) 40 mg in 0.9% sodium chloride 10 mL injection  40 mg IntraVENous Q24H  
 LORazepam (ATIVAN) injection 2 mg  2 mg IntraVENous Q4H PRN  
 LORazepam (ATIVAN) tablet 0.5 mg  0.5 mg Oral Q6H PRN  
 [Held by provider] furosemide (LASIX) tablet 40 mg  40 mg Oral DAILY  [Held by provider] hydroCHLOROthiazide (HYDRODIURIL) tablet 25 mg  25 mg Oral DAILY  zolpidem (AMBIEN) tablet 10 mg  10 mg Oral QHS PRN  
 NUTRITIONAL SUPPORT ELECTROLYTE PRN ORDERS   Does Not Apply PRN  
 HYDROcodone-acetaminophen (NORCO) 5-325 mg per tablet 1 Tab  1 Tab Oral Q4H PRN  
 HYDROcodone-acetaminophen (NORCO)  mg tablet 1 Tab  1 Tab Oral Q4H PRN  
  albuterol (PROVENTIL HFA, VENTOLIN HFA, PROAIR HFA) inhaler 2 Puff  2 Puff Inhalation Q4H PRN  
 alum-mag hydroxide-simeth (MYLANTA) oral suspension 30 mL  30 mL Oral Q4H PRN  
 sodium chloride (NS) flush 5-40 mL  5-40 mL IntraVENous Q8H  
 sodium chloride (NS) flush 5-40 mL  5-40 mL IntraVENous PRN  polyethylene glycol (MIRALAX) packet 17 g  17 g Oral DAILY PRN  promethazine (PHENERGAN) tablet 12.5 mg  12.5 mg Oral Q6H PRN Or  
 ondansetron (ZOFRAN) injection 4 mg  4 mg IntraVENous Q6H PRN  
 enoxaparin (LOVENOX) injection 40 mg  40 mg SubCUTAneous Q12H  
 0.9% sodium chloride infusion 250 mL  250 mL IntraVENous PRN Review of Systems Unobtainable due to patient status. Objective:  
 
Vitals:  
 11/06/20 0645 11/06/20 0701 11/06/20 0730 11/06/20 2535 BP: 96/67 97/67  97/69 Pulse: (!) 125 (!) 126 (!) 127 (!) 124 Resp: (!) 35 (!) 40 30 Temp: 98.6 °F (37 °C) 98.7 °F (37.1 °C) SpO2: 98% 98% 98% Weight:      
Height:      
 
 
 
Intake/Output Summary (Last 24 hours) at 11/6/2020 5627 Last data filed at 11/6/2020 6466 Gross per 24 hour Intake 2147.12 ml Output 1737 ml Net 410.12 ml Physical Exam:         
Constitutional:  intubated and mechanically ventilated. EENMT:  Sclera clear, pupils equal, oral mucosa moist 
Respiratory: few scattered rhonchi 
Cardiovascular:  Tachy RRR with no M,G,R; 
Gastrointestinal:  soft with no tenderness; positive bowel sounds present Musculoskeletal:  warm with no cyanosis, trace lower extremity edema Skin:  no jaundice or ecchymosis Neurologic: no gross neuro deficits Psychiatric:  sedated LINES:   
ETT 
PICC 
kapadia DRIPS:   
Versed Fentanyl Diprivan TPN 
 
CXR:   
 
11/6: 
 
 
 
11/5: 
 
 
 
11/4: 
 
 
 
 
Ventilator Settings Mode FIO2 Rate Tidal Volume Pressure PEEP Pressure control  71 % 34    16 15 cm H20 Peak airway pressure: 31.8 cm H2O Minute ventilation: 16.9 l/min ABG:  
 Recent Labs 11/06/20 
0354 11/05/20 
1310 11/05/20 
1049 PHI 7.23* 7.28* 7.21* PCO2I 89.6* 70.6* 78.3*  
PO2I 113* 76 74* HCO3I 37.1* 32.9* 31.3*  
 
 
LAB Recent Labs 11/05/20 
2357 11/05/20 
1708 11/05/20 
1056 11/05/20 
0444 11/04/20 
2324 GLUCPOC 189* 211* 140* 150* 203* Recent Labs 11/06/20 
6322 11/05/20 
0357 11/04/20 
4381 WBC 28.6* 24.9* 19.3* HGB 11.8 11.9 11.9 HCT 38.6 39.2 37.3 * 78* 104* Recent Labs 11/06/20 
0343 11/05/20 
2128 11/05/20 
1829  11/05/20 
0357  11/04/20 
2200  144  --   --  143   < > 140  
K 5.4* 5.4* 6.7*   < > 5.6*   < > 6.2*  
* 110*  --   --  109*   < > 109* CO2 35* 34*  --   --  32   < > 32 * 247*  --   --  143*   < > 228* BUN 34* 36*  --   --  23   < > 19 CREA 1.03* 1.43*  --   --  0.80   < > 0.63  
MG 3.0*  --   --   --  2.9*  --  3.0*  
PHOS 4.8*  --   --   --  3.9  --  5.0*  
CA 8.3 8.1*  --   --  8.4   < > 7.8*  
 < > = values in this interval not displayed. No results for input(s): LCAD, LAC in the last 72 hours. Patient Active Problem List  
Diagnosis Code  
 HTN (hypertension) I10  
 HLD (hyperlipidemia) E78.5  Fibromyalgia M79.7  RA (rheumatoid arthritis) ([facility]) M06.9  Chronic fatigue R53.82  
 H/O Clostridium difficile infection Z86.19  
 Recurrent UTI N39.0  Asthma J45.909  Depression F32.9  Menopausal state N95.1  Malaise and fatigue R53.81, R53.83  
 GERD (gastroesophageal reflux disease) K21.9  Morbid obesity (HCC) E66.01  
 GLENYS (obstructive sleep apnea) G47.33  
 Persistent disorder of initiating or maintaining sleep G47.00  
 Urgency of urination R39.15  Dyslipidemia E78.5  Essential hypertension I10  
 Palpitations R00.2  Allergic rhinitis J30.9  Eczema L30.9  Edema R60.9  Abnormal liver function tests R94.5  Essential tremor G25.0  Irritable bowel syndrome K58.9  Lateral epicondylitis M77.10  
 Osteopenia M85.80  Pain in joint involving pelvic region and thigh M25.559  Polyarthritis M13.0  Postablative ovarian failure E89.40  Primary localized osteoarthritis of pelvic region and thigh M16.10  Vitamin D deficiency E55.9  Neck pain M54.2  Sciatica M54.30  Non-radiographic axial spondyloarthritis ([facility] [address] M46.80  
 PTSD (post-traumatic stress disorder) F43.10  
 Osteoarthritis M19.90  Obesity, morbid (more than 100 lbs over ideal weight or BMI > 40) ([facility]) E66.01  
 Kidney stones N20.0  Heart palpitations R00.2  Diabetes ([facility] [address] E11.9  Cystitis N30.90  Chronic pain G89.29  
 ADHD (attention deficit hyperactivity disorder) F90.9  Incisional hernia K43.2  Acute hypoxemic respiratory failure due to COVID-19 ([facility]) U07.1, J96.01  
 COVID-19 U07.1  Pneumonia due to COVID-19 virus U07.1, J12.89  
 Acute respiratory distress syndrome (ARDS) due to COVID-19 virus ([facility]) U07.1, J80  
 Hyperkalemia E87.5  Tachycardia R00.0 Assessment:  (Medical Decision Making) Hospital Problems  Date Reviewed: 10/21/2020 Codes Class Noted POA Hyperkalemia ICD-10-CM: E87.5 ICD-9-CM: 276.7  11/5/2020 Unknown Better at 5.4. Check CK to screen for muscle injury from propofol Tachycardia ICD-10-CM: R00.0 ICD-9-CM: 785.0  11/5/2020 Unknown Restarted BB yesterday but tachycardia persists Acute respiratory distress syndrome (ARDS) due to COVID-19 virus [facility]) ICD-10-CM: U07.1, J80 
ICD-9-CM: 518.82, 079.89  11/4/2020 Yes Ongoing. Pneumonia due to COVID-19 virus ICD-10-CM: U07.1, J12.89 ICD-9-CM: 480.8  11/1/2020 Unknown Severe. CXR looks a little better * (Principal) Acute hypoxemic respiratory failure due to COVID-19 [facility]) ICD-10-CM: U07.1, J96.01 
ICD-9-CM: 518.81, 079.89, 799.02  10/30/2020 Yes Still with high PCO2. Try to decrease PEEP to see if increased Vd/VT causing high PCO2. COVID-19 ICD-10-CM: U07.1 ICD-9-CM: 079.89  10/30/2020 Yes On decadron. Too late with Dx for Remdesivir Obesity, morbid (more than 100 lbs over ideal weight or BMI > 40) (HCC) ICD-10-CM: E66.01 
ICD-9-CM: 278.01  Unknown Yes Overview Signed 7/20/2020 10:50 AM by Luz Gill MD  
  BMI 48, HTN, need large BP cuff. GLENYS (obstructive sleep apnea) (Chronic) ICD-10-CM: G47.33 
ICD-9-CM: 327.23  7/21/2015 Yes Now intubated HTN (hypertension) (Chronic) ICD-10-CM: I10 
ICD-9-CM: 401.9  12/14/2012 Yes Overview Signed 12/17/2012  9:09 AM by Luz Gill MD  
  The patient is stable on the current medications. Tolerating well. No sides effects. Will not adjust at this time. HLD (hyperlipidemia) (Chronic) ICD-10-CM: Z03.0 ICD-9-CM: 272.4  12/14/2012 Yes Overview Signed 12/17/2012  9:09 AM by Luz Gill MD  
  The patient is stable on the current medications. Tolerating well. No sides effects. Will not adjust at this time. Fibromyalgia (Chronic) ICD-10-CM: M79.7 ICD-9-CM: 729.1  12/14/2012 Yes Overview Signed 12/17/2012  9:10 AM by Luz Gill MD  
  The patient is stable on the current medications. Tolerating well. No sides effects. Will not adjust at this time. RA (rheumatoid arthritis) (HCC) (Chronic) ICD-10-CM: M06.9 ICD-9-CM: 714.0  12/14/2012 Yes Overview Signed 12/17/2012  9:11 AM by Luz Gill MD  
  The patient is stable on the current medications. Tolerating well. No sides effects. Will not adjust at this time. Followed by Dr. Vania Escobar. Plan:  (Medical Decision Making) --Continue proning 
--Wean FIO2 as able. --Decrease PEEP to 12 and repeat ABG in an hour. --Decrease IVF 
--Decadron for total 10 days 
--IV Protonix 
--HIT negative 
--ResumeLovenox for now. --Check CK to screen for muscle injury causing high K  
 
--updated her spouse by phone [phone] The patient is critically ill with respiratory failure, circulatory failure and requires high complexity decision making for assessment and support including frequent ventilator adjustment , frequent evaluation and titration of therapies , application of advanced monitoring technologies and extensive interpretation of multiple databases Cumulative time devoted to patient care services by me for day of service -32 min More than 50% of the time documented was spent in face-to-face contact with the patient and in the care of the patient on the floor/unit where the patient is located.  
 
Karine Rhodes MD

## 2020-11-06 NOTE — INTERDISCIPLINARY ROUNDS
Interdisciplinary team rounds were held 11/6/2020 with the following team members:Nursing, Palliative Care, Pastoral Care, Pharmacy, Physician, Respiratory Therapy and Clinical Coordinator and the patient. Plan of care discussed. See clinical pathway and/or care plan for interventions and desired outcomes.

## 2020-11-06 NOTE — PROGRESS NOTES
Pt's head turned at this time, facing door. Able to pass ETT through sxn with maneuvering. Spo2 97% post head turn.

## 2020-11-06 NOTE — PROGRESS NOTES
Comprehensive Nutrition Assessment Type and Reason for Visit: Reassess, TF Management for [facility] Pulmonary. Nutrition Recommendations/Plan: 1) Advance TF as tolerated to 30 ml/hr (Nepro) with a 20 ml/hr water flush - 1296 calories/day, 68 gm protein/day in 1000 ml water/day. 2) Change Miralax to scheduled, start Pericolace. Nutrition Assessment:  Pt admitted covid +. PMH notable for HTN, RA, GLENYS on CPAP, ADHD, DM, GERD, neurogenic bladder. Estimated Daily Nutrient Needs: 
Energy (kcal): 4178-5002 kcal/d (11-14 kcal/kg listed .4 kg) Protein (g): >125 gm/d (>2.5 gm/kg IBW/day) Fluid (ml/day): 1 ml/kcal or per MD 
 
Nutrition Related Findings:  11/6: Remains ventilated, sedated and being proned. TF remains at a low rate (20 ml/hr Nepro with a 10 ml/hr water flush). Good GI tolerance is reported at this time with low gastric residuals reported. Last bowel movement: 11/1. Abdomen: soft with active bowel sounds. Edema: trace generalized, 1+-2+ all extremities. Drips: Fentanyl, Versed, Diprivan. Meds: Decadron. Labs: sodium 144, potassium 5.4, phosphorus 4.8, magnesium 3 Current Nutrition Therapies: DIET TUBE FEEDING If not infusing TF via pump, change water flush to 60 ml every 6 hours. Open order for details. Keep HOB elevated at least 30 degrees. Current Tube Feeding (TF) Orders: · Feeding Route: Orogastric · Formula: Nepro · Schedule:Continuous · Regimen: 20 ml/hr · Additives/Modulars:   
· Water Flushes: 10 ml/hr vs 60 q 6 hrs · Current TF & Flush Orders Provides: 864 calories/day, 39 gm protein/day in 548 ml water/day. Anthropometric Measures: 
· Height:  5' 2\" (157.5 cm) · Current Body Wt:  106.2 kg (234 lb 2.1 oz)(CCU bed 11/6/2020) Nutrition Diagnosis:  
· Inadequate oral intake related to impaired respiratory function as evidenced by intubation, nutrition support-enteral nutrition Nutrition Interventions:  
Food and/or Nutrient Delivery: Modify tube feeding Coordination of Nutrition Care: Interdisciplinary rounds Goals: 
Meet >75% of estimated needs within 7 days. Nutrition Monitoring and Evaluation:  
Behavioral-Environmental Outcomes:   
Food/Nutrient Intake Outcomes: Enteral nutrition intake/tolerance Physical Signs/Symptoms Outcomes: Biochemical data, GI status, Hemodynamic status Discharge Planning: Too soon to determine Electronically signed by Edelmira Anderson RD, MICHAEL, [address]  on 11/6/2020 at 4:38 PM 
Contact: [phone]

## 2020-11-06 NOTE — PROGRESS NOTES
Patient turned to prone position with RT and 4 RNs at bedside. Patient tolerated procedure well. VSS.

## 2020-11-06 NOTE — PROGRESS NOTES
Bedside shift change report given to Nilam Aleman RN (oncoming nurse) by Azael Field RN (offgoing nurse). Report included the following information SBAR, Kardex, Intake/Output and Cardiac Rhythm Sinus tach.

## 2020-11-07 NOTE — PROGRESS NOTES
Bedside shift change report given to Kayce Cintron RN (oncoming nurse) by Renee Musa RN (offgoing nurse). Report included the following information SBAR, Kardex, ED Summary, Intake/Output, MAR and Recent Results.

## 2020-11-07 NOTE — PROGRESS NOTES
Patient was unproned at [phone], respiratory and x4 RNs at bedside to help turn. Patient tolerated turn, VSS, all lines and tubes remain in place.

## 2020-11-07 NOTE — PROGRESS NOTES
Ventilator check complete; patient has a #7. 0 ET tube secured at the 25 at the lip. Patient is sedated. Patient is not able to follow commands. Breath sounds are coarse. Trachea is midline, Negative for subcutaneous air, and chest excursion is symmetric. Patient is also Negative for cyanosis and is Negative for pitting edema. All alarms are set and audible. Resuscitation bag is at the head of the bed. Ventilator Settings Mode FIO2 Rate Tidal Volume Pressure PEEP I:E Ratio PRVC  80 %    450 ml     12 cm H20  1:1.54 Peak airway pressure: 28.8 cm H2O Minute ventilation: 16.2 l/min Jessika Garcia

## 2020-11-07 NOTE — PROGRESS NOTES
Patient asynchronous with ventilator. Dr. Charly Douglas notified, and orders received to initiate paralytics.

## 2020-11-07 NOTE — PROGRESS NOTES
Bedside, Verbal and Written shift change report given to Haroldo Jaimes RN (oncoming nurse) by Phuong Marquez RN (offgoing nurse). Report included the following information SBAR, Kardex, ED Summary, Procedure Summary, Intake/Output, MAR, Recent Results and Alarm Parameters .

## 2020-11-07 NOTE — PROGRESS NOTES
Critical Care Daily Progress Note: 11/7/2020 Admission Date: 10/30/2020 The patient's chart is reviewed and the patient is discussed with the staff. Patient is [phone] y.o.  female  with past medical history of hypertension, rheumatoid arthritis, obstructive sleep apnea on CPAP and depression/anxiety presented to the ED on 10/30 for worsening of shortness of breath.  Patient reports recent diagnosis of COVID-19 infection 8 days ago.  Symptoms started as fatigue, body aches and shortness of breath.  Patient reports worsening of exertional dyspnea for last 2 days and noted desaturations yesterday in 62s.  She is using CPAP at home and tried it with improvement in saturation.  Denies fever, chills, nausea, vomiting, abdominal pain or diarrhea. She did have loss of taste and smell. 
  
In the ED patient saturated in 80s on room air and was started on oxygen via nasal cannula requiring 6 to 8 L to maintain oxygen level above 92%.  Labs shows WBC 6.1, hemoglobin 13.4, potassium 3, ferritin 348, CRP 11.7 and lactic acid 1.5.  CXR shows diffuse bilateral small airspace consolidations characteristic of pneumonia from COVID-19 Following admission she has received dexamethasone and convalescent plasma but is  Too far out from dx to get remdesivire. She has required increase fio2 since admit and today she is on 94% airvo and sats are low 90s but do drop in the 70s with any movement. Deteriorated and required intubation. Subjective:  
 
Sedated on vent. Received lokelma last night with improvement in K. Respiratory acidosis better. Current Facility-Administered Medications Medication Dose Route Frequency  polyethylene glycol (MIRALAX) packet 17 g  17 g Per NG tube DAILY  senna-docusate (PERICOLACE) 8.6-50 mg per tablet 1 Tab  1 Tab Per NG tube DAILY  midazolam in normal saline (VERSED) 1 mg/mL infusion  0-10 mg/hr IntraVENous TITRATE  fentaNYL in normal saline (pf) 25 mcg/mL infusion  0-200 mcg/hr IntraVENous TITRATE  propofol (DIPRIVAN) 10 mg/mL infusion  0-50 mcg/kg/min IntraVENous TITRATE  white petrolatum-mineral oiL (AKWA TEARS) 83-15 % ophthalmic ointment   Both Eyes Q4H PRN  
 venlafaxine (EFFEXOR) tablet 75 mg  75 mg Per NG tube BID  ascorbic acid (vitamin C) (VITAMIN C) tablet 1,000 mg  1,000 mg Per NG tube BID  zinc sulfate tablet 220 mg  220 mg Per G Tube DAILY  albuterol (PROVENTIL VENTOLIN) nebulizer solution 2.5 mg  2.5 mg Nebulization QID RT  
 metoprolol (LOPRESSOR) injection 5 mg  5 mg IntraVENous Q4H  
 0.9% sodium chloride infusion  50 mL/hr IntraVENous CONTINUOUS  
 acetaminophen (TYLENOL) tablet 650 mg  650 mg Per NG tube Q6H PRN Or  
 acetaminophen (TYLENOL) suppository 650 mg  650 mg Rectal Q6H PRN  
 insulin regular (NOVOLIN R, HUMULIN R) injection 0-10 Units  0-10 Units SubCUTAneous Q6H  
 dexamethasone (DECADRON) 4 mg/mL injection 6 mg  6 mg IntraVENous DAILY  fentaNYL citrate (PF) injection 50 mcg  50 mcg IntraVENous Q2H PRN  
 sodium chloride (NS) flush 30 mL  30 mL InterCATHeter Q8H  
 sodium chloride (NS) flush 30 mL  30 mL InterCATHeter PRN  
 budesonide (PULMICORT) 500 mcg/2 ml nebulizer suspension  500 mcg Nebulization BID RT  
 pantoprazole (PROTONIX) 40 mg in 0.9% sodium chloride 10 mL injection  40 mg IntraVENous Q24H  
 LORazepam (ATIVAN) injection 2 mg  2 mg IntraVENous Q4H PRN  
 LORazepam (ATIVAN) tablet 0.5 mg  0.5 mg Oral Q6H PRN  
 [Held by provider] furosemide (LASIX) tablet 40 mg  40 mg Oral DAILY  [Held by provider] hydroCHLOROthiazide (HYDRODIURIL) tablet 25 mg  25 mg Oral DAILY  zolpidem (AMBIEN) tablet 10 mg  10 mg Oral QHS PRN  
 NUTRITIONAL SUPPORT ELECTROLYTE PRN ORDERS   Does Not Apply PRN  
 HYDROcodone-acetaminophen (NORCO) 5-325 mg per tablet 1 Tab  1 Tab Oral Q4H PRN  
 HYDROcodone-acetaminophen (NORCO)  mg tablet 1 Tab  1 Tab Oral Q4H PRN  
 albuterol (PROVENTIL HFA, VENTOLIN HFA, PROAIR HFA) inhaler 2 Puff  2 Puff Inhalation Q4H PRN  
 alum-mag hydroxide-simeth (MYLANTA) oral suspension 30 mL  30 mL Oral Q4H PRN  
 sodium chloride (NS) flush 5-40 mL  5-40 mL IntraVENous Q8H  
 sodium chloride (NS) flush 5-40 mL  5-40 mL IntraVENous PRN  promethazine (PHENERGAN) tablet 12.5 mg  12.5 mg Oral Q6H PRN Or  
 ondansetron (ZOFRAN) injection 4 mg  4 mg IntraVENous Q6H PRN  
 enoxaparin (LOVENOX) injection 40 mg  40 mg SubCUTAneous Q12H  
 0.9% sodium chloride infusion 250 mL  250 mL IntraVENous PRN Review of Systems Unobtainable due to patient status. Objective:  
 
Vitals:  
 11/07/20 1001 11/07/20 1016 11/07/20 1031 11/07/20 1047 BP: 103/63 104/62 105/64 105/63 Pulse: (!) 101 (!) 102 (!) 103 (!) 104 Resp: (!) 36 (!) 36 (!) 36 (!) 36 Temp: 97.7 °F (36.5 °C) 97.7 °F (36.5 °C) 97.7 °F (36.5 °C) 97.7 °F (36.5 °C) SpO2: 97% 98% 98% 98% Weight:      
Height:      
 
 
 
Intake/Output Summary (Last 24 hours) at 11/7/2020 1221 Last data filed at 11/7/2020 6929 Gross per 24 hour Intake 636.57 ml Output 1100 ml Net -463.43 ml Physical Exam:         
Constitutional:  intubated and mechanically ventilated. EENMT:  Sclera clear, pupils equal, oral mucosa moist 
Respiratory: few scattered rhonchi 
Cardiovascular:  Tachy RRR with no M,G,R; 
Gastrointestinal:  soft with no tenderness; positive bowel sounds present Musculoskeletal:  warm with no cyanosis, trace lower extremity edema Skin:  no jaundice or ecchymosis Neurologic: no gross neuro deficits Psychiatric:  sedated LINES:   
ETT 
PICC 
kapadia DRIPS:   
Versed 6 Fentanyl 150 Diprivan 13 TF 
 
CXR:   
 
11/7: 
 
 
 
11/6: 
 
 
 
11/5: 
 
 
 
11/4: 
 
 
 
 
Ventilator Settings Mode FIO2 Rate Tidal Volume Pressure PEEP PRVC  80 % 34 450 ml  16 12 cm H20 Peak airway pressure: 28.8 cm H2O Minute ventilation: 16.2 l/min ABG:  
 Recent Labs 11/07/20 
0501 11/06/20 
2020 11/06/20 
1309 PHI 7.32* 7.26* 7.12* PCO2I 72.5* 81.1* 111.9*  
PO2I 99 138* 92 HCO3I 37.0* 36.6* 36.4* LAB Recent Labs 11/07/20 
0608 11/07/20 
0000 11/06/20 
1744 11/06/20 
1054 11/05/20 
2357 GLUCPOC 218* 187* 162* 155* 189* Recent Labs 11/07/20 
1290 11/06/20 
1089 11/05/20 
3451 WBC 26.1* 28.6* 24.9* HGB 11.4* 11.8 11.9 HCT 37.3 38.6 39.2 * 103* 78* Recent Labs 11/07/20 
0726 11/07/20 
0328 11/06/20 
1615  11/06/20 
0343 11/05/20 
2128  11/05/20 
0357  11/04/20 
2200 NA  --  147*  --   --  144 144  --  143   < > 140  
K 5.0 5.0 5.9*   < > 5.4* 5.4*   < > 5.6*   < > 6.2*  
CL  --  111*  --   --  109* 110*  --  109*   < > 109* CO2  --  38*  --   --  35* 34*  --  32   < > 32  
GLU  --  224*  --   --  156* 247*  --  143*   < > 228* BUN  --  38*  --   --  34* 36*  --  23   < > 19 CREA  --  0.79  --   --  1.03* 1.43*  --  0.80   < > 0.63  
MG  --   --   --   --  3.0*  --   --  2.9*  --  3.0*  
PHOS  --   --   --   --  4.8*  --   --  3.9  --  5.0*  
CA  --  8.7  --   --  8.3 8.1*  --  8.4   < > 7.8*  
 < > = values in this interval not displayed. No results for input(s): LCAD, LAC in the last 72 hours. Patient Active Problem List  
Diagnosis Code  
 HTN (hypertension) I10  
 HLD (hyperlipidemia) E78.5  Fibromyalgia M79.7  RA (rheumatoid arthritis) ([facility]) M06.9  Chronic fatigue R53.82  
 H/O Clostridium difficile infection Z86.19  
 Recurrent UTI N39.0  Asthma J45.909  Depression F32.9  Menopausal state N95.1  Malaise and fatigue R53.81, R53.83  
 GERD (gastroesophageal reflux disease) K21.9  Morbid obesity ([facility]) E66.01  
 GLENYS (obstructive sleep apnea) G47.33  
 Persistent disorder of initiating or maintaining sleep G47.00  
 Urgency of urination R39.15  Dyslipidemia E78.5  Essential hypertension I10  
 Palpitations R00.2  Allergic rhinitis J30.9  Eczema L30.9  Edema R60.9  Abnormal liver function tests R94.5  Essential tremor G25.0  Irritable bowel syndrome K58.9  Lateral epicondylitis M77.10  
 Osteopenia M85.80  Pain in joint involving pelvic region and thigh M25.559  Polyarthritis M13.0  Postablative ovarian failure E89.40  Primary localized osteoarthritis of pelvic region and thigh M16.10  Vitamin D deficiency E55.9  Neck pain M54.2  Sciatica M54.30  Non-radiographic axial spondyloarthritis ([facility] [address] M46.80  
 PTSD (post-traumatic stress disorder) F43.10  
 Osteoarthritis M19.90  Obesity, morbid (more than 100 lbs over ideal weight or BMI > 40) ([facility]) E66.01  
 Kidney stones N20.0  Heart palpitations R00.2  Diabetes ([facility] [address] E11.9  Cystitis N30.90  Chronic pain G89.29  
 ADHD (attention deficit hyperactivity disorder) F90.9  Incisional hernia K43.2  Acute hypoxemic respiratory failure due to COVID-19 (HCC) U07.1, J96.01  
 COVID-19 U07.1  Pneumonia due to COVID-19 virus U07.1, J12.89  
 Acute respiratory distress syndrome (ARDS) due to COVID-19 virus (HCC) U07.1, J80  
 Hyperkalemia E87.5  Tachycardia R00.0 Assessment:  (Medical Decision Making) Hospital Problems  Date Reviewed: 10/21/2020 Codes Class Noted POA Hyperkalemia ICD-10-CM: E87.5 ICD-9-CM: 276.7  11/5/2020 Unknown Better today after getting lokelma last night Tachycardia ICD-10-CM: R00.0 ICD-9-CM: 785.0  11/5/2020 Unknown Restarted BB and tachycardia improved Acute respiratory distress syndrome (ARDS) due to COVID-19 virus [facility]) ICD-10-CM: U07.1, J80 
ICD-9-CM: 518.82, 079.89  11/4/2020 Yes Ongoing. Oxygenation better Pneumonia due to COVID-19 virus ICD-10-CM: U07.1, J12.89 ICD-9-CM: 480.8  11/1/2020 Unknown Severe. CXR looks about the same * (Principal) Acute hypoxemic respiratory failure due to COVID-19 [facility]) ICD-10-CM: U07.1, J96.01 
ICD-9-CM: 518.81, 079.89, 799.02  10/30/2020 Yes Still with high PCO2. Try to decrease PEEP to see if increased Vd/VT causing high PCO2. COVID-19 ICD-10-CM: U07.1 ICD-9-CM: 079.89  10/30/2020 Yes On decadron. Too late with Dx for Remdesivir Obesity, morbid (more than 100 lbs over ideal weight or BMI > 40) (HCC) ICD-10-CM: E66.01 
ICD-9-CM: 278.01  Unknown Yes Overview Signed 7/20/2020 10:50 AM by Madie Burgos MD  
  BMI 48, HTN, need large BP cuff. GLENYS (obstructive sleep apnea) (Chronic) ICD-10-CM: G47.33 
ICD-9-CM: 327.23  7/21/2015 Yes Now intubated HTN (hypertension) (Chronic) ICD-10-CM: I10 
ICD-9-CM: 401.9  12/14/2012 Yes Overview Signed 12/17/2012  9:09 AM by Madie Burgos MD  
  The patient is stable on the current medications. Tolerating well. No sides effects. Will not adjust at this time. HLD (hyperlipidemia) (Chronic) ICD-10-CM: J51.5 ICD-9-CM: 272.4  12/14/2012 Yes Overview Signed 12/17/2012  9:09 AM by Madie Burgos MD  
  The patient is stable on the current medications. Tolerating well. No sides effects. Will not adjust at this time. Fibromyalgia (Chronic) ICD-10-CM: M79.7 ICD-9-CM: 729.1  12/14/2012 Yes Overview Signed 12/17/2012  9:10 AM by Madie Burgos MD  
  The patient is stable on the current medications. Tolerating well. No sides effects. Will not adjust at this time. RA (rheumatoid arthritis) (HCC) (Chronic) ICD-10-CM: M06.9 ICD-9-CM: 714.0  12/14/2012 Yes Overview Signed 12/17/2012  9:11 AM by Madie Burgos MD  
  The patient is stable on the current medications. Tolerating well. No sides effects. Will not adjust at this time. Followed by Dr. Darlynn Halsted. Plan:  (Medical Decision Making) --Continue proning when able --Wean FIO2 as able. Sat now 98%. Decreased PEEP yesterday. --Follow K 
--Decrease IVF 
--Decadron for total 10 days 
--IV Protonix 
--HIT negative 
--Resume Lovenox for now. --Continue metoprolol IV 
 
--updated her spouse by phone [phone] The patient is critically ill with respiratory failure, circulatory failure and requires high complexity decision making for assessment and support including frequent ventilator adjustment , frequent evaluation and titration of therapies , application of advanced monitoring technologies and extensive interpretation of multiple databases Cumulative time devoted to patient care services by me for day of service -32 min More than 50% of the time documented was spent in face-to-face contact with the patient and in the care of the patient on the floor/unit where the patient is located.  
 
Maria Teresa Barakat MD

## 2020-11-08 NOTE — PROGRESS NOTES
Bedside, Verbal and Written shift change report given to Bella Khan RN (oncoming nurse) by Georgette Corbett RN (offgoing nurse). Report included the following information SBAR, Kardex, ED Summary, Intake/Output, MAR and Alarm Parameters .

## 2020-11-08 NOTE — PROGRESS NOTES
Bedside and Verbal shift change report given to [address] (oncoming nurse) by Eri Estrella (offgoing nurse). Report included the following information SBAR, Kardex, Intake/Output, MAR, Recent Results, Cardiac Rhythm ST and Alarm Parameters . Fentanyl and versed verified

## 2020-11-08 NOTE — PROGRESS NOTES
Ventilator check complete; patient has a #7. 0 ET tube secured at the 25 at the lip. Patient is sedated. Patient is not able to follow commands. Breath sounds are diminished. Trachea is midline, Negative for subcutaneous air, and chest excursion is symmetric. Patient is also Negative for cyanosis and is Positive for pitting edema. All alarms are set and audible. Resuscitation bag is at the head of the bed. Ventilator Settings Mode FIO2 Rate Tidal Volume Pressure PEEP I:E Ratio PRVC  75 %    450 ml     12 cm H20  1:1.54 Peak airway pressure: 29.2 cm H2O Minute ventilation: 16.6 l/min Kalyan Anger

## 2020-11-08 NOTE — PROGRESS NOTES
Patient's potassium is now 5.6 (up from 5.3). Orders received from Dr. Gabriela Lundberg for 15 G kayexalate via NGT once.

## 2020-11-08 NOTE — PROGRESS NOTES
Bedside and Verbal shift change report given to Nhung Eduardo RN (oncoming nurse) by Nash Manzano (offgoing nurse). Report included the following information SBAR, Kardex, Intake/Output, MAR, Recent Results, Cardiac Rhythm ST and Alarm Parameters . Fentanyl and versed verified

## 2020-11-08 NOTE — PROGRESS NOTES
Critical Care Daily Progress Note: 11/8/2020 Admission Date: 10/30/2020 The patient's chart is reviewed and the patient is discussed with the staff. Patient is [phone] y.o.  female  with past medical history of hypertension, rheumatoid arthritis, obstructive sleep apnea on CPAP and depression/anxiety presented to the ED on 10/30 for worsening of shortness of breath.  Patient reports recent diagnosis of COVID-19 infection 8 days ago.  Symptoms started as fatigue, body aches and shortness of breath.  Patient reports worsening of exertional dyspnea for last 2 days and noted desaturations yesterday in 62s.  She is using CPAP at home and tried it with improvement in saturation.  Denies fever, chills, nausea, vomiting, abdominal pain or diarrhea. She did have loss of taste and smell. 
  
In the ED patient saturated in 80s on room air and was started on oxygen via nasal cannula requiring 6 to 8 L to maintain oxygen level above 92%.  Labs shows WBC 6.1, hemoglobin 13.4, potassium 3, ferritin 348, CRP 11.7 and lactic acid 1.5.  CXR shows diffuse bilateral small airspace consolidations characteristic of pneumonia from COVID-19 Following admission she has received dexamethasone and convalescent plasma but is  Too far out from dx to get remdesivire. She has required increase fio2 since admit and today she is on 94% airvo and sats are low 90s but do drop in the 70s with any movement. Deteriorated and required intubation. Subjective:  
 
Remains edated on vent. ABG with improvement in acidosis. K remains mildly elevated. Occasionally breathes over vent. Oxygenation better and getting more hypernatremic. Still no BM despite laxatives. Current Facility-Administered Medications Medication Dose Route Frequency  polyethylene glycol (MIRALAX) packet 17 g  17 g Per NG tube DAILY  senna-docusate (PERICOLACE) 8.6-50 mg per tablet 1 Tab  1 Tab Per NG tube DAILY  midazolam in normal saline (VERSED) 1 mg/mL infusion  0-10 mg/hr IntraVENous TITRATE  fentaNYL in normal saline (pf) 25 mcg/mL infusion  0-200 mcg/hr IntraVENous TITRATE  propofol (DIPRIVAN) 10 mg/mL infusion  0-50 mcg/kg/min IntraVENous TITRATE  white petrolatum-mineral oiL (AKWA TEARS) 83-15 % ophthalmic ointment   Both Eyes Q4H PRN  
 venlafaxine (EFFEXOR) tablet 75 mg  75 mg Per NG tube BID  ascorbic acid (vitamin C) (VITAMIN C) tablet 1,000 mg  1,000 mg Per NG tube BID  zinc sulfate tablet 220 mg  220 mg Per G Tube DAILY  albuterol (PROVENTIL VENTOLIN) nebulizer solution 2.5 mg  2.5 mg Nebulization QID RT  
 metoprolol (LOPRESSOR) injection 5 mg  5 mg IntraVENous Q4H  
 0.9% sodium chloride infusion  50 mL/hr IntraVENous CONTINUOUS  
 acetaminophen (TYLENOL) tablet 650 mg  650 mg Per NG tube Q6H PRN Or  
 acetaminophen (TYLENOL) suppository 650 mg  650 mg Rectal Q6H PRN  
 insulin regular (NOVOLIN R, HUMULIN R) injection 0-10 Units  0-10 Units SubCUTAneous Q6H  
 fentaNYL citrate (PF) injection 50 mcg  50 mcg IntraVENous Q2H PRN  
 sodium chloride (NS) flush 30 mL  30 mL InterCATHeter Q8H  
 sodium chloride (NS) flush 30 mL  30 mL InterCATHeter PRN  
 budesonide (PULMICORT) 500 mcg/2 ml nebulizer suspension  500 mcg Nebulization BID RT  
 pantoprazole (PROTONIX) 40 mg in 0.9% sodium chloride 10 mL injection  40 mg IntraVENous Q24H  
 LORazepam (ATIVAN) injection 2 mg  2 mg IntraVENous Q4H PRN  
 LORazepam (ATIVAN) tablet 0.5 mg  0.5 mg Oral Q6H PRN  
 [Held by provider] furosemide (LASIX) tablet 40 mg  40 mg Oral DAILY  [Held by provider] hydroCHLOROthiazide (HYDRODIURIL) tablet 25 mg  25 mg Oral DAILY  zolpidem (AMBIEN) tablet 10 mg  10 mg Oral QHS PRN  
 NUTRITIONAL SUPPORT ELECTROLYTE PRN ORDERS   Does Not Apply PRN  
 HYDROcodone-acetaminophen (NORCO) 5-325 mg per tablet 1 Tab  1 Tab Oral Q4H PRN  
  HYDROcodone-acetaminophen (NORCO)  mg tablet 1 Tab  1 Tab Oral Q4H PRN  
 albuterol (PROVENTIL HFA, VENTOLIN HFA, PROAIR HFA) inhaler 2 Puff  2 Puff Inhalation Q4H PRN  
 alum-mag hydroxide-simeth (MYLANTA) oral suspension 30 mL  30 mL Oral Q4H PRN  
 sodium chloride (NS) flush 5-40 mL  5-40 mL IntraVENous Q8H  
 sodium chloride (NS) flush 5-40 mL  5-40 mL IntraVENous PRN  promethazine (PHENERGAN) tablet 12.5 mg  12.5 mg Oral Q6H PRN Or  
 ondansetron (ZOFRAN) injection 4 mg  4 mg IntraVENous Q6H PRN  
 enoxaparin (LOVENOX) injection 40 mg  40 mg SubCUTAneous Q12H  
 0.9% sodium chloride infusion 250 mL  250 mL IntraVENous PRN Review of Systems Unobtainable due to patient status. Objective:  
 
Vitals:  
 11/08/20 1016 11/08/20 1030 11/08/20 1031 11/08/20 1046 BP: 110/60  120/80 117/77 Pulse: (!) 118 (!) 122 (!) 118 (!) 119 Resp: (!) 31 (!) 36  (!) 39 Temp: 99.3 °F (37.4 °C) 99.3 °F (37.4 °C) 99.3 °F (37.4 °C) 99.3 °F (37.4 °C) SpO2: 93% 94% 95% 94% Weight:      
Height:      
 
 
 
Intake/Output Summary (Last 24 hours) at 11/8/2020 1108 Last data filed at 11/8/2020 1027 Gross per 24 hour Intake 4361.35 ml Output 2200 ml Net 2161.35 ml Physical Exam:         
Constitutional:  intubated and mechanically ventilated. EENMT:  Sclera clear, pupils equal, oral mucosa moist 
Respiratory: few scattered rhonchi 
Cardiovascular:  Tachy RRR with no M,G,R; 
Gastrointestinal:  soft with no tenderness; positive bowel sounds present Musculoskeletal:  warm with no cyanosis, trace lower extremity edema Skin:  no jaundice or ecchymosis Neurologic: no gross neuro deficits Psychiatric:  sedated LINES:   
ETT 
PICC 
kapadia DRIPS:   
Versed 7 Fentanyl 200 Diprivan 35 
TF 
 
CXR:  None today 11/7: 
 
 
 
11/6: 
 
 
 
11/5: 
 
 
 
 
 
Ventilator Settings Mode FIO2 Rate Tidal Volume Pressure PEEP PRVC  75 % 34 450 ml  16 12 cm H20   
 
 Peak airway pressure: 29.2 cm H2O Minute ventilation: 16.6 l/min ABG:  
Recent Labs 11/08/20 
3150 11/07/20 
0501 11/06/20 2020 PHI 7.38 7.32* 7.26* PCO2I 71.3* 72.5* 81.1*  
PO2I 79 99 138* HCO3I 41.9* 37.0* 36.6*  
 
 
LAB Recent Labs 11/07/20 
2329 11/07/20 
1731 11/07/20 
1254 11/07/20 
0608 11/07/20 
0000 GLUCPOC 251* 215* 182* 218* 187* Recent Labs 11/08/20 
0400 11/07/20 
0328 11/06/20 
0343 WBC 29.8* 26.1* 28.6* HGB 11.0* 11.4* 11.8 HCT 36.1 37.3 38.6 * 121* 103* Recent Labs 11/08/20 
0400 11/07/20 
0726 11/07/20 
0328  11/06/20 
2086 *  --  147*  --  144  
K 5.3* 5.0 5.0   < > 5.4*  
*  --  111*  --  109* CO2 42*  --  38*  --  35* *  --  224*  --  156* BUN 35*  --  38*  --  34* CREA 0.62  --  0.79  --  1.03* MG  --   --   --   --  3.0*  
PHOS  --   --   --   --  4.8*  
CA 8.8  --  8.7  --  8.3  
 < > = values in this interval not displayed. No results for input(s): LCAD, LAC in the last 72 hours. Patient Active Problem List  
Diagnosis Code  
 HTN (hypertension) I10  
 HLD (hyperlipidemia) E78.5  Fibromyalgia M79.7  RA (rheumatoid arthritis) ([facility]) M06.9  Chronic fatigue R53.82  
 H/O Clostridium difficile infection Z86.19  
 Recurrent UTI N39.0  Asthma J45.909  Depression F32.9  Menopausal state N95.1  Malaise and fatigue R53.81, R53.83  
 GERD (gastroesophageal reflux disease) K21.9  Morbid obesity ([facility]) E66.01  
 GLENYS (obstructive sleep apnea) G47.33  
 Persistent disorder of initiating or maintaining sleep G47.00  
 Urgency of urination R39.15  Dyslipidemia E78.5  Essential hypertension I10  
 Palpitations R00.2  Allergic rhinitis J30.9  Eczema L30.9  Edema R60.9  Abnormal liver function tests R94.5  Essential tremor G25.0  Irritable bowel syndrome K58.9  Lateral epicondylitis M77.10  
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  
 PTSD (post-traumatic stress disorder) F43.10  
 Osteoarthritis M19.90  Obesity, morbid (more than 100 lbs over ideal weight or BMI > 40) ([facility]) E66.01  
 Kidney stones N20.0  Heart palpitations R00.2  Diabetes ([facility] [address] E11.9  Cystitis N30.90  Chronic pain G89.29  
 ADHD (attention deficit hyperactivity disorder) F90.9  Incisional hernia K43.2  Acute hypoxemic respiratory failure due to COVID-19 ([facility]) U07.1, J96.01  
 COVID-19 U07.1  Pneumonia due to COVID-19 virus U07.1, J12.89  
 Acute respiratory distress syndrome (ARDS) due to COVID-19 virus ([facility]) U07.1, J80  
 Hyperkalemia E87.5  Tachycardia R00.0 Assessment:  (Medical Decision Making) Hospital Problems  Date Reviewed: 10/21/2020 Codes Class Noted POA Hyperkalemia ICD-10-CM: E87.5 ICD-9-CM: 276.7  11/5/2020 Unknown Better today after getting lokelma 2 days ago Tachycardia ICD-10-CM: R00.0 ICD-9-CM: 785.0  11/5/2020 Unknown Restarted BB and tachycardia ongoing. Acute respiratory distress syndrome (ARDS) due to COVID-19 virus [facility]) ICD-10-CM: U07.1, J80 
ICD-9-CM: 518.82, 079.89  11/4/2020 Yes Ongoing. Oxygenation better Pneumonia due to COVID-19 virus ICD-10-CM: U07.1, J12.89 ICD-9-CM: 480.8  11/1/2020 Unknown Severe. CXR looks about the same * (Principal) Acute hypoxemic respiratory failure due to COVID-19 [facility]) ICD-10-CM: U07.1, J96.01 
ICD-9-CM: 518.81, 079.89, 799.02  10/30/2020 Yes Still with high PCO2. Getting increased propofol and goal rate TF. Like getting excessive calories which will raise pCO2. Will decrease TF for now COVID-19 ICD-10-CM: U07.1 ICD-9-CM: 079.89  10/30/2020 Yes On decadron. Too late with Dx for Remdesivir Obesity, morbid (more than 100 lbs over ideal weight or BMI > 40) ([facility]) ICD-10-CM: E66.01 
ICD-9-CM: 278.01  Unknown Yes Overview Signed 7/20/2020 10:50 AM by Sandeep Chapman MD  
  BMI 48, HTN, need large BP cuff. GLENYS (obstructive sleep apnea) (Chronic) ICD-10-CM: G47.33 
ICD-9-CM: 327.23  7/21/2015 Yes Now intubated HTN (hypertension) (Chronic) ICD-10-CM: I10 
ICD-9-CM: 401.9  12/14/2012 Yes Overview Signed 12/17/2012  9:09 AM by Sandeep Chapman MD  
  The patient is stable on the current medications. Tolerating well. No sides effects. Will not adjust at this time. HLD (hyperlipidemia) (Chronic) ICD-10-CM: D67.4 ICD-9-CM: 272.4  12/14/2012 Yes Overview Signed 12/17/2012  9:09 AM by Sandeep Chapman MD  
  The patient is stable on the current medications. Tolerating well. No sides effects. Will not adjust at this time. Fibromyalgia (Chronic) ICD-10-CM: M79.7 ICD-9-CM: 729.1  12/14/2012 Yes Overview Signed 12/17/2012  9:10 AM by Sandeep Chapman MD  
  The patient is stable on the current medications. Tolerating well. No sides effects. Will not adjust at this time. RA (rheumatoid arthritis) ([facility]CC) (Chronic) ICD-10-CM: M06.9 ICD-9-CM: 714.0  12/14/2012 Yes Overview Signed 12/17/2012  9:11 AM by Sandeep Chapman MD  
  The patient is stable on the current medications. Tolerating well. No sides effects. Will not adjust at this time. Followed by Dr. Carmen Cabrera. Plan:  (Medical Decision Making) --Continue proning when able; drop rate to 32. --Wean FIO2 as able 
--Decrease TF to 10 since propofol drip up to 35. --Change IVF to D5W 
--Try to decrease Fentanyl a little due to constipation and potential need for more lokelma.   
--Follow K 
--Decadron for total 10 days 
--IV Protonix 
--Continue metoprolol IV 
 
 --updated her spouse by phone [phone] The patient is critically ill with respiratory failure, circulatory failure and requires high complexity decision making for assessment and support including frequent ventilator adjustment , frequent evaluation and titration of therapies , application of advanced monitoring technologies and extensive interpretation of multiple databases Cumulative time devoted to patient care services by me for day of service -32 min More than 50% of the time documented was spent in face-to-face contact with the patient and in the care of the patient on the floor/unit where the patient is located.  
 
Martin Leslie MD

## 2020-11-08 NOTE — PROGRESS NOTES
Patient placed in prone position as ordered, in reverse trendelenburg. VSS. SpO2 90-92%. RT to obtain ABG in one hour

## 2020-11-09 NOTE — PROGRESS NOTES
HR remains in the 120s. Dr. Lidya Senior made aware. On pressor for low BP. CVP is 18. Pan culture ordered.

## 2020-11-09 NOTE — PROGRESS NOTES
Critical Care Daily Progress Note: 11/9/2020  Admission Date: 10/30/2020     The patient's chart is reviewed and the patient is discussed with the staff. Patient is [phone] y.o.  female  with past medical history of hypertension, rheumatoid arthritis, obstructive sleep apnea on CPAP and depression/anxiety presented to the ED on 10/30 for worsening of shortness of breath.  Patient reports recent diagnosis of COVID-19 infection 8 days ago.  Symptoms started as fatigue, body aches and shortness of breath.  Patient reports worsening of exertional dyspnea for last 2 days and noted desaturations yesterday in 62s.  She is using CPAP at home and tried it with improvement in saturation.  Denies fever, chills, nausea, vomiting, abdominal pain or diarrhea. She did have loss of taste and smell.     In the ED patient saturated in 80s on room air and was started on oxygen via nasal cannula requiring 6 to 8 L to maintain oxygen level above 92%.  Labs shows WBC 6.1, hemoglobin 13.4, potassium 3, ferritin 348, CRP 11.7 and lactic acid 1.5.  CXR shows diffuse bilateral small airspace consolidations characteristic of pneumonia from COVID-19  Following admission she has received dexamethasone and convalescent plasma but is  Too far out from dx to get remdesivire. She has required increase fio2 since admit and today she is on 94% airvo and sats are low 90s but do drop in the 70s with any movement. Deteriorated and required intubation. Subjective:     No major overnight events, sedated and intubated.     P:F 96    Current Facility-Administered Medications   Medication Dose Route Frequency    dextrose 5% infusion  75 mL/hr IntraVENous CONTINUOUS    polyethylene glycol (MIRALAX) packet 17 g  17 g Per NG tube DAILY    senna-docusate (PERICOLACE) 8.6-50 mg per tablet 1 Tab  1 Tab Per NG tube DAILY    midazolam in normal saline (VERSED) 1 mg/mL infusion  0-10 mg/hr IntraVENous TITRATE    fentaNYL in normal saline (pf) 25 mcg/mL infusion  0-200 mcg/hr IntraVENous TITRATE    propofol (DIPRIVAN) 10 mg/mL infusion  0-50 mcg/kg/min IntraVENous TITRATE    white petrolatum-mineral oiL (AKWA TEARS) 83-15 % ophthalmic ointment   Both Eyes Q4H PRN    venlafaxine (EFFEXOR) tablet 75 mg  75 mg Per NG tube BID    ascorbic acid (vitamin C) (VITAMIN C) tablet 1,000 mg  1,000 mg Per NG tube BID    zinc sulfate tablet 220 mg  220 mg Per G Tube DAILY    albuterol (PROVENTIL VENTOLIN) nebulizer solution 2.5 mg  2.5 mg Nebulization QID RT    metoprolol (LOPRESSOR) injection 5 mg  5 mg IntraVENous Q4H    acetaminophen (TYLENOL) tablet 650 mg  650 mg Per NG tube Q6H PRN    Or    acetaminophen (TYLENOL) suppository 650 mg  650 mg Rectal Q6H PRN    insulin regular (NOVOLIN R, HUMULIN R) injection 0-10 Units  0-10 Units SubCUTAneous Q6H    fentaNYL citrate (PF) injection 50 mcg  50 mcg IntraVENous Q2H PRN    sodium chloride (NS) flush 30 mL  30 mL InterCATHeter Q8H    sodium chloride (NS) flush 30 mL  30 mL InterCATHeter PRN    budesonide (PULMICORT) 500 mcg/2 ml nebulizer suspension  500 mcg Nebulization BID RT    pantoprazole (PROTONIX) 40 mg in 0.9% sodium chloride 10 mL injection  40 mg IntraVENous Q24H    LORazepam (ATIVAN) injection 2 mg  2 mg IntraVENous Q4H PRN    LORazepam (ATIVAN) tablet 0.5 mg  0.5 mg Oral Q6H PRN    [Held by provider] furosemide (LASIX) tablet 40 mg  40 mg Oral DAILY    [Held by provider] hydroCHLOROthiazide (HYDRODIURIL) tablet 25 mg  25 mg Oral DAILY    zolpidem (AMBIEN) tablet 10 mg  10 mg Oral QHS PRN    NUTRITIONAL SUPPORT ELECTROLYTE PRN ORDERS   Does Not Apply PRN    HYDROcodone-acetaminophen (NORCO) 5-325 mg per tablet 1 Tab  1 Tab Oral Q4H PRN    HYDROcodone-acetaminophen (NORCO)  mg tablet 1 Tab  1 Tab Oral Q4H PRN    albuterol (PROVENTIL HFA, VENTOLIN HFA, PROAIR HFA) inhaler 2 Puff  2 Puff Inhalation Q4H PRN    alum-mag hydroxide-simeth (MYLANTA) oral suspension 30 mL  30 mL Oral Q4H PRN    sodium chloride (NS) flush 5-40 mL  5-40 mL IntraVENous Q8H    sodium chloride (NS) flush 5-40 mL  5-40 mL IntraVENous PRN    promethazine (PHENERGAN) tablet 12.5 mg  12.5 mg Oral Q6H PRN    Or    ondansetron (ZOFRAN) injection 4 mg  4 mg IntraVENous Q6H PRN    enoxaparin (LOVENOX) injection 40 mg  40 mg SubCUTAneous Q12H    0.9% sodium chloride infusion 250 mL  250 mL IntraVENous PRN       Review of Systems    Unobtainable due to patient status. Objective:     Vitals:    11/09/20 0446 11/09/20 0501 11/09/20 0840 11/09/20 0856   BP: 114/67 116/67 125/66    Pulse: (!) 102 (!) 104 (!) 117 96   Resp: (!) 34 (!) 32  26   Temp: 99.1 °F (37.3 °C) 99.1 °F (37.3 °C)     SpO2: 94% 94%  93%   Weight:       Height:             Intake/Output Summary (Last 24 hours) at 11/9/2020 0929  Last data filed at 11/9/2020 0535  Gross per 24 hour   Intake 3510.52 ml   Output 2000 ml   Net 1510.52 ml         Physical Exam:          Constitutional:  intubated and mechanically ventilated.   EENMT:  Sclera clear, pupils equal, oral mucosa moist  Respiratory: few scattered rhonchi  Cardiovascular:  Tachy RRR with no M,G,R;  Gastrointestinal:  soft with no tenderness; positive bowel sounds present  Musculoskeletal:  warm with no cyanosis, trace lower extremity edema  Skin:  no jaundice or ecchymosis  Neurologic: no gross neuro deficits     Psychiatric:  sedated     LINES:    ETT  PICC  kapadia    DRIPS:    Versed 7  Fentanyl 200  Diprivan 35  TF    CXR:     11/09/20:      11/7:        11/6:        11/5:            Ventilator Settings  Mode FIO2 Rate Tidal Volume Pressure PEEP   PRVC  99 %(Per Dr. Andrews DE LA ROSA) 34 320 ml(Per Dr. Andrews DE LA ROSA)  16 19 cm H20(Per Dr. Andrews DE LA ROSA)      Peak airway pressure: 35.8 cm H2O   Minute ventilation: 11.4 l/min     ABG:   Recent Labs     11/09/20  0245 11/08/20  1504 11/08/20  0312   PHI 7.37 7.31* 7.38   PCO2I 78.1* 83.9* 71.3*   PO2I 77 86 79   HCO3I 45.5* 42.5* 41.9*       LAB  Recent Labs 11/09/20  0534 11/09/20  0038 11/08/20  1743 11/08/20  1204 11/07/20  2329   GLUCPOC 181* 210* 232* 178* 251*     Recent Labs     11/09/20  0425 11/08/20  0400 11/07/20  0328   WBC 36.0* 29.8* 26.1*   HGB 11.0* 11.0* 11.4*   HCT 36.1 36.1 37.3   * 136* 121*     Recent Labs     11/09/20  0425 11/08/20  1532 11/08/20  0400  11/07/20  0328   *  --  151*  --  147*   K 4.9 5.6* 5.3*   < > 5.0   *  --  113*  --  111*   CO2 43*  --  42*  --  38*   *  --  173*  --  224*   BUN 25*  --  35*  --  38*   CREA 0.52*  --  0.62  --  0.79   MG 2.5*  --   --   --   --    PHOS 4.0  --   --   --   --    CA 8.6  --  8.8  --  8.7    < > = values in this interval not displayed. No results for input(s): LCAD, LAC in the last 72 hours.       Patient Active Problem List   Diagnosis Code    HTN (hypertension) I10    HLD (hyperlipidemia) E78.5    Fibromyalgia M79.7    RA (rheumatoid arthritis) ([facility] [address] M06.9    Chronic fatigue R53.82    H/O Clostridium difficile infection Z86.19    Recurrent UTI N39.0    Asthma J45.909    Depression F32.9    Menopausal state N95.1    Malaise and fatigue R53.81, R53.83    GERD (gastroesophageal reflux disease) K21.9    Morbid obesity (HCC) E66.01    GLENYS (obstructive sleep apnea) G47.33    Persistent disorder of initiating or maintaining sleep G47.00    Urgency of urination R39.15    Dyslipidemia E78.5    Essential hypertension I10    Palpitations R00.2    Allergic rhinitis J30.9    Eczema L30.9    Edema R60.9    Abnormal liver function tests R94.5    Essential tremor G25.0    Irritable bowel syndrome K58.9    Lateral epicondylitis M77.10    Osteopenia M85.80    Pain in joint involving pelvic region and thigh M25.559    Polyarthritis M13.0    Postablative ovarian failure E89.40    Primary localized osteoarthritis of pelvic region and thigh M16.10    Vitamin D deficiency E55.9    Neck pain M54.2    Sciatica M54.30    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[address] M46.80    PTSD (post-traumatic stress disorder) F43.10    Osteoarthritis M19.90    Obesity, morbid (more than 100 lbs over ideal weight or BMI > 40) ([facility]) E66.01    Kidney stones N20.0    Heart palpitations R00.2    Diabetes ([facility]) E11.9    Cystitis N30.90    Chronic pain G89.29    ADHD (attention deficit hyperactivity disorder) F90.9    Incisional hernia K43.2    Acute hypoxemic respiratory failure due to COVID-19 ([facility]) U07.1, J96.01    COVID-19 U07.1    Pneumonia due to COVID-19 virus U07.1, J12.89    Acute respiratory distress syndrome (ARDS) due to COVID-19 virus ([facility]) U07.1, J80    Hyperkalemia E87.5    Tachycardia R00.0         Assessment:  (Medical Decision Making)         Assessment and Plan:  (Medical Decision Making)     Hospital Problems  Date Reviewed: 10/21/2020          Codes Class Noted POA    Hyperkalemia ICD-10-CM: E87.5  ICD-9-CM: 276.7  11/5/2020 Unknown    Better today after getting lokelma 2 days ago    Tachycardia ICD-10-CM: R00.0  ICD-9-CM: 785.0  11/5/2020 Unknown    Restarted BB and tachycardia ongoing. Acute respiratory distress syndrome (ARDS) due to COVID-19 virus [facility]) ICD-10-CM: U07.1, J80  ICD-9-CM: 518.82, 079.89  11/4/2020 Yes    Ongoing. Oxygenation worse and to my eye CXR worse- needs re- proning, ventilator adjusted to decrease TV to 320 from 450 and increasing peep from 12- 19 with PLp at 32 on those settings- repeat ABG is pending    Pneumonia due to COVID-19 virus ICD-10-CM: U07.1, J12.89  ICD-9-CM: 480.8  11/1/2020 Unknown    Severe. * (Principal) Acute hypoxemic respiratory failure due to COVID-19 [facility]) ICD-10-CM: U07.1, J96.01  ICD-9-CM: 518.81, 079.89, 799.02  10/30/2020 Yes    Still with high PCO2. Getting increased propofol and goal rate TF. Like getting excessive calories which will raise pCO2. Will decrease TF for now    COVID-19 ICD-10-CM: U07.1  ICD-9-CM: 079.89  10/30/2020 Yes    On decadron.  Too late with Dx for Remdesivir    Obesity, morbid (more than 100 lbs over ideal weight or BMI > 40) (HCC) ICD-10-CM: E66.01  ICD-9-CM: 278.01  Unknown Yes    Overview Signed 7/20/2020 10:50 AM by Christiano Kelly MD     BMI 48, HTN, need large BP cuff. GLENYS (obstructive sleep apnea) (Chronic) ICD-10-CM: F23.29  ICD-9-CM: 327.23  7/21/2015 Yes    Now intubated    HTN (hypertension) (Chronic) ICD-10-CM: I10  ICD-9-CM: 401.9  12/14/2012 Yes    Overview Signed 12/17/2012  9:09 AM by Christiano Kelly MD     The patient is stable on the current medications. Tolerating well. No sides effects. Will not adjust at this time. HLD (hyperlipidemia) (Chronic) ICD-10-CM: E78.5  ICD-9-CM: 272.4  12/14/2012 Yes    Overview Signed 12/17/2012  9:09 AM by Christiano Kelly MD     The patient is stable on the current medications. Tolerating well. No sides effects. Will not adjust at this time. Fibromyalgia (Chronic) ICD-10-CM: M79.7  ICD-9-CM: 729.1  12/14/2012 Yes    Overview Signed 12/17/2012  9:10 AM by Christiano Kelly MD     The patient is stable on the current medications. Tolerating well. No sides effects. Will not adjust at this time. RA (rheumatoid arthritis) (HCC) (Chronic) ICD-10-CM: M06.9  ICD-9-CM: 714.0  12/14/2012 Yes    Overview Signed 12/17/2012  9:11 AM by Christiano Kelly MD     The patient is stable on the current medications. Tolerating well. No sides effects. Will not adjust at this time. Followed by Dr. Dayana Charlton. --Continue proning when able; repeat ABG on new ventilator settings- expect more more respiratory acidosis with decrease in TV and Ve  --Wean FIO2 as able  --Change IVF to D5W  --Start relistor 8 mg sq daily. --Decadron for total 10 days  --IV Protonix  --started on esmolol with improvement in HR but severe decrease in BP-CVP is 18 indicating a vasodilation phenomenon, ? Ensuing sepsis, ? Bacterial co-infevtion.   Allergic to vanco and PCNs.    Start on Zyvox and meropenem - pan culture before ABX, obtain wbc diff from blood in lab. -- spouse  [phone]    The patient is critically ill with respiratory failure, circulatory failure and requires high complexity decision making for assessment and support including frequent ventilator adjustment , frequent evaluation and titration of therapies , application of advanced monitoring technologies and extensive interpretation of multiple databases    Cumulative time devoted to patient care services by me for day of service -35 min       More than 50% of the time documented was spent in face-to-face contact with the patient and in the care of the patient on the floor/unit where the patient is located.     Isaac Steinberg MD

## 2020-11-09 NOTE — PROGRESS NOTES
Team rounds this am. Updates and plan of care discussed with Dr. Guerline Bedolla. Per Dr. Guerline Bedolla wean pt off of versed, keep fentanyl and propofol running.

## 2020-11-09 NOTE — PROGRESS NOTES
Chart reviewed and pt discussed in am IDR. Remains CCU/Covid positive isolation. Intubated/vent. Proning per staff. Fentanyl, versed, and propofol gtts currently. CM will continue to follow for any assist and d/c POC when stable.

## 2020-11-09 NOTE — PROGRESS NOTES
Bedside, Verbal and Written shift change report given to Itz Pan RN (oncoming nurse) by Gianfranco Aparicio RN (offgoing nurse). Report included the following information SBAR, Kardex, Intake/Output, MAR, Accordion, Recent Results and Cardiac Rhythm sinus tach.

## 2020-11-09 NOTE — PROGRESS NOTES
HR remains 130. Pt remains on levo for low BP. CVP reading 18/19. Dr. Qiana Rivas made aware. No new orders at this time

## 2020-11-09 NOTE — PROGRESS NOTES
Pt proned with RT and four other staff nurses. Dr. Mike Almaguer - ESTRELLA aware of RUE colder than LUE, pulses palpable, cap refill less than 3, no new orders placed.

## 2020-11-09 NOTE — PROGRESS NOTES
Comprehensive Nutrition Assessment Type and Reason for Visit: Reassess, TF Management for [facility] Pulmonary. Nutrition Recommendations/Plan: 1) Stop Nepro, increase hourly water flush to 30 ml/hr (720 ml/day). 2) Start 3 packets [employer] TF every 6 hours with 25 ml water flush before and after protein - 480 calories, 132 gm protein in 200 ml water. 3) Start daily liquid vitamin. Nutrition Assessment:  Pt admitted covid +. PMH notable for HTN, RA, GLENYS on CPAP, ADHD, DM, GERD, neurogenic bladder. Estimated Daily Nutrient Needs: 
Energy (kcal): 9683-9728 kcal/d (11-14 kcal/kg listed .4 kg) Protein (g): >125 gm/d (>2.5 gm/kg IBW/day) Fluid (ml/day): 1 ml/kcal or per MD 
 
Nutrition Related Findings:  11/9: Remains ventilated, sedated and being proned intermittently. Edema: 2+ generalized, 2+ non-pitting all extremities. Abdomen: obese, soft with hypoactive bowel sounds. Last bowel movement: 11/8 - first reported this admission. Labs: sodium 151, potassium 4.9 (5.0 or greater for the last 5 days) and AM glucose 183 (decadron was stopped yesterday). Drips: Fentanyl, Versed, Diprivan. Current caloric intake: Diprivan - 818 calories/day, D5W @ 75 ml/hr - 306 calories/day. Current Nutrition Therapies: DIET TUBE FEEDING  Open order for details. Keep HOB elevated at least 30 degrees. Current Tube Feeding (TF) Orders: · Feeding Route: Orogastric · Formula: Nepro · Schedule:Continuous · Regimen: 10 ml/hr · Additives/Modulars:   
· Water Flushes: 20 ml/hr vs 80 ml every 4 hours · Current TF & Flush Orders Provides: 432 calories/day, 19 gm protein/day in 654 ml water/day. Anthropometric Measures: 
· Height:  5' 2\" (157.5 cm) · Current Body Wt:  106.2 kg (234 lb 2.1 oz)(CCU bed 11/6/2020) Nutrition Diagnosis:  
· Inadequate oral intake related to impaired respiratory function as evidenced by intubation, nutrition support-enteral nutrition · Inadequate protein intake related to excessive energy intake as evidenced by Diprivan contributing 818 calories and D5W contributing 306 calories daily. Nutrition Interventions:  
Food and/or Nutrient Delivery: Modify tube feeding Coordination of Nutrition Care: Interdisciplinary rounds, discussed with Mariely Ruvalcaba RN. Goals: 
Meet >75% of estimated needs within 7 days. Nutrition Monitoring and Evaluation:  
Food/Nutrient Intake Outcomes: Enteral nutrition intake/tolerance Physical Signs/Symptoms Outcomes: Biochemical data, GI status, Hemodynamic status Discharge Planning: Too soon to determine. Electronically signed by Neva Palmer RD, MICHAEL, CNSC on 11/9/2020 at 10:48 AM 
Contact: [phone]

## 2020-11-09 NOTE — PROGRESS NOTES
Ventilator check complete; patient has a #7. 0 ET tube secured at the 26 at the lip. Patient is sedated. Patient is not able to follow commands. Breath sounds are coarse and diminished. Trachea is midline, Negative for subcutaneous air, and chest excursion is symmetric. Patient is also Negative for cyanosis and is Positive for pitting edema. All alarms are set and audible. Resuscitation bag is at the head of the bed. Ventilator Settings Mode FIO2 Rate Tidal Volume Pressure PEEP I:E Ratio PRVC  99 %(Per Dr. Ananda DE LA ROSA)   32 320 ml(Per Dr. Ananda DE LA ROSA)     19 cm H20(Per Dr. Jesusita Hashimoto)  1:1.33 Peak airway pressure: 35.8 cm H2O Minute ventilation: 11.4 l/min ABG: No results for input(s): PH, PCO2, PO2, HCO3 in the last 72 hours. Doug Jenkins

## 2020-11-09 NOTE — PROGRESS NOTES
After pt was flipped back this morning, she desated to the mid 80s. Fio2 turned up to 90% then turned up to 100% eventually after pt's sats staying high 80s.

## 2020-11-09 NOTE — PROGRESS NOTES
Bedside and verbal [facility] in the [facility]

## 2020-11-09 NOTE — PROGRESS NOTES
unproned patient at [phone], tolerated turn; however, desat into [de-identified]. Respiratory at bedside. FiO2 increased from 80% to 100%, now. All other VS stable.

## 2020-11-09 NOTE — PROGRESS NOTES
Ventilator check complete; patient has a #7. 0 ET tube secured at the 23 at the teeth. Patient is sedated and paralyzed. Patient is not able to follow commands. Breath sounds are diminished. Trachea is midline, Negative for subcutaneous air, and chest excursion is symmetric. Patient is also Negative for cyanosis and is Positive for pitting edema. All alarms are set and audible. Resuscitation bag is at the head of the bed. Patient placed in Prone position at 1415 today Ventilator Settings Mode FIO2 Rate Tidal Volume PEEP I:E Ratio PRVC  100 %   32 400 ml (per Dr. Bullard) 16 cm H20  1:1.33 Peak airway pressure: 31.5 cm H2O Minute ventilation: 13.2 l/min Johnny Pineda

## 2020-11-10 NOTE — PROGRESS NOTES
Called unit to inform bedside RN Camella Ganser that PICC line dressing is due to be changed today whenever she has time to complete. Delta Silverio RN VAT

## 2020-11-10 NOTE — PROGRESS NOTES
Ventilator check complete; patient has a #7. 0 ET tube secured at the 25 at the lip. Patient is sedated. Patient is not able to follow commands. Breath sounds are coarse and diminished. Trachea is midline, Negative for subcutaneous air, and chest excursion is symmetric. Patient is also Negative for cyanosis and is Negative for pitting edema. All alarms are set and audible. Resuscitation bag is at the head of the bed. Ventilator Settings Mode FIO2 Rate Tidal Volume Pressure PEEP I:E Ratio PRVC  100 %    410 ml     16 cm H20  1:1.33 Peak airway pressure: 33.4 cm H2O Minute ventilation: 14 l/min Kitty Night, RT

## 2020-11-10 NOTE — PROGRESS NOTES
Patient progressively getting worse. Maxed out on Levophed. Notified Dr. Michelet Amaral. AM labs and ABG drawn 
 vent settings adjusted by RT Orders to give 3 amps of bicarb and start bicarb gtt Hyperkalemic, 10 units of insulin/D50/1g Ca gluconate Igor and vaso gtts added for hypotension. Dr. Michelet Amaral also notified about low UOP and WBC of 60.2

## 2020-11-10 NOTE — PROGRESS NOTES
Patient  with his family at the bedside. His family members are grieving appropriately.  provided empathy, comfort, a spiritual presence, emotional support and prayer. This was a covid patient. I did not go in the patient's room. After putting on all the proper ppe, I talked with the  and prayed with the family at the patient's door. Omar Mary MDIV

## 2020-11-10 NOTE — PROGRESS NOTES
Pt time of death [id number]. Dr. Elias Infante on unit, made aware. House Nursing Sup called as well as . Fentanyl gtt turned off. RT called to extubate pt.

## 2020-11-10 NOTE — PROGRESS NOTES
Bedside and Verbal shift change report given to KWESI Tate (oncoming nurse) by Gregorio Kincaid RN (offgoing nurse). Report included the following information SBAR, ED Summary, Intake/Output, MAR and Recent Results. Fentanyl gtt verified in the [facility] Dr. Skinny Hendrix updated  on pt condition this am. Preparing for rounds with Dr. Lito Esparza this am. Will continue to monitor pt condition.

## 2020-11-10 NOTE — PROGRESS NOTES
Patient condition continues to deteriorate. I called Mr Darling Akins to give him an update with no answer. I left him a voicemail to call us back. He did eventually call back and is understanding how sick she is. Patient continues to be full code. He was very upset that he was not updated about her condition yesterday morning.

## 2020-11-10 NOTE — CONSULTS
Renal Consult Subjective:  
 
Augusta Crespo is a 44 y.o.  female who is being seen for FREDDY. She has a PMH of hypertension, rheumatoid arthritis, obstructive sleep apnea was admitted 10/30 for worsening of shortness of breath.  previously out pt dx of  COVID-19 infection .  Symptoms started as fatigue, body aches and shortness of breath.  Prior to admission  Increasing exertional dyspnea and oxygen sat. Mora Joshi She was admitted and tx with convelencent plasma, and dexamethasone. Despite tx, she was intubated  And has continued to decline. Now in shock, maxing pressor requirement. Creatinine is up, hyperkalemic and acidemic Discussion with critical care team, considering a trial of dialysis Past Medical History:  
Diagnosis Date  ADHD (attention deficit hyperactivity disorder)  Asthma MDI  Chronic fatigue  Chronic pain   
 generalized.  Cystitis   
 hx; fermin s/p catheter  Depression  Diabetes ([address]  Fibromyalgia  GERD (gastroesophageal reflux disease) 3/11/2015  H/O Clostridium difficile infection 12/14/2012  H/O seasonal allergies 12/14/2012  Heart palpitations   
 controlled with med  HLD (hyperlipidemia) 12/14/2012  
 HTN (hypertension) 12/14/2012  Hypokalemia 12/14/2012  Kidney stones   
 hx  Obesity, morbid (more than 100 lbs over ideal weight or BMI > 40) (HCC) BMI 48, HTN, need large BP cuff.  GLENYS (obstructive sleep apnea) 7/21/2015  Osteoarthritis   
 spine, hip  Persistent disorder of initiating or maintaining sleep 7/21/2015  Postablative ovarian failure 5/28/2020  PTSD (post-traumatic stress disorder)  RA (rheumatoid arthritis) ([address]  Recurrent UTI 12/14/2012  Unspecified adverse effect of anesthesia   
 had transient blindness s/p anesthesia in 2000; surgeries since with no problem Past Surgical History:  
Procedure Laterality Date [address]  HX CHOLECYSTECTOMY  2012  HX GASTRECTOMY  05/27/2015 Gastric Sleeve  HX HEENT [place] teeth removed [id number]  HX OOPHORECTOMY    
 large mass had the ovary removed  HX PARTIAL HYSTERECTOMY  2002  
 w/ unilateral oophorectomy  HX PELVIC LAPAROSCOPY    
 HX TONSILLECTOMY  US GUIDED CORE BREAST BIOPSY Left Family History Problem Relation Age of Onset  Diabetes Mother  Heart Disease Mother  Hypertension Mother  Diabetes Father  Heart Disease Father  Heart Failure Father  Hypertension Father  Kidney Disease Sister  Breast Cancer Maternal Grandmother 77  Hypertension Maternal Grandmother  Hypertension Paternal Grandmother Social History Tobacco Use  Smoking status: Never Smoker  Smokeless tobacco: Never Used Substance Use Topics  Alcohol use: Yes Comment: Wine with meals 1-2 x per month. Current Facility-Administered Medications Medication Dose Route Frequency Provider Last Rate Last Dose  NOREPINephrine (LEVOPHED) 16,000 mcg in dextrose 5% 250 mL infusion  0.5-30 mcg/min IntraVENous TITRATE Tara Fernandez MD 28.1 mL/hr at 11/10/20 0315 30 mcg/min at 11/10/20 0315  sodium bicarbonate (8.4%) 150 mEq in dextrose 5% 1,000 mL infusion   IntraVENous CONTINUOUS Tara Fernandez  mL/hr at 11/10/20 0516    
 vasopressin (VASOSTRICT) 40 Units in 0.9% sodium chloride 40 mL infusion  0-0.03 Units/min IntraVENous TITRATE Chantel GASTELUM MD 1.8 mL/hr at 11/10/20 0515 0.03 Units/min at 11/10/20 0515  PHENYLephrine (ROBERT-SYNEPHRINE) 60 mg in 0.9% sodium chloride 250 mL infusion   mcg/min IntraVENous TITRATE Christopher Tucker MD      
 meropenem (MERREM) 500 mg in 0.9% sodium chloride (MBP/ADV) 50 mL  0.5 g IntraVENous Q12H Flako Gibbs MD      
 methylnaltrexone (RELISTOR) injection 12 mg  12 mg SubCUTAneous Q48H Flako Gibbs MD   12 mg at 11/09/20 1025  multivit-folic acid-herbal 061 (WELLESSE PLUS) oral liquid 30 mL  30 mL Per NG tube DAILY Flako Gibbs MD   30 mL at 11/09/20 1200  
 lansoprazole (PREVACID) 3 mg/mL oral suspension 30 mg  30 mg Per NG tube ACB Flako Gibbs MD      
 esmolol (BREVIBLOC) 2,500 mg/250 mL (10 mg/mL) infusion  0-200 mcg/kg/min IntraVENous TITRATE Giovanny Martinez MD   Stopped at 11/09/20 1238  atracurium (TRACRIUM) 1,000 mg in 0.9% sodium chloride 250 mL infusion  0-15 mcg/kg/min IntraVENous TITRATE Flako Gibbs MD 6.4 mL/hr at 11/09/20 1724 4 mcg/kg/min at 11/09/20 1724  
 linezolid in dextrose 5% (ZYVOX) IVPB premix in D5W 600 mg  600 mg IntraVENous Q12H Flako Gibbs MD   600 mg at 11/10/20 0425  polyethylene glycol (MIRALAX) packet 17 g  17 g Per NG tube DAILY Ike Bowling MD   17 g at 11/09/20 0840  
 senna-docusate (PERICOLACE) 8.6-50 mg per tablet 1 Tab  1 Tab Per NG tube DAILY Ike Bowling MD   1 Tab at 11/09/20 0841  
 midazolam in normal saline (VERSED) 1 mg/mL infusion  0-10 mg/hr IntraVENous TITRATE Chris Walter MD   Stopped at 11/09/20 1248  fentaNYL in normal saline (pf) 25 mcg/mL infusion  0-200 mcg/hr IntraVENous TITRATE Chris Walter MD 3 mL/hr at 11/10/20 0730 75 mcg/hr at 11/10/20 0730  propofol (DIPRIVAN) 10 mg/mL infusion  0-50 mcg/kg/min IntraVENous TITRATE Chris Walter MD 12.4 mL/hr at 11/10/20 0730 20 mcg/kg/min at 11/10/20 0730  white petrolatum-mineral oiL (AKWA TEARS) 83-15 % ophthalmic ointment   Both Eyes Q4H PRN Ike Bowling MD      
 [Held by provider] [facility]) tablet 75 mg  75 mg Per NG tube BID Flako Gibbs MD   75 mg at 11/09/20 0841  
 ascorbic acid (vitamin C) (VITAMIN C) tablet 1,000 mg  1,000 mg Per NG tube BID Flako Gibbs MD   1,000 mg at 11/09/20 1719  
 zinc sulfate tablet 220 mg  220 mg Per G Tube DAILY Flako Gibbs MD   220 mg at 11/09/20 4467  albuterol (PROVENTIL VENTOLIN) nebulizer solution 2.5 mg  2.5 mg Nebulization QID RT Alexx Obrien MD   2.5 mg at 11/10/20 0720  
 metoprolol (LOPRESSOR) injection 5 mg  5 mg IntraVENous Q4H Alexx Obrien MD   Stopped at 11/09/20 1200  acetaminophen (TYLENOL) tablet 650 mg  650 mg Per NG tube Q6H PRN Alexx Obrien MD   650 mg at 11/07/20 2352 Or  acetaminophen (TYLENOL) suppository 650 mg  650 mg Rectal Q6H PRN Alexx Obrien MD      
 insulin regular (NOVOLIN R, HUMULIN R) injection 0-10 Units  0-10 Units SubCUTAneous Q6H Raji Saravia MD   2 Units at 11/10/20 0541  
 fentaNYL citrate (PF) injection 50 mcg  50 mcg IntraVENous Q2H PRN Raji Saravia MD   50 mcg at 11/03/20 1547  
 sodium chloride (NS) flush 30 mL  30 mL InterCATHeter Q8H Raji Saravia MD   30 mL at 11/10/20 0537  
 sodium chloride (NS) flush 30 mL  30 mL InterCATHeter PRN Raji Saravia MD      
 budesonide (PULMICORT) 500 mcg/2 ml nebulizer suspension  500 mcg Nebulization BID RT Raji Saravia MD   500 mcg at 11/10/20 0720  LORazepam (ATIVAN) injection 2 mg  2 mg IntraVENous Q4H PRN Raji Saravia MD   2 mg at 11/03/20 1525  LORazepam (ATIVAN) tablet 0.5 mg  0.5 mg Oral Q6H PRN Jc Toledo MD   0.5 mg at 11/02/20 1146  [Held by provider] furosemide (LASIX) tablet 40 mg  40 mg Oral DAILY Darline Ojeda MD      
 [Held by provider] hydroCHLOROthiazide (HYDRODIURIL) tablet 25 mg  25 mg Oral DAILY Darline Ojeda MD      
 zolpidem (AMBIEN) tablet 10 mg  10 mg Oral QHS PRN Darline Ojeda MD   5 mg at 11/01/20 2208  
 NUTRITIONAL SUPPORT ELECTROLYTE PRN ORDERS   Does Not Apply PRN Darline Ojeda MD      
 HYDROcodone-acetaminophen [facility]) 5-325 mg per tablet 1 Tab  1 Tab Oral Q4H PRN Kev Curtis MD   1 Tab at 10/31/20 1304  
 HYDROcodone-acetaminophen (NORCO)  mg tablet 1 Tab  1 Tab Oral Q4H PRN Kev Curtis MD   1 Tab at 10/31/20 2026  albuterol (PROVENTIL HFA, VENTOLIN HFA, PROAIR HFA) inhaler 2 Puff  2 Puff Inhalation Q4H PRN Richelle Morrison MD   2 Puff at 11/02/20 0041  alum-mag hydroxide-simeth (MYLANTA) oral suspension 30 mL  30 mL Oral Q4H PRN Richelle Morrison MD   30 mL at 10/31/20 1140  
 sodium chloride (NS) flush 5-40 mL  5-40 mL IntraVENous Q8H Caroline Horn MD   10 mL at 11/10/20 0537  
 sodium chloride (NS) flush 5-40 mL  5-40 mL IntraVENous PRN Caroline Horn MD      
 promethazine (PHENERGAN) tablet 12.5 mg  12.5 mg Oral Q6H PRN Caroline Horn MD   12.5 mg at 10/30/20 2335 Or  
 ondansetron (ZOFRAN) injection 4 mg  4 mg IntraVENous Q6H PRN Caroline Horn MD      
 enoxaparin (LOVENOX) injection 40 mg  40 mg SubCUTAneous Q12H Caroline Horn MD   40 mg at 11/09/20 2153  
 0.9% sodium chloride infusion 250 mL  250 mL IntraVENous PRN Caroline Horn MD      
  
 
Allergies Allergen Reactions  Codeine Palpitations  Flagyl [Metronidazole] Hives  Keflex [Cephalexin] Palpitations and Rash Other reaction(s): Rash-A 
CDiff  Levaquin [Levofloxacin] Hives Other reaction(s): Hallucinations-I  
 Lyrica [Pregabalin] Swelling  Macrobid [Nitrofurantoin Monohyd/M-Cryst] Hives Other reaction(s): Hives/Swelling-A, Rash-A  
 Neurontin [Gabapentin] Swelling  Other Medication Anaphylaxis RUTUSS allergy - dizziness  Penicillins Swelling Other reaction(s): Anaphylactic shock-A  Percocet [Oxycodone-Acetaminophen] Swelling Other reaction(s): Hives/Swelling-A, Rash-A  
 Robitussin A-C [Codeine-Guaifenesin] Rash  Sulfa (Sulfonamide Antibiotics) Rash Other reaction(s): Unknown  Tetanus Toxoid Unknown (comments) Other reaction(s): Unknown  Tetanus Vaccines And Toxoid Swelling  Tussin  [Dextromethorphan Hbr] Unknown (comments)  Tussin Dm Cough Medicine Unknown (comments)  Vancomycin Other (comments) Other reaction(s): Hives/Swelling-A \" Inability to walk\" & Edema  Whey Other (comments) \"blisters in my esophagus and stomach\"  Xyzal [Levocetirizine] Other (comments) Made her mean and irritable Review of Systems: 
Review of systems not obtained due to patient factors. Objective: Intake and Output:   
11/10 0701 - 11/10 1900 In: -  
Out: 60 [Urine:60] 11/08 1901 - 11/10 0700 In: 2152 [I.V.:1395] Out: 1360 [PNYGV:1495] Physical Exam:  
General appearance: vented Head: atraumatic Eyes: conjunctivae/corneas clear. Nose: no discharge Extremities: No edema Skin:  No rashes or lesions Data Review:  
Recent Results (from the past 24 hour(s)) POC G3 Collection Time: 11/09/20 10:51 AM  
Result Value Ref Range Device: VENT    
 FIO2 (POC) 100 % pH (POC) 7.11 (LL) 7.35 - 7.45    
 pCO2 (POC) >130.0 (HH) 35 - 45 MMHG  
 pO2 (POC) 80 75 - 100 MMHG  
 HCO3 (POC) Cannot be calculated 22 - 26 MMOL/L  
 sO2 (POC) Cannot be calculated 95 - 98 % Mode Pressure regulated volume control Tidal volume 330 ml Set Rate 32 bpm  
 PEEP/CPAP (POC) 19 cmH2O Allens test (POC) NOT APPLICABLE Site LEFT BRACHIAL Specimen type (POC) ARTERIAL Performed by [facility]   
 CO2, POC Cannot be calculated MMOL/L Respiratory comment: PhysicianNotified Exhaled minute volume 10.90 L/min COLLECT TIME 1,040 GLUCOSE, POC Collection Time: 11/09/20 11:33 AM  
Result Value Ref Range Glucose (POC) 221 (H) 65 - 100 mg/dL POC G3 Collection Time: 11/09/20  3:35 PM  
Result Value Ref Range Device: VENT    
 FIO2 (POC) 100 % pH (POC) 6.99 (LL) 7.35 - 7.45    
 pCO2 (POC) >130.0 (HH) 35 - 45 MMHG  
 pO2 (POC) 76 75 - 100 MMHG  
 HCO3 (POC) Cannot be calculated 22 - 26 MMOL/L  
 sO2 (POC) Cannot be calculated 95 - 98 % Mode Pressure regulated volume control Tidal volume 320 ml Set Rate 32 bpm  
 PEEP/CPAP (POC) 19 cmH2O Allens test (POC) NOT APPLICABLE  Site DRAWN FROM ARTERIAL LINE    
 Specimen type (POC) ARTERIAL [facility]   
 CO2, POC Cannot be calculated MMOL/L Respiratory comment: NurseNotified Exhaled minute volume 10.10 L/min COLLECT TIME 1,530 CULTURE, BLOOD Collection Time: 11/09/20  4:30 PM  
 Specimen: Blood Result Value Ref Range Special Requests: NO SPECIAL REQUESTS Culture result: NO GROWTH AFTER 13 HOURS    
POC G3 Collection Time: 11/09/20  4:40 PM  
Result Value Ref Range Device: VENT    
 FIO2 (POC) 100 % pH (POC) 7.08 (LL) 7.35 - 7.45    
 pCO2 (POC) >130.0 (HH) 35 - 45 MMHG  
 pO2 (POC) 78 75 - 100 MMHG  
 HCO3 (POC) Cannot be calculated 22 - 26 MMOL/L  
 sO2 (POC) Cannot be calculated 95 - 98 % Mode Pressure regulated volume control Tidal volume 400 ml Set Rate 33 bpm  
 PEEP/CPAP (POC) 16 cmH2O Allens test (POC) NOT APPLICABLE Site DRAWN FROM ARTERIAL LINE Specimen type (POC) ARTERIAL [facility]   
 CO2, POC Cannot be calculated MMOL/L Respiratory comment: NurseNotified Exhaled minute volume 12.80 L/min COLLECT TIME 1,635 GLUCOSE, POC Collection Time: 11/09/20  5:18 PM  
Result Value Ref Range Glucose (POC) 212 (H) 65 - 100 mg/dL POC G3 Collection Time: 11/09/20  6:32 PM  
Result Value Ref Range Device: VENT    
 FIO2 (POC) 100 % pH (POC) 7.04 (LL) 7.35 - 7.45    
 pCO2 (POC) >130.0 (HH) 35 - 45 MMHG  
 pO2 (POC) 82 75 - 100 MMHG  
 HCO3 (POC) Cannot be calculated 22 - 26 MMOL/L  
 sO2 (POC) Cannot be calculated 95 - 98 % Mode Pressure regulated volume control Tidal volume 400 ml Set Rate 32 bpm  
 PEEP/CPAP (POC) 16 cmH2O Allens test (POC) NOT APPLICABLE Site DRAWN FROM ARTERIAL LINE Specimen type (POC) ARTERIAL Performed by [facility]   
 CO2, POC Cannot be calculated MMOL/L Respiratory comment: NurseNotified Exhaled minute volume 12.40 L/min COLLECT TIME 1,825 GLUCOSE, POC  
 Collection Time: 11/09/20 11:21 PM  
Result Value Ref Range Glucose (POC) 200 (H) 65 - 100 mg/dL CBC W/O DIFF Collection Time: 11/10/20  3:42 AM  
Result Value Ref Range WBC 60.2 (HH) 4.3 - 11.1 K/uL  
 RBC 3.43 (L) 4.05 - 5.2 M/uL  
 HGB 10.6 (L) 11.7 - 15.4 g/dL HCT 35.5 (L) 35.8 - 46.3 % .5 (H) 79.6 - 97.8 FL  
 MCH 30.9 26.1 - 32.9 PG  
 MCHC 29.9 (L) 31.4 - 35.0 g/dL  
 RDW 14.5 11.9 - 14.6 % PLATELET 99 (L) 019 - 450 K/uL MPV 12.5 (H) 9.4 - 12.3 FL ABSOLUTE NRBC 0.93 (H) 0.0 - 0.2 K/uL METABOLIC PANEL, BASIC Collection Time: 11/10/20  3:42 AM  
Result Value Ref Range Sodium 140 136 - 145 mmol/L Potassium 6.4 (HH) 3.5 - 5.1 mmol/L Chloride 100 98 - 107 mmol/L  
 CO2 38 (H) 21 - 32 mmol/L Anion gap 2 (L) 7 - 16 mmol/L Glucose 184 (H) 65 - 100 mg/dL BUN 44 (H) 6 - 23 MG/DL Creatinine 2.04 (H) 0.6 - 1.0 MG/DL  
 GFR est AA 33 (L) >60 ml/min/1.73m2 GFR est non-AA 27 (L) >60 ml/min/1.73m2 Calcium 7.8 (L) 8.3 - 10.4 MG/DL  
POC G3 Collection Time: 11/10/20  3:42 AM  
Result Value Ref Range Device: VENT    
 FIO2 (POC) 100 % pH (POC) 7.05 (LL) 7.35 - 7.45    
 pCO2 (POC) >130.0 (HH) 35 - 45 MMHG  
 pO2 (POC) 80 75 - 100 MMHG  
 HCO3 (POC) Cannot be calculated 22 - 26 MMOL/L  
 sO2 (POC) Cannot be calculated 95 - 98 % Mode Pressure regulated volume control Tidal volume 410 ml Set Rate 35 bpm  
 PEEP/CPAP (POC) 16 cmH2O Allens test (POC) NOT APPLICABLE Inspiratory Time 0.74 sec Site DRAWN FROM ARTERIAL LINE Specimen type (POC) ARTERIAL Performed by Geno Bullard   
 CO2, POC Cannot be calculated MMOL/L Respiratory comment: PhysicianNotified Exhaled minute volume 14.00 L/min COLLECT TIME 341 LACTIC ACID Collection Time: 11/10/20  3:44 AM  
Result Value Ref Range Lactic acid 1.7 0.4 - 2.0 MMOL/L  
POC G3 Collection Time: 11/10/20  6:05 AM  
Result Value Ref Range  Device: VENT    
 FIO2 (POC) 100 % pH (POC) 7.09 (LL) 7.35 - 7.45    
 pCO2 (POC) 128.2 (HH) 35 - 45 MMHG  
 pO2 (POC) 55 (L) 75 - 100 MMHG  
 HCO3 (POC) 39.2 (H) 22 - 26 MMOL/L  
 sO2 (POC) 72 (L) 95 - 98 % Base excess (POC) 6 mmol/L Mode Pressure regulated volume control Tidal volume 450 ml Set Rate 35 bpm  
 PEEP/CPAP (POC) 16 cmH2O Allens test (POC) NOT APPLICABLE Inspiratory Time 0.74 sec Site DRAWN FROM ARTERIAL LINE Specimen type (POC) ARTERIAL Performed by [facility]   
 CO2, POC 43 MMOL/L Respiratory comment: PhysicianNotified Exhaled minute volume 14.40 L/min COLLECT TIME 603 Assessment:  
 
Principal Problem: 
  Acute hypoxemic respiratory failure due to COVID-19 ([facility] (10/30/2020) Active Problems: 
  HTN (hypertension) (12/14/2012) Overview: The patient is stable on the current medications. Tolerating well. No  
    sides effects. Will not adjust at this time. HLD (hyperlipidemia) (12/14/2012) Overview: The patient is stable on the current medications. Tolerating well. No  
    sides effects. Will not adjust at this time. Fibromyalgia (12/14/2012) Overview: The patient is stable on the current medications. Tolerating well. No  
    sides effects. Will not adjust at this time. RA (rheumatoid arthritis) ([facility] [address] [facility] (12/14/2012) Overview: The patient is stable on the current medications. Tolerating well. No  
    sides effects. Will not adjust at this time. Followed by Dr. Odalis Nieto. GLENYS (obstructive sleep apnea) (7/21/2015) Obesity, morbid (more than 100 lbs over ideal weight or BMI > 40) ([facility]) () Overview: BMI 48, HTN, need large BP cuff. COVID-19 (10/30/2020) Pneumonia due to COVID-19 virus (11/1/2020) Acute respiratory distress syndrome (ARDS) due to COVID-19 virus ([facility] [address] [facility] (11/4/2020) Hyperkalemia (11/5/2020) Tachycardia (11/5/2020) Plan: FREDDY associated with shock and covid pneumoia. Oliguric, hyperkalemic Critically ill. Aggressive care warrants a trial of renal placement therapy. 
- Dr. Merlin Bianchi to place an HD catheter. Will arrange for SLED vs CRRT. Initially with no UF. Signed By: Eve Gan MD   
 November 10, 2020

## 2020-11-10 NOTE — PROGRESS NOTES
Pt  and two daughters at bedside. Wishes are to make pt comfort measures. Dr. Yandy Brock made aware face to face, orders for comfort measures only placed per verbal order. All gtts stopped except fentanyl per Dr. Yandy Brock. ADMIT

## 2020-11-10 NOTE — PROGRESS NOTES
Patient unproned at 0530. Hypotensive and desating into the 70-80s, respiratory at bedside Currently maxed out on vent and maxed out on 2/3 vasopressors. Dr. Josselyn Pena updated. 2 more amps of bicarb ordered Dr. Josselyn Pena call to update .

## 2020-11-10 NOTE — PROCEDURES
Using the [address] and vascular probe, the R snuff  Box artery was identified. Using sterile seldinger technique the Rsnuff box artery was cnulated under direct vision without complication. There was a good flush and arterial wave form . Catheter was sutured in place without complication, transparent dressing applied. EBL: 2 ml Complications: None Katelyn Munoz MD.

## 2020-11-10 NOTE — PROGRESS NOTES
Bedside, Verbal and Written shift change report given to Grace Virk RN (oncoming nurse) by Solis Kumar RN (offgoing nurse). Report included the following information SBAR, Kardex, ED Summary, MAR, Accordion, Recent Results, Cardiac Rhythm sinus tach and Alarm Parameters .

## 2020-11-10 NOTE — PROGRESS NOTES
Critical Care Daily Progress Note: 11/10/2020 Admission Date: 10/30/2020 The patient's chart is reviewed and the patient is discussed with the staff. Patient is [phone] y.o.  female  with past medical history of hypertension, rheumatoid arthritis, obstructive sleep apnea on CPAP and depression/anxiety presented to the ED on 10/30 for worsening of shortness of breath.  Patient reports recent diagnosis of COVID-19 infection 8 days ago.  Symptoms started as fatigue, body aches and shortness of breath.  Patient reports worsening of exertional dyspnea for last 2 days and noted desaturations yesterday in 62s.  She is using CPAP at home and tried it with improvement in saturation.  Denies fever, chills, nausea, vomiting, abdominal pain or diarrhea. She did have loss of taste and smell. 
  
In the ED patient saturated in 80s on room air and was started on oxygen via nasal cannula requiring 6 to 8 L to maintain oxygen level above 92%.  Labs shows WBC 6.1, hemoglobin 13.4, potassium 3, ferritin 348, CRP 11.7 and lactic acid 1.5.  CXR shows diffuse bilateral small airspace consolidations characteristic of pneumonia from COVID-19 Following admission she has received dexamethasone and convalescent plasma but is  Too far out from dx to get remdesivire. She has required increase fio2 since admit and today she is on 94% airvo and sats are low 90s but do drop in the 70s with any movement. Deteriorated and required intubation. Subjective:  
 
Continues to worsen clinically, now  oliguric renal failure, proning did not help much yesterday. Now on 4 pressors, hypotensive, started on Zyvox and merrem yesterday for suspected bacterial coinfection.  called to come to the bedside, prognosis is grim and she is still a full code. Current Facility-Administered Medications Medication Dose Route Frequency  NOREPINephrine (LEVOPHED) 16,000 mcg in dextrose 5% 250 mL infusion 0.5-30 mcg/min IntraVENous TITRATE  vasopressin (VASOSTRICT) 40 Units in 0.9% sodium chloride 40 mL infusion  0-0.03 Units/min IntraVENous TITRATE  PHENYLephrine (ROBERT-SYNEPHRINE) 60 mg in 0.9% sodium chloride 250 mL infusion   mcg/min IntraVENous TITRATE  meropenem (MERREM) 500 mg in 0.9% sodium chloride (MBP/ADV) 50 mL  0.5 g IntraVENous Q12H  EPINEPHrine (ADRENALIN) 16 mg in 0.9% sodium chloride 250 mL infusion  1-10 mcg/min IntraVENous TITRATE  heparin (porcine) 1,000 unit/mL injection 5,000 Units  5,000 Units Hemodialysis DIALYSIS PRN  
 sodium bicarbonate (8.4%) 150 mEq in dextrose 5% 1,000 mL infusion   IntraVENous CONTINUOUS  
 methylnaltrexone (RELISTOR) injection 12 mg  12 mg SubCUTAneous Q22S  
 multivit-folic acid-herbal 862 (WELLESSE PLUS) oral liquid 30 mL  30 mL Per NG tube DAILY  lansoprazole (PREVACID) 3 mg/mL oral suspension 30 mg  30 mg Per NG tube ACB  esmolol (BREVIBLOC) 2,500 mg/250 mL (10 mg/mL) infusion  0-200 mcg/kg/min IntraVENous TITRATE  atracurium (TRACRIUM) 1,000 mg in 0.9% sodium chloride 250 mL infusion  0-15 mcg/kg/min IntraVENous TITRATE  linezolid in dextrose 5% (ZYVOX) IVPB premix in D5W 600 mg  600 mg IntraVENous Q12H  polyethylene glycol (MIRALAX) packet 17 g  17 g Per NG tube DAILY  senna-docusate (PERICOLACE) 8.6-50 mg per tablet 1 Tab  1 Tab Per NG tube DAILY  midazolam in normal saline (VERSED) 1 mg/mL infusion  0-10 mg/hr IntraVENous TITRATE  fentaNYL in normal saline (pf) 25 mcg/mL infusion  0-200 mcg/hr IntraVENous TITRATE  propofol (DIPRIVAN) 10 mg/mL infusion  0-50 mcg/kg/min IntraVENous TITRATE  white petrolatum-mineral oiL (AKWA TEARS) 83-15 % ophthalmic ointment   Both Eyes Q4H PRN  
 [Held by provider] venlafaxine (EFFEXOR) tablet 75 mg  75 mg Per NG tube BID  ascorbic acid (vitamin C) (VITAMIN C) tablet 1,000 mg  1,000 mg Per NG tube BID  zinc sulfate tablet 220 mg  220 mg Per G Tube DAILY  albuterol (PROVENTIL VENTOLIN) nebulizer solution 2.5 mg  2.5 mg Nebulization QID RT  
 metoprolol (LOPRESSOR) injection 5 mg  5 mg IntraVENous Q4H  
 acetaminophen (TYLENOL) tablet 650 mg  650 mg Per NG tube Q6H PRN Or  
 acetaminophen (TYLENOL) suppository 650 mg  650 mg Rectal Q6H PRN  
 insulin regular (NOVOLIN R, HUMULIN R) injection 0-10 Units  0-10 Units SubCUTAneous Q6H  
 fentaNYL citrate (PF) injection 50 mcg  50 mcg IntraVENous Q2H PRN  
 sodium chloride (NS) flush 30 mL  30 mL InterCATHeter Q8H  
 sodium chloride (NS) flush 30 mL  30 mL InterCATHeter PRN  
 budesonide (PULMICORT) 500 mcg/2 ml nebulizer suspension  500 mcg Nebulization BID RT  
 LORazepam (ATIVAN) injection 2 mg  2 mg IntraVENous Q4H PRN  
 LORazepam (ATIVAN) tablet 0.5 mg  0.5 mg Oral Q6H PRN  
 [Held by provider] furosemide (LASIX) tablet 40 mg  40 mg Oral DAILY  [Held by provider] hydroCHLOROthiazide (HYDRODIURIL) tablet 25 mg  25 mg Oral DAILY  zolpidem (AMBIEN) tablet 10 mg  10 mg Oral QHS PRN  
 NUTRITIONAL SUPPORT ELECTROLYTE PRN ORDERS   Does Not Apply PRN  
 HYDROcodone-acetaminophen (NORCO) 5-325 mg per tablet 1 Tab  1 Tab Oral Q4H PRN  
 HYDROcodone-acetaminophen (NORCO)  mg tablet 1 Tab  1 Tab Oral Q4H PRN  
 albuterol (PROVENTIL HFA, VENTOLIN HFA, PROAIR HFA) inhaler 2 Puff  2 Puff Inhalation Q4H PRN  
 alum-mag hydroxide-simeth (MYLANTA) oral suspension 30 mL  30 mL Oral Q4H PRN  
 sodium chloride (NS) flush 5-40 mL  5-40 mL IntraVENous Q8H  
 sodium chloride (NS) flush 5-40 mL  5-40 mL IntraVENous PRN  promethazine (PHENERGAN) tablet 12.5 mg  12.5 mg Oral Q6H PRN Or  
 ondansetron (ZOFRAN) injection 4 mg  4 mg IntraVENous Q6H PRN  
 enoxaparin (LOVENOX) injection 40 mg  40 mg SubCUTAneous Q12H  
 0.9% sodium chloride infusion 250 mL  250 mL IntraVENous PRN Review of Systems Unobtainable due to patient status. Objective:  
 
Vitals: 11/10/20 0859 11/10/20 0900 11/10/20 0915 11/10/20 0930 BP:      
Pulse:  (!) 141 (!) 141 (!) 138 Resp:  (!) 35 (!) 35 (!) 35 Temp:  99.3 °F (37.4 °C) 99.3 °F (37.4 °C) 99.3 °F (37.4 °C) SpO2:  (!) 77% (!) 72% (!) 66% Weight: 250 lb 10.6 oz (113.7 kg) Height:      
 
 
 
Intake/Output Summary (Last 24 hours) at 11/10/2020 1038 Last data filed at 11/10/2020 9348 Gross per 24 hour Intake  Output 320 ml Net -320 ml Physical Exam:         
Constitutional:  intubated and mechanically ventilated. EENMT:  Sclera clear, pupils equal, oral mucosa moist 
Respiratory: few scattered rhonchi 
Cardiovascular:  Tachy RRR with no M,G,R; 
Gastrointestinal:  soft with no tenderness; positive bowel sounds present Musculoskeletal:  warm with no cyanosis, trace lower extremity edema Skin:  no jaundice or ecchymosis Neurologic: no gross neuro deficits Psychiatric:  sedated LINES:   
ETT 
PICC 
kapadia DRIPS:   
Versed 7 Fentanyl 200 Diprivan 35 
TF 
 
CXR:  
 
11/09/20: 
 
 
11/7: 
 
 
 
11/6: 
 
 
 
11/5: 
 
 
 
 
 
Ventilator Settings Mode FIO2 Rate Tidal Volume Pressure PEEP PRVC  100 % 34 440 ml(decreased to maintain pplats <33)  16 16 cm H20(decreased to maintain pplats <33 and sats >85) Peak airway pressure: 35.2 cm H2O Minute ventilation: 15.3 l/min ABG:  
Recent Labs 11/10/20 
9162 11/10/20 
0342 11/09/20 
1832 PHI 7.09* 7.05* 7.04* PCO2I 128.2* >130.0* >130.0*  
PO2I 55* 80 82 HCO3I 39.2* Cannot be calculated Cannot be calculated LAB Recent Labs 11/09/20 
2321 11/09/20 
1718 11/09/20 
1133 11/09/20 
0534 11/09/20 
0038 GLUCPOC 200* 212* 221* 181* 210* Recent Labs 11/10/20 
0342 11/09/20 
0425 11/08/20 
0400 WBC 60.2* 36.0* 29.8* HGB 10.6* 11.0* 11.0*  
HCT 35.5* 36.1 36.1 PLT 99* 125* 136* Recent Labs 11/10/20 
0342 11/09/20 
0425 11/08/20 
1532 11/08/20 
0400  151*  --  151* K 6.4* 4.9 5.6* 5.3*  
  109*  --  113* CO2 38* 43*  --  42* * 183*  --  173* BUN 44* 25*  --  35* CREA 2.04* 0.52*  --  0.62  
MG  --  2.5*  --   --   
PHOS  --  4.0  --   --   
CA 7.8* 8.6  --  8.8 Recent Labs 11/10/20 
0344 LAC 1.7 Patient Active Problem List  
Diagnosis Code  
 HTN (hypertension) I10  
 HLD (hyperlipidemia) E78.5  Fibromyalgia M79.7  RA (rheumatoid arthritis) ([facility]) M06.9  Chronic fatigue R53.82  
 H/O Clostridium difficile infection Z86.19  
 Recurrent UTI N39.0  Asthma J45.909  Depression F32.9  Menopausal state N95.1  Malaise and fatigue R53.81, R53.83  
 GERD (gastroesophageal reflux disease) K21.9  Morbid obesity ([facility]) E66.01  
 GLENYS (obstructive sleep apnea) G47.33  
 Persistent disorder of initiating or maintaining sleep G47.00  
 Urgency of urination R39.15  Dyslipidemia E78.5  Essential hypertension I10  
 Palpitations R00.2  Allergic rhinitis J30.9  Eczema L30.9  Edema R60.9  Abnormal liver function tests R94.5  Essential tremor G25.0  Irritable bowel syndrome K58.9  Lateral epicondylitis M77.10  
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  
 PTSD (post-traumatic stress disorder) F43.10  
 Osteoarthritis M19.90  Obesity, morbid (more than 100 lbs over ideal weight or BMI > 40) ([facility]) E66.01  
 Kidney stones N20.0  Heart palpitations R00.2  Diabetes ([address] E11.9  Cystitis N30.90  Chronic pain G89.29  
 ADHD (attention deficit hyperactivity disorder) F90.9  Incisional hernia K43.2  Acute hypoxemic respiratory failure due to COVID-19 ([facility]) U07.1, J96.01  
 COVID-19 U07.1  Pneumonia due to COVID-19 virus U07.1, J12.89  
 Acute respiratory distress syndrome (ARDS) due to COVID-19 virus (HCC) U07.1, J80  
 Hyperkalemia E87.5  Tachycardia R00.0 Assessment:  (Medical Decision Making) Assessment and Plan:  (Medical Decision Making) Hospital Problems  Date Reviewed: 10/21/2020 Codes Class Noted POA Hyperkalemia ICD-10-CM: E87.5 ICD-9-CM: 276.7  11/5/2020 Unknown Better today after getting lokelma 2 days ago Tachycardia ICD-10-CM: R00.0 ICD-9-CM: 785.0  11/5/2020 Unknown Restarted BB and tachycardia ongoing. Acute respiratory distress syndrome (ARDS) due to COVID-19 virus [facility]) ICD-10-CM: U07.1, J80 
ICD-9-CM: 518.82, 079.89  11/4/2020 Yes Ongoing. Oxygenation worse and to my eye CXR worse- needs re- proning, ventilator adjusted to decrease TV to 320 from 450 and increasing peep from 12- 19 with PLp at 32 on those settings- repeat ABG is pending Pneumonia due to COVID-19 virus ICD-10-CM: U07.1, J12.89 ICD-9-CM: 480.8  11/1/2020 Unknown Severe. * (Principal) Acute hypoxemic respiratory failure due to COVID-19 [facility]) ICD-10-CM: U07.1, J96.01 
ICD-9-CM: 518.81, 079.89, 799.02  10/30/2020 Yes Still with high PCO2. Getting increased propofol and goal rate TF. Like getting excessive calories which will raise pCO2. Will decrease TF for now COVID-19 ICD-10-CM: U07.1 ICD-9-CM: 079.89  10/30/2020 Yes On decadron. Too late with Dx for Remdesivir Obesity, morbid (more than 100 lbs over ideal weight or BMI > 40) (HCC) ICD-10-CM: E66.01 
ICD-9-CM: 278.01  Unknown Yes Overview Signed 7/20/2020 10:50 AM by Sathish Dhillon MD  
  BMI 48, HTN, need large BP cuff. GLENYS (obstructive sleep apnea) (Chronic) ICD-10-CM: G47.33 
ICD-9-CM: 327.23  7/21/2015 Yes Now intubated HTN (hypertension) (Chronic) ICD-10-CM: I10 
ICD-9-CM: 401.9  12/14/2012 Yes  Overview Signed 12/17/2012  9:09 AM by Sathish Dhillon MD  
 The patient is stable on the current medications. Tolerating well. No sides effects. Will not adjust at this time. HLD (hyperlipidemia) (Chronic) ICD-10-CM: A78.9 ICD-9-CM: 272.4  12/14/2012 Yes Overview Signed 12/17/2012  9:09 AM by Dallas Montgomery MD  
  The patient is stable on the current medications. Tolerating well. No sides effects. Will not adjust at this time. Fibromyalgia (Chronic) ICD-10-CM: M79.7 ICD-9-CM: 729.1  12/14/2012 Yes Overview Signed 12/17/2012  9:10 AM by Dallas Montgomery MD  
  The patient is stable on the current medications. Tolerating well. No sides effects. Will not adjust at this time. RA (rheumatoid arthritis) (HCC) (Chronic) ICD-10-CM: M06.9 ICD-9-CM: 714.0  12/14/2012 Yes Overview Signed 12/17/2012  9:11 AM by Dallas Montgomery MD  
  The patient is stable on the current medications. Tolerating well. No sides effects. Will not adjust at this time. Followed by Dr. Hailey Steven. --Continue proning when able; Worsening despite all efforts,  now aggreable to DNR status 
--Decadron for total 10 days 
--IV Protonix --started on esmolol with improvement in HR but severe decrease in BP-CVP is 18 indicating a vasodilation phenomenon, ? Ensuing sepsis, ? Bacterial co-infevtion. Allergic to vanco and PCNs. Started on Zyvox and meropenem - pan culture before ABX, obtain wbc diff from blood in lab. -- spouse  [phone]- updated and now at the bedside. Wishes for her to be DNR and is leaning towards comfort care. The patient is critically ill with respiratory failure, circulatory failure and requires high complexity decision making for assessment and support including frequent ventilator adjustment , frequent evaluation and titration of therapies , application of advanced monitoring technologies and extensive interpretation of multiple databases Cumulative time devoted to patient care services by me for day of service -35 min More than 50% of the time documented was spent in face-to-face contact with the patient and in the care of the patient on the floor/unit where the patient is located.  
 
Roddie Lanes, MD

## 2020-11-10 NOTE — PROGRESS NOTES
Dr. Parker Rich at bedside in preparation to place a dialysis cath. This nurse at bedside as well. Art line reading 72/54 with adequate waveform, sats are 70%; pt remains maxed on pressors, verbal order for 500cc NS bolus. Bolus running.

## 2020-11-10 NOTE — DISCHARGE SUMMARY
Death Summary Gladys Wheeler Admission date:  10/30/2020 Discharge date:   
 
Admitting Diagnosis:  Acute hypoxemic respiratory failure (HCC) [J96.01];COVID-19 [U07.1] Discharge Diagnosis:   
Problem List as of 11/10/2020 Date Reviewed: 10/21/2020 Codes Class Noted - Resolved Hyperkalemia ICD-10-CM: E87.5 ICD-9-CM: 276.7  11/5/2020 - Present Tachycardia ICD-10-CM: R00.0 ICD-9-CM: 785.0  11/5/2020 - Present Acute respiratory distress syndrome (ARDS) due to COVID-19 virus [facility]) ICD-10-CM: U07.1, J80 
ICD-9-CM: 518.82, 079.89  11/4/2020 - Present Pneumonia due to COVID-19 virus ICD-10-CM: U07.1, J12.89 ICD-9-CM: 480.8  11/1/2020 - Present * (Principal) Acute hypoxemic respiratory failure due to COVID-19 [facility]) ICD-10-CM: U07.1, J96.01 
ICD-9-CM: 518.81, 079.89, 799.02  10/30/2020 - Present COVID-19 ICD-10-CM: U07.1 ICD-9-CM: 079.89  10/30/2020 - Present Incisional hernia ICD-10-CM: U30.1 ICD-9-CM: 553.21  10/21/2020 - Present Non-radiographic axial spondyloarthritis ([facility] [address] ICD-10-CM: M46.80 ICD-9-CM: 721.90  7/20/2020 - Present PTSD (post-traumatic stress disorder) ICD-10-CM: F43.10 ICD-9-CM: 309.81  Unknown - Present Osteoarthritis ICD-10-CM: M19.90 ICD-9-CM: 715.90  Unknown - Present Overview Signed 7/20/2020 10:50 AM by Silvestre Brittle, MD  
  spine, hip Obesity, morbid (more than 100 lbs over ideal weight or BMI > 40) (HCC) ICD-10-CM: E66.01 
ICD-9-CM: 278.01  Unknown - Present Overview Signed 7/20/2020 10:50 AM by Silvestre Brittle, MD  
  BMI 48, HTN, need large BP cuff. Kidney stones ICD-10-CM: N20.0 ICD-9-CM: 592.0  Unknown - Present Overview Signed 7/20/2020 10:50 AM by Silvestre Brittle, MD  
  hx Heart palpitations ICD-10-CM: R00.2 ICD-9-CM: 785.1  Unknown - Present Overview Signed 7/20/2020 10:51 AM by Silvestre Brittle, MD  
  controlled with med Diabetes ([facility] [address] ICD-10-CM: E11.9 ICD-9-CM: 250.00  Unknown - Present Cystitis ICD-10-CM: N30.90 ICD-9-CM: 595.9  Unknown - Present Overview Signed 7/20/2020 10:51 AM by Eliu Echeverria MD  
  hx; fermin s/p catheter Chronic pain ICD-10-CM: G89.29 ICD-9-CM: 338.29  Unknown - Present Overview Signed 7/20/2020 10:51 AM by Eliu Echeverria MD  
  generalized. ADHD (attention deficit hyperactivity disorder) ICD-10-CM: F90.9 ICD-9-CM: 314.01  Unknown - Present Eczema ICD-10-CM: L30.9 ICD-9-CM: 692.9  5/28/2020 - Present Edema ICD-10-CM: R60.9 ICD-9-CM: 782.3  5/28/2020 - Present Abnormal liver function tests ICD-10-CM: R94.5 ICD-9-CM: 790.6  5/28/2020 - Present Essential tremor ICD-10-CM: G25.0 ICD-9-CM: 333.1  5/28/2020 - Present Irritable bowel syndrome ICD-10-CM: K58.9 ICD-9-CM: 564.1  5/28/2020 - Present Lateral epicondylitis ICD-10-CM: M77.10 ICD-9-CM: 726.32  5/28/2020 - Present Osteopenia ICD-10-CM: M85.80 ICD-9-CM: 733.90  5/28/2020 - Present Pain in joint involving pelvic region and thigh ICD-10-CM: M25.559 ICD-9-CM: 719.45  5/28/2020 - Present Polyarthritis ICD-10-CM: M13.0 ICD-9-CM: 716.50  5/28/2020 - Present Postablative ovarian failure ICD-10-CM: E89.40 ICD-9-CM: 256.2  5/28/2020 - Present Primary localized osteoarthritis of pelvic region and thigh ICD-10-CM: M16.10 ICD-9-CM: 715.15  5/28/2020 - Present Vitamin D deficiency ICD-10-CM: E55.9 ICD-9-CM: 268.9  5/28/2020 - Present Neck pain ICD-10-CM: M54.2 ICD-9-CM: 723.1  5/28/2020 - Present Sciatica ICD-10-CM: M54.30 ICD-9-CM: 724.3  5/28/2020 - Present Allergic rhinitis ICD-10-CM: J30.9 ICD-9-CM: 477.9  8/31/2016 - Present Dyslipidemia ICD-10-CM: E78.5 ICD-9-CM: 272.4  3/11/2016 - Present Essential hypertension ICD-10-CM: I10 
ICD-9-CM: 401.9  3/11/2016 - Present Palpitations ICD-10-CM: R00.2 ICD-9-CM: 785.1  3/11/2016 - Present Urgency of urination ICD-10-CM: R39.15 ICD-9-CM: 788.63  9/17/2015 - Present GLENYS (obstructive sleep apnea) (Chronic) ICD-10-CM: L11.16 
ICD-9-CM: 327.23  7/21/2015 - Present Persistent disorder of initiating or maintaining sleep ICD-10-CM: G47.00 ICD-9-CM: 307.42  7/21/2015 - Present Morbid obesity ([address] ICD-10-CM: E66.01 
ICD-9-CM: 278.01  5/27/2015 - Present GERD (gastroesophageal reflux disease) ICD-10-CM: K21.9 ICD-9-CM: 530.81  3/11/2015 - Present Malaise and fatigue ICD-10-CM: R53.81, R53.83 ICD-9-CM: 780.79  10/3/2013 - Present Depression ICD-10-CM: F32.9 ICD-9-CM: 235  12/17/2012 - Present Overview Addendum 7/20/2020 10:53 AM by Art Pedraza MD  
  The patient is stable on the current medications. Tolerating well. No sides effects. Will not adjust at this time. Followed by Dr. Jeaneth Mills. Delwyn Scarce Menopausal state ICD-10-CM: N95.1 ICD-9-CM: 627.2  12/17/2012 - Present Overview Signed 12/17/2012  9:18 AM by Art Pedraza MD  
  Followed by Dr. Yu Hill. 
  
  
   
 HTN (hypertension) (Chronic) ICD-10-CM: I10 
ICD-9-CM: 401.9  12/14/2012 - Present Overview Signed 12/17/2012  9:09 AM by Art Pedraza MD  
  The patient is stable on the current medications. Tolerating well. No sides effects. Will not adjust at this time. HLD (hyperlipidemia) (Chronic) ICD-10-CM: L39.9 ICD-9-CM: 272.4  12/14/2012 - Present Overview Signed 12/17/2012  9:09 AM by Art Pedraza MD  
  The patient is stable on the current medications. Tolerating well. No sides effects. Will not adjust at this time. Fibromyalgia (Chronic) ICD-10-CM: M79.7 ICD-9-CM: 729.1  12/14/2012 - Present Overview Signed 12/17/2012  9:10 AM by Art Pedraza MD  
  The patient is stable on the current medications. Tolerating well.   No sides effects. Will not adjust at this time. RA (rheumatoid arthritis) (HCC) (Chronic) ICD-10-CM: M06.9 ICD-9-CM: 714.0  12/14/2012 - Present Overview Signed 12/17/2012  9:11 AM by Temo Gonzalez MD  
  The patient is stable on the current medications. Tolerating well. No sides effects. Will not adjust at this time. Followed by Dr. Minal Chapman. Chronic fatigue ICD-10-CM: R53.82 
ICD-9-CM: 780.79  12/14/2012 - Present Overview Signed 12/17/2012  9:10 AM by Temo Gonzalez MD  
  The patient is stable on the current medications. Tolerating well. No sides effects. Will not adjust at this time. H/O Clostridium difficile infection ICD-10-CM: Z86.19 ICD-9-CM: V12.09  12/14/2012 - Present Recurrent UTI ICD-10-CM: N39.0 ICD-9-CM: 599.0  12/14/2012 - Present Asthma ICD-10-CM: G19.349 ICD-9-CM: 493.90  12/14/2012 - Present Overview Signed 12/17/2012  9:12 AM by Temo Gonzalez MD  
  The patient is stable on the current medications. Tolerating well. No sides effects. Will not adjust at this time. Sometimees will go into a store year this time of yand some of the fragrances can trigger a flare. RESOLVED: BMI 40.0-44.9, adult [facility]) ICD-10-CM: Z68.41 
ICD-9-CM: V85.41  3/11/2015 - 7/21/2015 RESOLVED: Hypokalemia ICD-10-CM: E87.6 ICD-9-CM: 276.8  12/14/2012 - 7/21/2015 RESOLVED: H/O seasonal allergies ICD-10-CM: Z88.9 ICD-9-CM: V15.09  12/14/2012 - 4/9/2013 Overview Signed 12/17/2012  9:12 AM by Temo Gonzalez MD  
  The patient is stable on the current medications. Tolerating well. No sides effects. Will not adjust at this time. Consultants: 
Nephrology Studies/Procedures: 
 
Intubation PICC line Arterial line Hospital course: 
Patient is a 52 y.o.  female  with past medical history of hypertension, rheumatoid arthritis, obstructive sleep apnea on CPAP and depression/anxiety presented to the ED on 10/30 for worsening of shortness of breath.  Patient reported recent diagnosis of COVID-19 infection 8 PTA.  Symptoms started as fatigue, body aches and shortness of breath.  Patient reports worsening of exertional dyspnea for last 2 days and noted desaturations to the 60s. She used CPAP at home and tried it with improvement in saturation. Denied fever, chills, nausea, vomiting, abdominal pain or diarrhea. She did have loss of taste and smell. In the ED patient saturated in the 80s on room air and was started on oxygen via nasal cannula requiring 6 to 8 L to maintain oxygen level above 92%. CXR showed diffuse bilateral small airspace consolidations characteristic of pneumonia from COVID-19. Following admission she has received dexamethasone and convalescent plasma but was too far out from dx to get remdesivir. She required increase FiO2 since admit and ultimately required intubation on 11/3/20. Her condition unfortunately continued to worsen with progressive respiratory failure, sepsis and ultimately acute oliguric renal failure and progressive, pressor unresponsive septic shock and hypotension. Given grim prognosis and progressive deterioration,  and daughters decided on comfort measures and she was made DNR. All medications were stopped except for opiates and sedatives, paralysis was discontinued. At 12:39 PM patient  peacefully with her family at her bedside. Final: 
--Pronounced dead at 12:39 PM 
. --Total discharge greater than 30 minutes in duration.  
 
Austin Hooper MD

## 2020-11-10 NOTE — PROGRESS NOTES
Patient's  called for update. Voice his frustration and irritation about how he never received a call from the physician today. After updating patient's  on patient's condition, reassured that this RN will pass on in report for the physician to call  for an update and will pass it on to charge RN to follow up.

## 2020-11-10 NOTE — PROCEDURES
Procedure:  
 
US GUIDED 15 cm trialysis temporary dialysis catheter placement. Indication:  
 
Acute oliguric renal failure Summary: 
 
Informed consent was obtained from the patient's family. Using the [facility] ultrasound with the 5-10 MHz vascular probe transducer and sterile seldinger technique, the RIJV vein was identified and under direct real time visualization was cannulated. The dialysis catheter kit guide wire was then inserted and its position within the RIJV and location within the vein  verified in short and long axis views on vascular probe US. The dialysis catheter was then inserted over the guide wire without complication after adequate dilation of the insertion site. There was no significant bleeding during the procedure and good flush and drawback was noted. The catheter was then affixed with supplied 3.0 prolene sutures via the proximal  limiter, with the insertion point affixed at the hub. A chest x-ray is pending. There were no immediate complications.  
 
Alison Lewis MD.

## 2020-11-12 LAB
ACC. NO. FROM MICRO ORDER, ACCP: ABNORMAL
BACTERIA SPEC CULT: NORMAL
INTERPRETATION: ABNORMAL
MECA (METHICILLIN-RESISTANCE GENES), MRGP: NOT DETECTED
SERVICE CMNT-IMP: NORMAL
STAPHYLOCOCCUS AUREUS: DETECTED
STAPHYLOCOCCUS, STAPP: DETECTED

## 2020-11-14 LAB
BACTERIA SPEC CULT: ABNORMAL
BACTERIA SPEC CULT: ABNORMAL
GRAM STN SPEC: ABNORMAL
SERVICE CMNT-IMP: ABNORMAL

## 2022-10-31 NOTE — H&P
Hospitalist Note Admit Date:  10/30/2020  5:42 PM  
Name:  Gunnar Hawkins Age:  44 y.o. 
:  1970 MRN:  [id number] PCP:  Nacho Gannon MD 
Treatment Team: Attending Provider: Kelly Johnson MD; Primary Nurse: Rossy Scott; Consulting Provider: Andre Montes MD 
 
HPI/Subjective:  
68-year-old female with past medical history significant for hypertension, rheumatoid arthritis, obstructive sleep apnea on CPAP and depression/anxiety presented to the ED for worsening of shortness of breath. Patient reports recent diagnosis of COVID-19 infection 8 days ago. Symptoms started as fatigue, body aches and shortness of breath. Patient reports worsening of exertional dyspnea for last 2 days and noted desaturations yesterday in 62s. She is using CPAP at home and tried it with improvement in saturation. Denies fever, chills, nausea, vomiting, abdominal pain or diarrhea. In the ED patient saturated in 80s on room air and was started on oxygen via nasal cannula requiring 6 to 8 L to maintain oxygen level above 92%. Labs shows WBC 6.1, hemoglobin 13.4, potassium 3, ferritin 348, CRP 11.7 and lactic acid 1.5. CXR shows diffuse bilateral small airspace consolidations characteristic of pneumonia from COVID-19. Hospitalist consulted for admission. 10 systems reviewed and negative except as noted in HPI. Past Medical History:  
Diagnosis Date  ADHD (attention deficit hyperactivity disorder)  Asthma MDI  Chronic fatigue  Chronic pain   
 generalized.  Cystitis   
 hx; fermin s/p catheter  Depression  Diabetes ([facility] [address]  Fibromyalgia  GERD (gastroesophageal reflux disease) 3/11/2015  H/O Clostridium difficile infection 2012  H/O seasonal allergies 2012  Heart palpitations   
 controlled with med  HLD (hyperlipidemia) 2012  
 HTN (hypertension) 2012  Hypokalemia 2012  Kidney stones   
 hx  Obesity, morbid (more than 100 lbs over ideal weight or BMI > 40) ([facility]) BMI 48, HTN, need large BP cuff.  GLENYS (obstructive sleep apnea) 7/21/2015  Osteoarthritis   
 spine, hip  Persistent disorder of initiating or maintaining sleep 7/21/2015  Postablative ovarian failure 5/28/2020  PTSD (post-traumatic stress disorder)  RA (rheumatoid arthritis) ([facility] [address]  Recurrent UTI 12/14/2012  Unspecified adverse effect of anesthesia   
 had transient blindness s/p anesthesia in 2000; surgeries since with no problem Past Surgical History:  
Procedure Laterality Date [address]  HX CHOLECYSTECTOMY  2012  HX GASTRECTOMY  05/27/2015 Gastric Sleeve  HX HEENT [place] teeth removed [id number]  HX OOPHORECTOMY    
 large mass had the ovary removed  HX PARTIAL HYSTERECTOMY  2002  
 w/ unilateral oophorectomy  HX PELVIC LAPAROSCOPY    
 HX TONSILLECTOMY  US GUIDED CORE BREAST BIOPSY Left Allergies Allergen Reactions  Codeine Palpitations  Flagyl [Metronidazole] Hives  Keflex [Cephalexin] Palpitations and Rash Other reaction(s): Rash-A 
CDiff  Levaquin [Levofloxacin] Hives Other reaction(s): Hallucinations-I  
 Lyrica [Pregabalin] Swelling  Macrobid [Nitrofurantoin Monohyd/M-Cryst] Hives Other reaction(s): Hives/Swelling-A, Rash-A  
 Neurontin [Gabapentin] Swelling  Other Medication Anaphylaxis RUTUSS allergy - dizziness  Penicillins Swelling Other reaction(s): Anaphylactic shock-A  Percocet [Oxycodone-Acetaminophen] Swelling Other reaction(s): Hives/Swelling-A, Rash-A  
 Robitussin A-C [Codeine-Guaifenesin] Rash  Sulfa (Sulfonamide Antibiotics) Rash Other reaction(s): Unknown  Tetanus Toxoid Unknown (comments) Other reaction(s): Unknown  Tetanus Vaccines And Toxoid Swelling  Tussin  [Dextromethorphan Hbr] Unknown (comments)  Tussin Dm Cough Medicine Unknown (comments)  Vancomycin Other (comments) Other reaction(s): Hives/Swelling-A \" Inability to walk\" & Edema  Whey Other (comments) \"blisters in my esophagus and stomach\"  Xyzal [Levocetirizine] Other (comments) Made her mean and irritable Social History Tobacco Use  Smoking status: Never Smoker  Smokeless tobacco: Never Used Substance Use Topics  Alcohol use: Yes Comment: Wine with meals 1-2 x per month. Family History Problem Relation Age of Onset  Diabetes Mother  Heart Disease Mother  Hypertension Mother  Diabetes Father  Heart Disease Father  Heart Failure Father  Hypertension Father  Kidney Disease Sister  Breast Cancer Maternal Grandmother 77  Hypertension Maternal Grandmother  Hypertension Paternal Grandmother Family history reviewed and noncontributory. Immunization History Administered Date(s) Administered  Influenza Nasal Vaccine (Quad)(2-49 Yrs Flumist QUAD [id number]) 10/29/2018  Influenza Vaccine 10/03/2013, 11/11/2015, 08/13/2019  Influenza Vaccine (Mdck Quadrivalent)(>4 Yrs Flucelvax Quad vial [id number]) 09/03/2019  Influenza Vaccine (Quad) Mdck Pf (>4 Yrs Flucelvax QUAD [id number]) 10/18/2018, 09/03/2019  Pneumococcal Conjugate (PCV-13) 10/18/2018  Pneumococcal Polysaccharide (PPSV-23) 09/03/2019  Pneumococcal Vaccine (Unspecified Type) 01/18/2014  Zoster Recombinant 06/06/2019, 08/13/2019 PTA Medications: 
Prior to Admission Medications Prescriptions Last Dose Informant Patient Reported? Taking? BD Tuberculin Syringe 1 mL 25 gauge x 5/8\" syrg   Yes No  
Cholecalciferol, Vitamin D3, 50,000 unit cap   Yes No  
Sig: Take  by mouth. HYDROcodone-acetaminophen (NORCO) 7.5-325 mg per tablet   No No  
Sig: Take 1 Tab by mouth every six (6) hours as needed for Pain. Max Daily Amount: 4 Tabs.   
Myrbetriq 50 mg ER tablet   No No  
 Sig: Take 1 Tab by mouth daily. albuterol (Ventolin HFA) 90 mcg/actuation inhaler   No No  
Sig: Take 2 Puffs by inhalation every six (6) hours as needed for Wheezing. atorvastatin (LIPITOR) 40 mg tablet   Yes No  
certolizumab pegoL (Cimzia Starter Kit) 400 mg/2 mL (200 mg/mL x 2) sykt injection   Yes No  
Si mg by SubCUTAneous route. cpap machine kit   Yes No  
Sig: by Does Not Apply route. 10 cm  
cyanocobalamin (VITAMIN B12) 1,000 mcg/mL injection   No No  
Si mL by IntraMUSCular route every seven (7) days. diclofenac (Voltaren) 1 % gel   Yes No  
Sig: apply 2-4 grams to affected area  
diphenoxylate-atropine (LomotiL) 2.5-0.025 mg per tablet   No No  
Sig: Take 1 Tab by mouth four (4) times daily as needed for Diarrhea. Max Daily Amount: 4 Tabs.  
ergocalciferol (Drisdol) 1,250 mcg (50,000 unit) capsule   Yes No  
Si capsule  
estradiol (ESTRACE) 1 mg tablet   Yes No  
fluticasone furoate-vilanteroL (Breo Ellipta) 200-25 mcg/dose inhaler   No No  
Sig: Take 1 Puff by inhalation daily. 1 inhalation each day instead of advair or symbicort) rinse gargle and swallow after using  
fluticasone propionate (Flonase) 50 mcg/actuation nasal spray   No No  
Si Sprays by Both Nostrils route daily. folic acid (FOLVITE) 1 mg tablet   Yes No  
furosemide (Lasix) 40 mg tablet   No No  
Si tablet  
hydroCHLOROthiazide (HYDRODIURIL) 25 mg tablet   Yes No  
Sig: Take 25 mg by mouth. hyoscyamine sulfate (NuLev) 0.125 mg tablet   No No  
Si-2 po QID prn abdominal cramping and pain  
methen-sod phos-meth blue-hyos (UROGESIC-BLUE) 81.6-40.8-0.12 mg tab   No No  
Sig: Take 1 Cap by mouth every six (6) hours as needed for Pain. methotrexate, PF, 25 mg/mL injection   Yes No  
Si.8ml  
mometasone (ELOCON) 0.1 % topical cream   Yes No  
Sig: Apply  to affected area. nabumetone (RELAFEN) 750 mg tablet   Yes No  
Sig: two (2) times a day.   
omeprazole (PRILOSEC) 20 mg capsule   No No  
 Si po bid Before eating for stomach  
phentermine (ADIPEX-P) 37.5 mg tablet   No No  
Sig: Take 1 Tab by mouth every morning. Max Daily Amount: 37.5 mg.  
potassium chloride (K-DUR, KLOR-CON) 20 mEq tablet   No No  
Si po qd for potassium  
potassium chloride (KLOR-CON) 10 mEq tablet   No No  
Sig: Take 1 Tab by mouth daily. promethazine (PHENERGAN) 25 mg tablet   No No  
Sig: TAKE ONE TABLET BY MOUTH EVERY 6 HOURS AS NEEDED FOR NAUSEA  
propranolol LA (INDERAL LA) 120 mg SR capsule   Yes No  
Sig: nightly. Indications: palpitations  
venlafaxine-SR (EFFEXOR-XR) 150 mg capsule   Yes No  
zolpidem (Ambien) 10 mg tablet   No No  
Sig: Take 1 Tab by mouth nightly as needed for Sleep. Max Daily Amount: 10 mg. Facility-Administered Medications: None Objective:  
 
Patient Vitals for the past 24 hrs: 
 Temp Pulse Resp BP SpO2  
10/30/20 2130 97.6 °F (36.4 °C) 77 24 109/62 90 % 10/30/20 2112     95 % 10/30/20 2109  82   93 % 10/30/20 2030  77   94 % 10/30/20 1938  79  (!) 100/57 97 % 10/30/20 1904  76   95 % 10/30/20 1859  73  113/65 91 % 10/30/20 1813     93 % 10/30/20 1726     92 % 10/30/20 1724     (!) 85 % 10/30/20 1719 99.4 °F (37.4 °C) 83 24 108/71 (!) 81 % Oxygen Therapy O2 Sat (%): 90 % (10/30/20 2130) Pulse via Oximetry: 82 beats per minute (10/30/20 2109) O2 Device: Hi flow nasal cannula (10/30/20 2130) O2 Flow Rate (L/min): 9 l/min (10/30/20 2130) Estimated body mass index is 43.71 kg/m² as calculated from the following: 
  Height as of this encounter: 5' 2\" (1.575 m). Weight as of this encounter: 108.4 kg (239 lb). Intake/Output Summary (Last 24 hours) at 10/30/2020 2338 Last data filed at 10/30/2020 2118 Gross per 24 hour Intake 1100 ml Output  Net 1100 ml *Note that automatically entered I/Os may not be accurate; dependent on patient compliance with collection and accurate  by assistants. Physical Exam: 
General:    Well nourished. Alert. Eyes:   Normal sclerae. Extraocular movements intact. HENT:  Normocephalic, atraumatic. Moist mucous membranes CV:   RRR. No m/r/g. Lungs:  Bilateral rhonchi scattered Abdomen: Soft, nontender, nondistended. Extremities: Warm and dry. No cyanosis or edema. Neurologic: CN II-XII grossly intact. Sensation intact. Skin:     No rashes or jaundice. Normal coloration Psych:  Normal mood and affect. I reviewed the labs, imaging, EKGs, telemetry, and other studies done this admission. Data Reviewed:  
Recent Results (from the past 24 hour(s)) CBC WITH AUTOMATED DIFF Collection Time: 10/30/20  5:25 PM  
Result Value Ref Range WBC 6.1 4.3 - 11.1 K/uL  
 RBC 4.37 4.05 - 5.2 M/uL  
 HGB 13.4 11.7 - 15.4 g/dL HCT 39.4 35.8 - 46.3 % MCV 90.2 79.6 - 97.8 FL  
 MCH 30.7 26.1 - 32.9 PG  
 MCHC 34.0 31.4 - 35.0 g/dL  
 RDW 13.9 11.9 - 14.6 % PLATELET 700 238 - 269 K/uL MPV 9.4 9.4 - 12.3 FL ABSOLUTE NRBC 0.00 0.0 - 0.2 K/uL  
 DF AUTOMATED NEUTROPHILS 73 43 - 78 % LYMPHOCYTES 16 13 - 44 % MONOCYTES 10 4.0 - 12.0 % EOSINOPHILS 0 (L) 0.5 - 7.8 % BASOPHILS 0 0.0 - 2.0 % IMMATURE GRANULOCYTES 0 0.0 - 5.0 %  
 ABS. NEUTROPHILS 4.4 1.7 - 8.2 K/UL  
 ABS. LYMPHOCYTES 1.0 0.5 - 4.6 K/UL  
 ABS. MONOCYTES 0.6 0.1 - 1.3 K/UL  
 ABS. EOSINOPHILS 0.0 0.0 - 0.8 K/UL  
 ABS. BASOPHILS 0.0 0.0 - 0.2 K/UL  
 ABS. IMM. GRANS. 0.0 0.0 - 0.5 K/UL METABOLIC PANEL, COMPREHENSIVE Collection Time: 10/30/20  5:25 PM  
Result Value Ref Range Sodium 135 (L) 136 - 145 mmol/L Potassium 3.0 (L) 3.5 - 5.1 mmol/L Chloride 98 98 - 107 mmol/L  
 CO2 32 21 - 32 mmol/L Anion gap 5 (L) 7 - 16 mmol/L Glucose 104 (H) 65 - 100 mg/dL BUN 8 6 - 23 MG/DL Creatinine 0.70 0.6 - 1.0 MG/DL  
 GFR est AA >60 >60 ml/min/1.73m2 GFR est non-AA >60 >60 ml/min/1.73m2  Calcium 8.0 (L) 8.3 - 10.4 MG/DL  
 Bilirubin, total 0.7 0.2 - 1.1 MG/DL  
 ALT (SGPT) 25 12 - 65 U/L  
 AST (SGOT) 52 (H) 15 - 37 U/L Alk. phosphatase 113 50 - 136 U/L Protein, total 7.0 6.3 - 8.2 g/dL Albumin 2.7 (L) 3.5 - 5.0 g/dL Globulin 4.3 (H) 2.3 - 3.5 g/dL A-G Ratio 0.6 (L) 1.2 - 3.5 LACTIC ACID Collection Time: 10/30/20  5:25 PM  
Result Value Ref Range Lactic acid 1.5 0.4 - 2.0 MMOL/L  
C REACTIVE PROTEIN, QT Collection Time: 10/30/20  5:25 PM  
Result Value Ref Range C-Reactive protein 11.7 (H) 0.0 - 0.9 mg/dL FERRITIN Collection Time: 10/30/20  5:25 PM  
Result Value Ref Range Ferritin 348 8 - 388 NG/ML  
POC G3 Collection Time: 10/30/20  9:36 PM  
Result Value Ref Range Device: High Flow Nasal Cannula    
 pH (POC) 7.52 (H) 7.35 - 7.45    
 pCO2 (POC) 31.9 (L) 35 - 45 MMHG  
 pO2 (POC) 69 (L) 75 - 100 MMHG  
 HCO3 (POC) 25.9 22 - 26 MMOL/L  
 sO2 (POC) 96 95 - 98 % Base excess (POC) 3 mmol/L Allens test (POC) NOT APPLICABLE Site LEFT BRACHIAL Specimen type (POC) ARTERIAL Performed by Jg   
 CO2, POC 27 MMOL/L Flow rate (POC) 8.000 L/min Respiratory comment: NurseNotified COLLECT TIME 2,130 All Micro Results Procedure Component Value Units Date/Time CULTURE, BLOOD [[id number]] Collected:  10/30/20 1809 Order Status:  Completed Specimen:  Blood Updated:  10/30/20 1846 CULTURE, BLOOD [[id number]] Collected:  10/30/20 1725 Order Status:  Completed Specimen:  Blood Updated:  10/30/20 1731 Current Facility-Administered Medications Medication Dose Route Frequency  sodium chloride (NS) flush 5-40 mL  5-40 mL IntraVENous Q8H  
 sodium chloride (NS) flush 5-40 mL  5-40 mL IntraVENous PRN  
 acetaminophen (TYLENOL) tablet 650 mg  650 mg Oral Q6H PRN Or  
 acetaminophen (TYLENOL) suppository 650 mg  650 mg Rectal Q6H PRN  polyethylene glycol (MIRALAX) packet 17 g  17 g Oral DAILY PRN  
  promethazine (PHENERGAN) tablet 12.5 mg  12.5 mg Oral Q6H PRN Or  
 ondansetron (ZOFRAN) injection 4 mg  4 mg IntraVENous Q6H PRN  
 [START ON 10/31/2020] enoxaparin (LOVENOX) injection 40 mg  40 mg SubCUTAneous Q12H  
 0.9% sodium chloride infusion 250 mL  250 mL IntraVENous PRN  
 HYDROcodone-acetaminophen (NORCO) 7.5-325 mg per tablet 1 Tab  1 Tab Oral Q6H PRN Other Studies: No results found for this visit on 10/30/20. Xr Chest [facility] Result Date: 10/30/2020 EXAMINATION: CHEST RADIOGRAPH 10/30/2020 6:36 PM INDICATION: Shortness of breath and hypoxia. COVID 19 positive COMPARISON: None TECHNIQUE: A single view of the chest was obtained. FINDINGS: Support Devices: None Osseous : No acute abnormality. Cardiomediastinal Silhouette: Normal in size. Lungs: Diffuse bilateral small airspace consolidations. Pleura: No pleural fluid or pneumothorax. Upper Abdomen: No abnormality. IMPRESSION: Diffuse bilateral small airspace consolidations characteristic of pneumonia from COVID 19 SARS-CoV-2 Lab Results \"Novel Coronavirus\" Test: No results found for: COV2NT \"Emergent Disease\" Test: No results found for: EDPR \"SARS-COV-2\" Test: No results found for: XGCOVT Rapid Test: No results found for: COVR Assessment and Plan:  
 
Hospital Problems as of 10/30/2020 Date Reviewed: 10/21/2020 Codes Class Noted - Resolved POA * (Principal) [facility] ICD-10-CM: J96.01 
ICD-9-CM: 518.81  10/30/2020 - Present Unknown COVID-19 ICD-10-CM: U07.1 ICD-9-CM: 079.89  10/30/2020 - Present Unknown GLENYS (obstructive sleep apnea) ICD-10-CM: K32.04 
ICD-9-CM: 327.23  7/21/2015 - Present Yes HTN (hypertension) ICD-10-CM: I10 
ICD-9-CM: 401.9  12/14/2012 - Present Yes Overview Signed 12/17/2012  9:09 AM by Sandeep Chapman MD  
  The patient is stable on the current medications. Tolerating well. No sides effects. Will not adjust at this time. HLD (hyperlipidemia) ICD-10-CM: E78.5 ICD-9-CM: 272.4  12/14/2012 - Present Yes Overview Signed 12/17/2012  9:09 AM by Nacho Gannon MD  
  The patient is stable on the current medications. Tolerating well. No sides effects. Will not adjust at this time. Fibromyalgia ICD-10-CM: M79.7 ICD-9-CM: 729.1  12/14/2012 - Present Yes Overview Signed 12/17/2012  9:10 AM by Nacho Gannon MD  
  The patient is stable on the current medications. Tolerating well. No sides effects. Will not adjust at this time. RA (rheumatoid arthritis) (HCC) ICD-10-CM: M06.9 ICD-9-CM: 714.0  12/14/2012 - Present Yes Overview Signed 12/17/2012  9:11 AM by Nacho Gannon MD  
  The patient is stable on the current medications. Tolerating well. No sides effects. Will not adjust at this time. Followed by Dr. Emilio Duverney. Plan: 
Admit patient to the medical floor/Covid unit due to acute hypoxemic respiratory failure secondary to COVID-19 pneumonia Patient oxygen demand increased, requiring 9L high flow nasal cannula to maintain oxygen saturation above 92% ABG ordered Plasma convalescent transfusion Decadron 6 mg p.o. x 10 days Consult ID for remdesivir Supportive care Nebs treatment Incentive spirometry HTN: Continue home meds hydrochlorothiazide Depression/anxiety: Continue venlafaxine Hyperlipidemia: Continue atorvastatin Rheumatoid arthritis: Patient is on Cimzia, last dose 2 weeks ago Discharge planning: Admit to the medical floor/Covid unit, low threshold for ICU 
 
DVT ppx ordered Code status:  Full Estimated LOS:  Greater than 2 midnights Risk:  high Signed: 
Joaquín Sands MD 
 
 VITALS:   T(C): 36.7 (10-31-22 @ 14:32), Max: 36.8 (10-31-22 @ 09:54)  HR: 76 (10-31-22 @ 14:32) (68 - 76)  BP: 159/76 (10-31-22 @ 14:32) (159/76 - 204/84)  RR: 17 (10-31-22 @ 14:32) (16 - 17)  SpO2: 94% (10-31-22 @ 14:32) (94% - 98%)    PHYSICAL EXAM:  General: Lying in bed comfortably. No acute distress. Pleasant.  Head: Atraumatic, normocephalic.  Eyes: EOMI, PERRLA. Conjunctiva and sclera clear.  ENT: Moist mucous membranes. No exudates.   Neck: Supple. No obvious JVD.  Heart: Normal S1, S2. Regular rate and rhythm. No murmurs, rubs, or gallops.  Lungs: Clear to auscultation bilaterally. Symmetric breath sounds. No crackles, wheezing, or rhonchi.  Abdomen: Soft, nontender, nondistended. Normoactive bowel sounds present. No palpable masses.  Extremities: 2+ Peripheral pulses bilaterally. No calf tenderness. No clubbing, cyanosis. +B/L LE swelling  Nervous System: Answers questions appropriately. Follow commands. Able to move all 4 extremities.  Skin: Warm skin, appears well perfused.